# Patient Record
Sex: MALE | Race: WHITE | NOT HISPANIC OR LATINO | Employment: FULL TIME | URBAN - NONMETROPOLITAN AREA
[De-identification: names, ages, dates, MRNs, and addresses within clinical notes are randomized per-mention and may not be internally consistent; named-entity substitution may affect disease eponyms.]

---

## 2018-06-17 ENCOUNTER — HOSPITAL ENCOUNTER (EMERGENCY)
Facility: HOSPITAL | Age: 38
Discharge: HOME | End: 2018-06-18
Attending: EMERGENCY MEDICINE | Admitting: EMERGENCY MEDICINE

## 2018-06-17 ENCOUNTER — APPOINTMENT (OUTPATIENT)
Dept: GENERAL RADIOLOGY | Facility: HOSPITAL | Age: 38
End: 2018-06-17

## 2018-06-17 DIAGNOSIS — R07.9 CHEST PAIN, UNSPECIFIED TYPE: Primary | ICD-10-CM

## 2018-06-17 LAB
ALBUMIN SERPL-MCNC: 4.1 G/DL (ref 3.5–5)
ALBUMIN/GLOB SERPL: 1.2 G/DL (ref 1.1–2.5)
ALP SERPL-CCNC: 104 U/L (ref 24–120)
ALT SERPL W P-5'-P-CCNC: 92 U/L (ref 0–54)
ANION GAP SERPL CALCULATED.3IONS-SCNC: 11 MMOL/L (ref 4–13)
APTT PPP: 25.8 SECONDS (ref 24.1–34.8)
AST SERPL-CCNC: 51 U/L (ref 7–45)
BASOPHILS # BLD AUTO: 0.04 10*3/MM3 (ref 0–0.2)
BASOPHILS NFR BLD AUTO: 0.4 % (ref 0–2)
BILIRUB SERPL-MCNC: 0.3 MG/DL (ref 0.1–1)
BUN BLD-MCNC: 20 MG/DL (ref 5–21)
BUN/CREAT SERPL: 20.6 (ref 7–25)
CALCIUM SPEC-SCNC: 8.6 MG/DL (ref 8.4–10.4)
CHLORIDE SERPL-SCNC: 104 MMOL/L (ref 98–110)
CO2 SERPL-SCNC: 27 MMOL/L (ref 24–31)
CREAT BLD-MCNC: 0.97 MG/DL (ref 0.5–1.4)
D DIMER PPP FEU-MCNC: 0.33 MG/L (FEU) (ref 0–0.5)
DEPRECATED RDW RBC AUTO: 37.6 FL (ref 40–54)
EOSINOPHIL # BLD AUTO: 0.33 10*3/MM3 (ref 0–0.7)
EOSINOPHIL NFR BLD AUTO: 3.5 % (ref 0–4)
ERYTHROCYTE [DISTWIDTH] IN BLOOD BY AUTOMATED COUNT: 12.7 % (ref 12–15)
GFR SERPL CREATININE-BSD FRML MDRD: 87 ML/MIN/1.73
GLOBULIN UR ELPH-MCNC: 3.3 GM/DL
GLUCOSE BLD-MCNC: 109 MG/DL (ref 70–100)
HCT VFR BLD AUTO: 42.8 % (ref 40–52)
HGB BLD-MCNC: 14.7 G/DL (ref 14–18)
IMM GRANULOCYTES # BLD: 0.05 10*3/MM3 (ref 0–0.03)
IMM GRANULOCYTES NFR BLD: 0.5 % (ref 0–5)
INR PPP: 0.97 (ref 0.91–1.09)
LYMPHOCYTES # BLD AUTO: 3.45 10*3/MM3 (ref 0.72–4.86)
LYMPHOCYTES NFR BLD AUTO: 36.8 % (ref 15–45)
MCH RBC QN AUTO: 27.9 PG (ref 28–32)
MCHC RBC AUTO-ENTMCNC: 34.3 G/DL (ref 33–36)
MCV RBC AUTO: 81.4 FL (ref 82–95)
MONOCYTES # BLD AUTO: 0.78 10*3/MM3 (ref 0.19–1.3)
MONOCYTES NFR BLD AUTO: 8.3 % (ref 4–12)
NEUTROPHILS # BLD AUTO: 4.72 10*3/MM3 (ref 1.87–8.4)
NEUTROPHILS NFR BLD AUTO: 50.5 % (ref 39–78)
NRBC BLD MANUAL-RTO: 0 /100 WBC (ref 0–0)
NT-PROBNP SERPL-MCNC: <12 PG/ML (ref 0–450)
PLATELET # BLD AUTO: 237 10*3/MM3 (ref 130–400)
PMV BLD AUTO: 10.5 FL (ref 6–12)
POTASSIUM BLD-SCNC: 3.6 MMOL/L (ref 3.5–5.3)
PROT SERPL-MCNC: 7.4 G/DL (ref 6.3–8.7)
PROTHROMBIN TIME: 13.2 SECONDS (ref 11.9–14.6)
RBC # BLD AUTO: 5.26 10*6/MM3 (ref 4.8–5.9)
SODIUM BLD-SCNC: 142 MMOL/L (ref 135–145)
TROPONIN I SERPL-MCNC: <0.012 NG/ML (ref 0–0.03)
WBC NRBC COR # BLD: 9.37 10*3/MM3 (ref 4.8–10.8)

## 2018-06-17 PROCEDURE — 71045 X-RAY EXAM CHEST 1 VIEW: CPT

## 2018-06-17 PROCEDURE — 80053 COMPREHEN METABOLIC PANEL: CPT

## 2018-06-17 PROCEDURE — 85379 FIBRIN DEGRADATION QUANT: CPT | Performed by: NURSE PRACTITIONER

## 2018-06-17 PROCEDURE — 93005 ELECTROCARDIOGRAM TRACING: CPT

## 2018-06-17 PROCEDURE — 85730 THROMBOPLASTIN TIME PARTIAL: CPT | Performed by: NURSE PRACTITIONER

## 2018-06-17 PROCEDURE — 93005 ELECTROCARDIOGRAM TRACING: CPT | Performed by: EMERGENCY MEDICINE

## 2018-06-17 PROCEDURE — 99285 EMERGENCY DEPT VISIT HI MDM: CPT

## 2018-06-17 PROCEDURE — 85610 PROTHROMBIN TIME: CPT | Performed by: NURSE PRACTITIONER

## 2018-06-17 PROCEDURE — 85025 COMPLETE CBC W/AUTO DIFF WBC: CPT

## 2018-06-17 PROCEDURE — 93010 ELECTROCARDIOGRAM REPORT: CPT | Performed by: INTERNAL MEDICINE

## 2018-06-17 PROCEDURE — 83880 ASSAY OF NATRIURETIC PEPTIDE: CPT | Performed by: NURSE PRACTITIONER

## 2018-06-17 PROCEDURE — 84484 ASSAY OF TROPONIN QUANT: CPT

## 2018-06-17 RX ORDER — SODIUM CHLORIDE 0.9 % (FLUSH) 0.9 %
10 SYRINGE (ML) INJECTION AS NEEDED
Status: DISCONTINUED | OUTPATIENT
Start: 2018-06-17 | End: 2018-06-18 | Stop reason: HOSPADM

## 2018-06-17 RX ORDER — ASPIRIN 81 MG/1
324 TABLET, CHEWABLE ORAL ONCE
Status: DISCONTINUED | OUTPATIENT
Start: 2018-06-17 | End: 2018-06-17

## 2018-06-17 RX ORDER — ASPIRIN 81 MG/1
81 TABLET, CHEWABLE ORAL ONCE
Status: COMPLETED | OUTPATIENT
Start: 2018-06-17 | End: 2018-06-17

## 2018-06-17 RX ADMIN — ASPIRIN 81 MG 81 MG: 81 TABLET ORAL at 23:20

## 2018-06-18 ENCOUNTER — APPOINTMENT (OUTPATIENT)
Dept: CARDIOLOGY | Facility: HOSPITAL | Age: 38
End: 2018-06-18
Attending: EMERGENCY MEDICINE

## 2018-06-18 VITALS
HEART RATE: 65 BPM | RESPIRATION RATE: 15 BRPM | WEIGHT: 315 LBS | HEIGHT: 71 IN | BODY MASS INDEX: 44.1 KG/M2 | TEMPERATURE: 98.5 F | SYSTOLIC BLOOD PRESSURE: 152 MMHG | OXYGEN SATURATION: 100 % | DIASTOLIC BLOOD PRESSURE: 93 MMHG

## 2018-06-18 PROBLEM — R07.9 CHEST PAIN: Status: ACTIVE | Noted: 2018-06-18

## 2018-06-18 PROBLEM — Z00.00 WELLNESS EXAMINATION: Status: ACTIVE | Noted: 2018-06-18

## 2018-06-18 PROBLEM — K76.0 FATTY LIVER: Status: ACTIVE | Noted: 2018-06-18

## 2018-06-18 PROBLEM — G47.00 INSOMNIA: Status: ACTIVE | Noted: 2018-06-18

## 2018-06-18 PROBLEM — E66.9 OBESITY: Status: ACTIVE | Noted: 2018-06-18

## 2018-06-18 PROBLEM — G47.33 OBSTRUCTIVE SLEEP APNEA: Status: ACTIVE | Noted: 2018-06-18

## 2018-06-18 LAB
ALBUMIN SERPL-MCNC: 3.7 G/DL (ref 3.5–5)
ALBUMIN/GLOB SERPL: 1.1 G/DL (ref 1.1–2.5)
ALP SERPL-CCNC: 91 U/L (ref 24–120)
ALT SERPL W P-5'-P-CCNC: 93 U/L (ref 0–54)
ANION GAP SERPL CALCULATED.3IONS-SCNC: 10 MMOL/L (ref 4–13)
AST SERPL-CCNC: 56 U/L (ref 7–45)
BH CV STRESS BP STAGE 1: NORMAL
BH CV STRESS BP STAGE 2: NORMAL
BH CV STRESS BP STAGE 3: NORMAL
BH CV STRESS DURATION MIN STAGE 1: 3
BH CV STRESS DURATION MIN STAGE 2: 3
BH CV STRESS DURATION MIN STAGE 3: 0
BH CV STRESS DURATION SEC STAGE 1: 0
BH CV STRESS DURATION SEC STAGE 2: 0
BH CV STRESS DURATION SEC STAGE 3: 30
BH CV STRESS ECHO POST STRESS EJECTION FRACTION EF: 65 %
BH CV STRESS GRADE STAGE 1: 10
BH CV STRESS GRADE STAGE 2: 12
BH CV STRESS GRADE STAGE 3: 14
BH CV STRESS HR STAGE 1: 97
BH CV STRESS HR STAGE 2: 145
BH CV STRESS HR STAGE 3: 169
BH CV STRESS METS STAGE 1: 5
BH CV STRESS METS STAGE 2: 7.5
BH CV STRESS METS STAGE 3: 10
BH CV STRESS PROTOCOL 1: NORMAL
BH CV STRESS RECOVERY BP: NORMAL MMHG
BH CV STRESS RECOVERY HR: 100 BPM
BH CV STRESS SPEED STAGE 1: 1.7
BH CV STRESS SPEED STAGE 2: 2.5
BH CV STRESS SPEED STAGE 3: 3.4
BH CV STRESS STAGE 1: 1
BH CV STRESS STAGE 2: 2
BH CV STRESS STAGE 3: 3
BILIRUB SERPL-MCNC: 0.3 MG/DL (ref 0.1–1)
BUN BLD-MCNC: 19 MG/DL (ref 5–21)
BUN/CREAT SERPL: 21.6 (ref 7–25)
CALCIUM SPEC-SCNC: 8.7 MG/DL (ref 8.4–10.4)
CHLORIDE SERPL-SCNC: 105 MMOL/L (ref 98–110)
CO2 SERPL-SCNC: 27 MMOL/L (ref 24–31)
CREAT BLD-MCNC: 0.88 MG/DL (ref 0.5–1.4)
GFR SERPL CREATININE-BSD FRML MDRD: 97 ML/MIN/1.73
GLOBULIN UR ELPH-MCNC: 3.4 GM/DL
GLUCOSE BLD-MCNC: 106 MG/DL (ref 70–100)
HOLD SPECIMEN: NORMAL
HOLD SPECIMEN: NORMAL
LV EF 2D ECHO EST: 55 %
PERCENT MAX PREDICTED HR: 92.35 %
POTASSIUM BLD-SCNC: 4.3 MMOL/L (ref 3.5–5.3)
PROT SERPL-MCNC: 7.1 G/DL (ref 6.3–8.7)
SODIUM BLD-SCNC: 142 MMOL/L (ref 135–145)
STRESS BASELINE BP: NORMAL MMHG
STRESS BASELINE HR: 76 BPM
STRESS PERCENT HR: 109 %
STRESS POST ESTIMATED WORKLOAD: 10 METS
STRESS POST EXERCISE DUR MIN: 6 MIN
STRESS POST EXERCISE DUR SEC: 30 SEC
STRESS POST PEAK BP: NORMAL MMHG
STRESS POST PEAK HR: 169 BPM
TROPONIN I SERPL-MCNC: <0.012 NG/ML (ref 0–0.03)
TROPONIN I SERPL-MCNC: <0.012 NG/ML (ref 0–0.03)
WHOLE BLOOD HOLD SPECIMEN: NORMAL
WHOLE BLOOD HOLD SPECIMEN: NORMAL

## 2018-06-18 PROCEDURE — 93010 ELECTROCARDIOGRAM REPORT: CPT | Performed by: INTERNAL MEDICINE

## 2018-06-18 PROCEDURE — 93350 STRESS TTE ONLY: CPT

## 2018-06-18 PROCEDURE — 80053 COMPREHEN METABOLIC PANEL: CPT | Performed by: NURSE PRACTITIONER

## 2018-06-18 PROCEDURE — 93350 STRESS TTE ONLY: CPT | Performed by: INTERNAL MEDICINE

## 2018-06-18 PROCEDURE — 93352 ADMIN ECG CONTRAST AGENT: CPT | Performed by: INTERNAL MEDICINE

## 2018-06-18 PROCEDURE — 93017 CV STRESS TEST TRACING ONLY: CPT

## 2018-06-18 PROCEDURE — 84484 ASSAY OF TROPONIN QUANT: CPT | Performed by: EMERGENCY MEDICINE

## 2018-06-18 PROCEDURE — 25010000002 PERFLUTREN 6.52 MG/ML SUSPENSION: Performed by: INTERNAL MEDICINE

## 2018-06-18 PROCEDURE — 84484 ASSAY OF TROPONIN QUANT: CPT | Performed by: NURSE PRACTITIONER

## 2018-06-18 PROCEDURE — 93018 CV STRESS TEST I&R ONLY: CPT | Performed by: INTERNAL MEDICINE

## 2018-06-18 PROCEDURE — 93005 ELECTROCARDIOGRAM TRACING: CPT | Performed by: EMERGENCY MEDICINE

## 2018-06-18 RX ADMIN — PERFLUTREN 8.48 MG: 6.52 INJECTION, SUSPENSION INTRAVENOUS at 07:59

## 2018-06-18 NOTE — ED NOTES
Pt resting NSR on the monitor states decrease in pain.  Waiting for stress test in AM.  Has his own c-pap on.  Verbalizes no needs at present.     Elisa Storey RN  06/18/18 2972

## 2018-06-18 NOTE — ED PROVIDER NOTES
Subjective   Patient is a 38 yo male presents to the ER with complaints of intermittent chest pain. He states that he has had discomfort for past few days however today began experiencing worse pain. He reports left sided chest pain associated with mild shortness of breath and palpitations. He was concerned that he had a PE because he admittedly does little physical activity and is a  that requires long periods of time at rest. He denies any nausea, vomiting, dizziness, light headedness, diaphoresis, or other associated sxs or modifying factors. Due to sxs he elected to come to the ER for further evaluation and treatment.         Chest Pain   Pain location:  L chest  Pain quality: pressure    Pain severity:  Moderate  Timing:  Intermittent  Chronicity:  New  Context: at rest    Worsened by:  Nothing  Ineffective treatments:  Aspirin  Associated symptoms: palpitations and shortness of breath    Associated symptoms: no abdominal pain, no back pain, no cough, no diaphoresis, no dizziness, no fatigue, no fever, no headache, no heartburn, no nausea, no near-syncope, no numbness, no vomiting and no weakness    Risk factors: male sex and obesity    Risk factors: no prior DVT/PE and no smoking        Review of Systems   Constitutional: Negative for activity change, appetite change, chills, diaphoresis, fatigue and fever.   HENT: Negative for congestion, ear pain, nosebleeds, postnasal drip and sinus pressure.    Eyes: Negative for photophobia and pain.   Respiratory: Positive for shortness of breath. Negative for cough, chest tightness and wheezing.    Cardiovascular: Positive for chest pain and palpitations. Negative for near-syncope.   Gastrointestinal: Negative for abdominal pain, blood in stool, diarrhea, heartburn, nausea and vomiting.   Endocrine: Negative for cold intolerance and heat intolerance.   Genitourinary: Negative for difficulty urinating, dysuria and flank pain.   Musculoskeletal: Negative for  arthralgias, back pain, neck pain and neck stiffness.   Skin: Negative for color change and rash.   Neurological: Negative for dizziness, weakness, numbness and headaches.   Hematological: Negative for adenopathy. Does not bruise/bleed easily.   Psychiatric/Behavioral: Negative for confusion and sleep disturbance. The patient is not nervous/anxious.        Past Medical History:   Diagnosis Date   • Pulmonary hypertension     patient states he haad a tralee event while giving blood and went into pulmonary htn       No Known Allergies    Past Surgical History:   Procedure Laterality Date   • CHOLECYSTECTOMY     • SINUS SURGERY         History reviewed. No pertinent family history.    Social History     Social History   • Marital status: Single     Social History Main Topics   • Smoking status: Never Smoker   • Smokeless tobacco: Never Used   • Alcohol use No   • Drug use: No     Other Topics Concern   • Not on file           Objective   Physical Exam   Constitutional: He is oriented to person, place, and time. He appears well-developed and well-nourished. No distress.   HENT:   Head: Normocephalic and atraumatic.   Right Ear: External ear normal.   Left Ear: External ear normal.   Nose: Nose normal.   Eyes: Conjunctivae are normal. Pupils are equal, round, and reactive to light. No scleral icterus.   Neck: Normal range of motion. Neck supple. No JVD present. No thyromegaly present.   Cardiovascular: Normal rate, regular rhythm, normal heart sounds and intact distal pulses.    No murmur heard.  Pulmonary/Chest: Effort normal and breath sounds normal. No respiratory distress. He has no wheezes. He has no rales. He exhibits no tenderness.   Abdominal: Soft. Bowel sounds are normal. He exhibits no distension and no mass. There is no tenderness. There is no rebound and no guarding.   Musculoskeletal: Normal range of motion. He exhibits no edema.   Lymphadenopathy:     He has no cervical adenopathy.   Neurological: He is  alert and oriented to person, place, and time. He has normal reflexes. No cranial nerve deficit. Coordination normal.   Skin: Skin is warm and dry. Capillary refill takes less than 2 seconds. No rash noted. He is not diaphoretic. No erythema. No pallor.   Psychiatric: He has a normal mood and affect. His behavior is normal. Judgment and thought content normal.   Nursing note and vitals reviewed.      ECG 12 Lead    Date/Time: 6/17/2018 10:57 PM  Performed by: YOUSUF MONK  Authorized by: YOUSUF MONK   Interpreted by physician  Rhythm: sinus rhythm  Rate: normal  BPM: 80  QRS axis: normal      ECG 12 Lead    Date/Time: 6/18/2018 2:11 AM  Performed by: YOUSUF MONK  Authorized by: YOUSUF MONK   Interpreted by physician  Rhythm: sinus rhythm  Rate: normal  BPM: 65  QRS axis: normal               XR Chest 1 View   Final Result   1. No acute cardiopulmonary process.   This report was finalized on 06/18/2018 07:28 by Dr. Minerva Verduzco MD.        Labs Reviewed   COMPREHENSIVE METABOLIC PANEL - Abnormal; Notable for the following:        Result Value    Glucose 109 (*)     ALT (SGPT) 92 (*)     AST (SGOT) 51 (*)     All other components within normal limits   CBC WITH AUTO DIFFERENTIAL - Abnormal; Notable for the following:     MCV 81.4 (*)     MCH 27.9 (*)     RDW-SD 37.6 (*)     Immature Grans, Absolute 0.05 (*)     All other components within normal limits   COMPREHENSIVE METABOLIC PANEL - Abnormal; Notable for the following:     Glucose 106 (*)     ALT (SGPT) 93 (*)     AST (SGOT) 56 (*)     All other components within normal limits   TROPONIN (IN-HOUSE) - Normal   PROTIME-INR - Normal   APTT - Normal   D-DIMER, QUANTITATIVE - Normal    Narrative:     Reference Range is 0-0.50 mg/L FEU. However, results <0.50 mg/L FEU tends to rule out DVT or PE. Results >0.50 mg/L FEU are not useful in predicting absence or presence of DVT or PE.   TROPONIN (IN-HOUSE) - Normal   BNP (IN-HOUSE) -  Normal   TROPONIN (IN-HOUSE) - Normal   RAINBOW DRAW    Narrative:     The following orders were created for panel order [place] Draw.  Procedure                               Abnormality         Status                     ---------                               -----------         ------                     Light Blue Top[[id number]]                                   Final result               Green Top (Gel)[[id number]]                                  Final result               Lavender Top[[id number]]                                     Final result               Red Top[[id number]]                                          Final result                 Please view results for these tests on the individual orders.   TROPONIN (IN-HOUSE)   CBC WITH AUTO DIFFERENTIAL   CBC AND DIFFERENTIAL    Narrative:     The following orders were created for panel order CBC & Differential.  Procedure                               Abnormality         Status                     ---------                               -----------         ------                     CBC Auto Differential[[id number]]        Abnormal            Final result                 Please view results for these tests on the individual orders.   LIGHT BLUE TOP   GREEN TOP   LAVENDER TOP   RED TOP   CBC AND DIFFERENTIAL    Narrative:     The following orders were created for panel order CBC & Differential.  Procedure                               Abnormality         Status                     ---------                               -----------         ------                     CBC Auto Differential[[id number]]                                                         Please view results for these tests on the individual orders.         ED Course This will be a turn over for Dr. Marks. See his note for details.     Endorsed to me; patient is currently CP free. While his cardiac scores are low he is overweight. I did speak to him about his negative CE and EKG and need  for further evaluation with a stress. I offered this an outpatient but he cannot given his occupation and asks for one here. Given this will keep in ED for stress      ED Course as of Jun 18 1143   Mon Jun 18, 2018   1142 · Left ventricular systolic function is normal. Estimated EF = 55%.  · Normal stress echo with no significant echocardiographic evidence for myocardial ischemia.     [TS]   1142  The patient's symptoms are now better.  Patient is not having pain.  No chest pain, difficulty breathing, nausea, vomiting or palpitations.  No anxiety or dizziness.  Vital signs have been reviewed and appear to be correct.  Physical exam findings are improved.  Alert.  Oriented X3 .  No acute distress.  Breath sounds normal.  No respiratory distress, decreased breath sounds or wheezes.  Normal heart rate and rhythm.  Heart sounds normal.  .  Abdomen soft and nontender.  No abdominal tenderness or guarding or rebound tenderness.  Skin warm and dry.  No cyanosis or diaphoresis.  Oriented X 3.  Not anxious.  No alteration in mental status or weakness.          [TS]      ED Course User Index  [TS] Gulshan Poole MD                HEART Score (for prediction of 6-week risk of major adverse cardiac event) reviewed and/or performed as part of the patient evaluation and treatment planning process.  The result associated with this review/performance is: 0       MDM  Number of Diagnoses or Management Options     Amount and/or Complexity of Data Reviewed  Clinical lab tests: ordered and reviewed  Tests in the radiology section of CPT®: ordered and reviewed          Final diagnoses:   Chest pain, unspecified type            Gulshan Poole MD  06/18/18 1143

## 2018-06-22 ENCOUNTER — TELEPHONE (OUTPATIENT)
Dept: FAMILY MEDICINE CLINIC | Facility: CLINIC | Age: 38
End: 2018-06-22

## 2018-06-22 DIAGNOSIS — K85.90 ACUTE PANCREATITIS, UNSPECIFIED COMPLICATION STATUS, UNSPECIFIED PANCREATITIS TYPE: Primary | ICD-10-CM

## 2018-06-22 NOTE — TELEPHONE ENCOUNTER
Pt called and wants to know if he could have a enzyme check for his pancrease states he pancreatitis back in 02-03 and he thinks it may be inflamed again, pt went to [facility] ER 6/17/18 for chest pain and they told him the heart was ok but it may be his pancreas  last seen Dr Park 12/13/15

## 2018-06-25 ENCOUNTER — RESULTS ENCOUNTER (OUTPATIENT)
Dept: FAMILY MEDICINE CLINIC | Facility: CLINIC | Age: 38
End: 2018-06-25

## 2018-06-25 DIAGNOSIS — K85.90 ACUTE PANCREATITIS, UNSPECIFIED COMPLICATION STATUS, UNSPECIFIED PANCREATITIS TYPE: ICD-10-CM

## 2018-06-26 LAB
AMYLASE SERPL-CCNC: 79 U/L (ref 30–110)
LIPASE SERPL-CCNC: 171 U/L (ref 23–203)

## 2019-08-08 ENCOUNTER — HOSPITAL ENCOUNTER (OUTPATIENT)
Dept: GENERAL RADIOLOGY | Facility: HOSPITAL | Age: 39
Discharge: HOME | End: 2019-08-08

## 2019-08-08 ENCOUNTER — TRANSCRIBE ORDERS (OUTPATIENT)
Dept: ADMINISTRATIVE | Facility: HOSPITAL | Age: 39
End: 2019-08-08

## 2019-08-08 DIAGNOSIS — M25.571 RIGHT ANKLE PAIN, UNSPECIFIED CHRONICITY: Primary | ICD-10-CM

## 2019-08-08 PROCEDURE — 73610 X-RAY EXAM OF ANKLE: CPT

## 2020-01-17 ENCOUNTER — APPOINTMENT (OUTPATIENT)
Dept: GENERAL RADIOLOGY | Facility: HOSPITAL | Age: 40
End: 2020-01-17

## 2020-01-17 ENCOUNTER — HOSPITAL ENCOUNTER (EMERGENCY)
Facility: HOSPITAL | Age: 40
Discharge: HOME | End: 2020-01-17
Attending: EMERGENCY MEDICINE | Admitting: INTERNAL MEDICINE

## 2020-01-17 VITALS
OXYGEN SATURATION: 95 % | TEMPERATURE: 98.7 F | DIASTOLIC BLOOD PRESSURE: 86 MMHG | SYSTOLIC BLOOD PRESSURE: 138 MMHG | HEIGHT: 71 IN | HEART RATE: 64 BPM | RESPIRATION RATE: 19 BRPM | WEIGHT: 315 LBS | BODY MASS INDEX: 44.1 KG/M2

## 2020-01-17 DIAGNOSIS — R60.9 PERIPHERAL EDEMA: ICD-10-CM

## 2020-01-17 DIAGNOSIS — M94.0 COSTOCHONDRITIS, ACUTE: ICD-10-CM

## 2020-01-17 DIAGNOSIS — R07.89 ATYPICAL CHEST PAIN: Primary | ICD-10-CM

## 2020-01-17 LAB
ALBUMIN SERPL-MCNC: 4.1 G/DL (ref 3.5–5.2)
ALBUMIN/GLOB SERPL: 1.4 G/DL
ALP SERPL-CCNC: 125 U/L (ref 39–117)
ALT SERPL W P-5'-P-CCNC: 52 U/L (ref 1–41)
ANION GAP SERPL CALCULATED.3IONS-SCNC: 8 MMOL/L (ref 5–15)
AST SERPL-CCNC: 32 U/L (ref 1–40)
BASOPHILS # BLD AUTO: 0.05 10*3/MM3 (ref 0–0.2)
BASOPHILS NFR BLD AUTO: 0.5 % (ref 0–1.5)
BILIRUB SERPL-MCNC: 0.2 MG/DL (ref 0.2–1.2)
BUN BLD-MCNC: 21 MG/DL (ref 6–20)
BUN/CREAT SERPL: 26.9 (ref 7–25)
CALCIUM SPEC-SCNC: 8.8 MG/DL (ref 8.6–10.5)
CHLORIDE SERPL-SCNC: 104 MMOL/L (ref 98–107)
CO2 SERPL-SCNC: 29 MMOL/L (ref 22–29)
CREAT BLD-MCNC: 0.78 MG/DL (ref 0.76–1.27)
D DIMER PPP FEU-MCNC: 0.26 MG/L (FEU) (ref 0–0.5)
DEPRECATED RDW RBC AUTO: 38.2 FL (ref 37–54)
EOSINOPHIL # BLD AUTO: 0.27 10*3/MM3 (ref 0–0.4)
EOSINOPHIL NFR BLD AUTO: 2.7 % (ref 0.3–6.2)
ERYTHROCYTE [DISTWIDTH] IN BLOOD BY AUTOMATED COUNT: 12.7 % (ref 12.3–15.4)
GFR SERPL CREATININE-BSD FRML MDRD: 111 ML/MIN/1.73
GLOBULIN UR ELPH-MCNC: 3 GM/DL
GLUCOSE BLD-MCNC: 131 MG/DL (ref 65–99)
HCT VFR BLD AUTO: 41.5 % (ref 37.5–51)
HGB BLD-MCNC: 14.3 G/DL (ref 13–17.7)
HOLD SPECIMEN: NORMAL
HOLD SPECIMEN: NORMAL
IMM GRANULOCYTES # BLD AUTO: 0.03 10*3/MM3 (ref 0–0.05)
IMM GRANULOCYTES NFR BLD AUTO: 0.3 % (ref 0–0.5)
LYMPHOCYTES # BLD AUTO: 2.75 10*3/MM3 (ref 0.7–3.1)
LYMPHOCYTES NFR BLD AUTO: 27.6 % (ref 19.6–45.3)
MCH RBC QN AUTO: 28.5 PG (ref 26.6–33)
MCHC RBC AUTO-ENTMCNC: 34.5 G/DL (ref 31.5–35.7)
MCV RBC AUTO: 82.7 FL (ref 79–97)
MONOCYTES # BLD AUTO: 0.91 10*3/MM3 (ref 0.1–0.9)
MONOCYTES NFR BLD AUTO: 9.1 % (ref 5–12)
NEUTROPHILS # BLD AUTO: 5.95 10*3/MM3 (ref 1.7–7)
NEUTROPHILS NFR BLD AUTO: 59.8 % (ref 42.7–76)
NRBC BLD AUTO-RTO: 0 /100 WBC (ref 0–0.2)
PLATELET # BLD AUTO: 247 10*3/MM3 (ref 140–450)
PMV BLD AUTO: 10.5 FL (ref 6–12)
POTASSIUM BLD-SCNC: 4.1 MMOL/L (ref 3.5–5.2)
PROT SERPL-MCNC: 7.1 G/DL (ref 6–8.5)
RBC # BLD AUTO: 5.02 10*6/MM3 (ref 4.14–5.8)
SODIUM BLD-SCNC: 141 MMOL/L (ref 136–145)
TROPONIN T SERPL-MCNC: <0.01 NG/ML (ref 0–0.03)
TROPONIN T SERPL-MCNC: <0.01 NG/ML (ref 0–0.03)
WBC NRBC COR # BLD: 9.96 10*3/MM3 (ref 3.4–10.8)
WHOLE BLOOD HOLD SPECIMEN: NORMAL
WHOLE BLOOD HOLD SPECIMEN: NORMAL

## 2020-01-17 PROCEDURE — 80053 COMPREHEN METABOLIC PANEL: CPT | Performed by: EMERGENCY MEDICINE

## 2020-01-17 PROCEDURE — 93005 ELECTROCARDIOGRAM TRACING: CPT | Performed by: EMERGENCY MEDICINE

## 2020-01-17 PROCEDURE — 71045 X-RAY EXAM CHEST 1 VIEW: CPT

## 2020-01-17 PROCEDURE — 85025 COMPLETE CBC W/AUTO DIFF WBC: CPT | Performed by: EMERGENCY MEDICINE

## 2020-01-17 PROCEDURE — 85379 FIBRIN DEGRADATION QUANT: CPT | Performed by: EMERGENCY MEDICINE

## 2020-01-17 PROCEDURE — 84484 ASSAY OF TROPONIN QUANT: CPT | Performed by: EMERGENCY MEDICINE

## 2020-01-17 PROCEDURE — 99284 EMERGENCY DEPT VISIT MOD MDM: CPT

## 2020-01-17 PROCEDURE — 93010 ELECTROCARDIOGRAM REPORT: CPT | Performed by: INTERNAL MEDICINE

## 2020-01-17 PROCEDURE — 36415 COLL VENOUS BLD VENIPUNCTURE: CPT

## 2020-01-17 RX ORDER — ASCORBIC ACID 125 MG
100 TABLET,CHEWABLE ORAL
COMMUNITY
End: 2020-06-01

## 2020-01-17 RX ORDER — FUROSEMIDE 20 MG/1
20 TABLET ORAL DAILY
Qty: 10 TABLET | Refills: 0 | Status: SHIPPED | OUTPATIENT
Start: 2020-01-17 | End: 2020-06-01 | Stop reason: SDUPTHER

## 2020-01-17 RX ORDER — ASPIRIN 81 MG/1
324 TABLET, CHEWABLE ORAL ONCE
Status: COMPLETED | OUTPATIENT
Start: 2020-01-17 | End: 2020-01-17

## 2020-01-17 RX ORDER — SODIUM CHLORIDE 0.9 % (FLUSH) 0.9 %
10 SYRINGE (ML) INJECTION AS NEEDED
Status: DISCONTINUED | OUTPATIENT
Start: 2020-01-17 | End: 2020-01-17 | Stop reason: HOSPADM

## 2020-01-17 RX ADMIN — ASPIRIN 81 MG 324 MG: 81 TABLET ORAL at 17:18

## 2020-01-17 NOTE — ED PROVIDER NOTES
Subjective   Patient is a 39-year-old male who presents to the ER with chest pain.  Patient states he has been having intermittent chest pain since around 2:30 PM today.  Patient states the pain started while driving in his car.  Chest pain is located over the left anterior chest and is a sharp shooting pain.  Patient states that when he has the pain it lasts for just a few seconds and then resolves on its own.  He currently does not have any pain.  There is no radiation to the pain.  Patient says that when he has the pain he has associated shortness of air, lightheadedness and dry mouth.  He denies any fever, abdominal pain, nausea vomiting diarrhea, urinary changes, neurologic changes, leg pain or swelling, cough.  Patient has no history of cardiac problems.          Review of Systems   Constitutional: Negative.    HENT: Negative.    Eyes: Negative.    Respiratory: Positive for shortness of breath.    Cardiovascular: Positive for chest pain.   Gastrointestinal: Negative.    Endocrine: Negative.    Genitourinary: Negative.    Musculoskeletal: Negative.    Skin: Negative.    Allergic/Immunologic: Negative.    Neurological: Positive for light-headedness.   Hematological: Negative.    Psychiatric/Behavioral: Negative.    All other systems reviewed and are negative.      Past Medical History:   Diagnosis Date   • COPD (chronic obstructive pulmonary disease) (CMS/HCC)    • Encephalitis    • Pancreatitis    • Pulmonary hypertension (CMS/HCC)     patient states he haad a tralee event while giving blood and went into pulmonary htn       No Known Allergies    Past Surgical History:   Procedure Laterality Date   • CHOLECYSTECTOMY     • SINUS SURGERY         History reviewed. No pertinent family history.    Social History     Socioeconomic History   • Marital status:      Spouse name: Not on file   • Number of children: Not on file   • Years of education: Not on file   • Highest education level: Not on file   Tobacco  Use   • Smoking status: Never Smoker   • Smokeless tobacco: Never Used   Substance and Sexual Activity   • Alcohol use: No   • Drug use: No           Objective   Physical Exam   Constitutional: He is oriented to person, place, and time. He appears well-developed and well-nourished.   HENT:   Head: Normocephalic and atraumatic.   Eyes: Pupils are equal, round, and reactive to light. Conjunctivae are normal.   Neck: Normal range of motion.   Cardiovascular: Normal rate, regular rhythm and normal heart sounds.   Pulmonary/Chest: Effort normal and breath sounds normal.   Abdominal: Soft. There is no tenderness.   Musculoskeletal: Normal range of motion. He exhibits no edema or deformity.   Neurological: He is alert and oriented to person, place, and time. He has normal strength.   Skin: Skin is warm.   Psychiatric: He has a normal mood and affect. His behavior is normal.   Nursing note and vitals reviewed.      Procedures           ED Course  ED Course as of Jan 17 2054 Fri Jan 17, 2020 2052 After the patient's labs returned I went back in and reviewed the labs with the patient and rediscussed his history of present illness it sounds as though this patient's discomfort and symptomatology has been varied and diverse through the years and has come and gone.  I explained to him that he is certainly not having a myocardial event tonight however given his risk factors problems and his multitude of symptoms would definitely recommend further work-up by his primary care provider.    [RW]      ED Course User Index  [RW] Mariusz Rosas MD      ECG: normal sinus rhythm with a rate of 68, no acute ischemia or infarction    Patient was asymptomatic while here in the ER.  Patient was given aspirin.  NO chest pain while here in the ER.      Lab Results (last 24 hours)     Procedure Component Value Units Date/Time    CBC & Differential [[id number]] Collected:  01/17/20 1658    Specimen:  Blood Updated:  01/17/20 1708     Narrative:       The following orders were created for panel order CBC & Differential.  Procedure                               Abnormality         Status                     ---------                               -----------         ------                     CBC Auto Differential[[id number]]        Abnormal            Final result                 Please view results for these tests on the individual orders.    Comprehensive Metabolic Panel [[id number]]  (Abnormal) Collected:  01/17/20 1658    Specimen:  Blood Updated:  01/17/20 1725     Glucose 131 mg/dL      BUN 21 mg/dL      Creatinine 0.78 mg/dL      Sodium 141 mmol/L      Potassium 4.1 mmol/L      Chloride 104 mmol/L      CO2 29.0 mmol/L      Calcium 8.8 mg/dL      Total Protein 7.1 g/dL      Albumin 4.10 g/dL      ALT (SGPT) 52 U/L      AST (SGOT) 32 U/L      Alkaline Phosphatase 125 U/L      Total Bilirubin 0.2 mg/dL      eGFR Non African Amer 111 mL/min/1.73      Globulin 3.0 gm/dL      A/G Ratio 1.4 g/dL      BUN/Creatinine Ratio 26.9     Anion Gap 8.0 mmol/L     Narrative:       GFR Normal >60  Chronic Kidney Disease <60  Kidney Failure <15      Troponin [[id number]]  (Normal) Collected:  01/17/20 1658    Specimen:  Blood Updated:  01/17/20 1724     Troponin T <0.010 ng/mL     Narrative:       Troponin T Reference Range:  <= 0.03 ng/mL-   Negative for AMI  >0.03 ng/mL-     Abnormal for myocardial necrosis.  Clinicians would have to utilize clinical acumen, EKG, Troponin and serial changes to determine if it is an Acute Myocardial Infarction or myocardial injury due to an underlying chronic condition.       Results may be falsely decreased if patient taking Biotin.      D-dimer, Quantitative [[id number]]  (Normal) Collected:  01/17/20 1658    Specimen:  Blood Updated:  01/17/20 1718     D-Dimer, Quantitative 0.26 mg/L (FEU)     Narrative:       Reference Range is 0-0.50 mg/L FEU. However, results <0.50 mg/L FEU tends to rule out DVT or PE. Results >0.50 mg/L  FEU are not useful in predicting absence or presence of DVT or PE.      CBC Auto Differential [[id number]]  (Abnormal) Collected:  01/17/20 1658    Specimen:  Blood Updated:  01/17/20 1708     WBC 9.96 10*3/mm3      RBC 5.02 10*6/mm3      Hemoglobin 14.3 g/dL      Hematocrit 41.5 %      MCV 82.7 fL      MCH 28.5 pg      MCHC 34.5 g/dL      RDW 12.7 %      RDW-SD 38.2 fl      MPV 10.5 fL      Platelets 247 10*3/mm3      Neutrophil % 59.8 %      Lymphocyte % 27.6 %      Monocyte % 9.1 %      Eosinophil % 2.7 %      Basophil % 0.5 %      Immature Grans % 0.3 %      Neutrophils, Absolute 5.95 10*3/mm3      Lymphocytes, Absolute 2.75 10*3/mm3      Monocytes, Absolute 0.91 10*3/mm3      Eosinophils, Absolute 0.27 10*3/mm3      Basophils, Absolute 0.05 10*3/mm3      Immature Grans, Absolute 0.03 10*3/mm3      nRBC 0.0 /100 WBC     Troponin [[id number]]  (Normal) Collected:  01/17/20 2008    Specimen:  Blood Updated:  01/17/20 2032     Troponin T <0.010 ng/mL     Narrative:       Troponin T Reference Range:  <= 0.03 ng/mL-   Negative for AMI  >0.03 ng/mL-     Abnormal for myocardial necrosis.  Clinicians would have to utilize clinical acumen, EKG, Troponin and serial changes to determine if it is an Acute Myocardial Infarction or myocardial injury due to an underlying chronic condition.       Results may be falsely decreased if patient taking Biotin.          Labs are unremarkable including troponin and d-dimer.  Still awaiting chest x-ray results and second troponin results at shift change.  Care transferred to Dr. Rosas.    HEART: 1                                         MDM    Final diagnoses:   Atypical chest pain   Costochondritis, acute            Mariusz Rosas MD  01/17/20 2054

## 2020-01-18 NOTE — DISCHARGE INSTRUCTIONS
Costochondritis    Costochondritis is swelling and irritation (inflammation) of the tissue (cartilage) that connects your ribs to your breastbone (sternum). This causes pain in the front of your chest. The pain usually starts gradually and involves more than one rib.  What are the causes?  The exact cause of this condition is not always known. It results from stress on the cartilage where your ribs attach to your sternum. The cause of this stress could be:  · Chest injury (trauma).  · Exercise or activity, such as lifting.  · Severe coughing.  What increases the risk?  You may be at higher risk for this condition if you:  · Are female.  · Are 30?40 years old.  · Recently started a new exercise or work activity.  · Have low levels of vitamin D.  · Have a condition that makes you cough frequently.  What are the signs or symptoms?  The main symptom of this condition is chest pain. The pain:  · Usually starts gradually and can be sharp or dull.  · Gets worse with deep breathing, coughing, or exercise.  · Gets better with rest.  · May be worse when you press on the sternum-rib connection (tenderness).  How is this diagnosed?  This condition is diagnosed based on your symptoms, medical history, and a physical exam. Your health care provider will check for tenderness when pressing on your sternum. This is the most important finding. You may also have tests to rule out other causes of chest pain. These may include:  · A chest X-ray to check for lung problems.  · An electrocardiogram (ECG) to see if you have a heart problem that could be causing the pain.  · An imaging scan to rule out a chest or rib fracture.  How is this treated?  This condition usually goes away on its own over time. Your health care provider may prescribe an NSAID to reduce pain and inflammation. Your health care provider may also suggest that you:  · Rest and avoid activities that make pain worse.  · Apply heat or cold to the area to reduce pain and  inflammation.  · Do exercises to stretch your chest muscles.  If these treatments do not help, your health care provider may inject a numbing medicine at the sternum-rib connection to help relieve the pain.  Follow these instructions at home:  · Avoid activities that make pain worse. This includes any activities that use chest, abdominal, and side muscles.  · If directed, put ice on the painful area:  ? Put ice in a plastic bag.  ? Place a towel between your skin and the bag.  ? Leave the ice on for 20 minutes, 2-3 times a day.  · If directed, apply heat to the affected area as often as told by your health care provider. Use the heat source that your health care provider recommends, such as a moist heat pack or a heating pad.  ? Place a towel between your skin and the heat source.  ? Leave the heat on for 20-30 minutes.  ? Remove the heat if your skin turns bright red. This is especially important if you are unable to feel pain, heat, or cold. You may have a greater risk of getting burned.  · Take over-the-counter and prescription medicines only as told by your health care provider.  · Return to your normal activities as told by your health care provider. Ask your health care provider what activities are safe for you.  · Keep all follow-up visits as told by your health care provider. This is important.  Contact a health care provider if:  · You have chills or a fever.  · Your pain does not go away or it gets worse.  · You have a cough that does not go away (is persistent).  Get help right away if:  · You have shortness of breath.  This information is not intended to replace advice given to you by your health care provider. Make sure you discuss any questions you have with your health care provider.  Document Released: 09/27/2006 Document Revised: 09/19/2018 Document Reviewed: 04/12/2017  [employer] Patient Education © 2019 [employer]

## 2020-05-12 ENCOUNTER — TELEPHONE (OUTPATIENT)
Dept: FAMILY MEDICINE CLINIC | Facility: CLINIC | Age: 40
End: 2020-05-12

## 2020-05-12 NOTE — TELEPHONE ENCOUNTER
PT CALLED REQUESTING A PRESCRIPTION FOR WATER PILLS TO HELP WITH HIS LEGS.    PHARMACY: [facility] - [place], [place] - [address] - [phone]  - [phone]   [phone]    PT'S CALLBACK NUMBER: [phone]

## 2020-05-20 ENCOUNTER — OFFICE VISIT (OUTPATIENT)
Dept: FAMILY MEDICINE CLINIC | Facility: CLINIC | Age: 40
End: 2020-05-20

## 2020-05-20 VITALS
BODY MASS INDEX: 44.1 KG/M2 | TEMPERATURE: 97.9 F | HEART RATE: 66 BPM | DIASTOLIC BLOOD PRESSURE: 94 MMHG | SYSTOLIC BLOOD PRESSURE: 157 MMHG | WEIGHT: 315 LBS | HEIGHT: 71 IN

## 2020-05-20 DIAGNOSIS — R60.9 EDEMA, UNSPECIFIED TYPE: ICD-10-CM

## 2020-05-20 DIAGNOSIS — K76.0 FATTY LIVER: ICD-10-CM

## 2020-05-20 DIAGNOSIS — R74.8 LIVER ENZYME ELEVATION: ICD-10-CM

## 2020-05-20 DIAGNOSIS — E66.9 OBESITY, UNSPECIFIED CLASSIFICATION, UNSPECIFIED OBESITY TYPE, UNSPECIFIED WHETHER SERIOUS COMORBIDITY PRESENT: Primary | ICD-10-CM

## 2020-05-20 DIAGNOSIS — R03.0 ELEVATED BLOOD PRESSURE READING: ICD-10-CM

## 2020-05-20 DIAGNOSIS — G47.33 OBSTRUCTIVE SLEEP APNEA: ICD-10-CM

## 2020-05-20 PROBLEM — Z01.89 LABORATORY TEST: Status: ACTIVE | Noted: 2020-05-20

## 2020-05-20 PROBLEM — G25.81 RESTLESS LEG SYNDROME: Status: ACTIVE | Noted: 2020-05-20

## 2020-05-20 PROBLEM — I27.20 PULMONARY HYPERTENSION (HCC): Status: ACTIVE | Noted: 2020-05-20

## 2020-05-20 PROBLEM — R25.8 NOCTURNAL LEG MOVEMENTS: Status: ACTIVE | Noted: 2020-05-20

## 2020-05-20 PROCEDURE — 99213 OFFICE O/P EST LOW 20 MIN: CPT | Performed by: FAMILY MEDICINE

## 2020-05-20 RX ORDER — ASPIRIN 81 MG/1
162 TABLET ORAL DAILY PRN
COMMUNITY
End: 2020-07-09

## 2020-05-20 RX ORDER — FUROSEMIDE 20 MG/1
20 TABLET ORAL DAILY
Qty: 30 TABLET | Refills: 5 | Status: SHIPPED | OUTPATIENT
Start: 2020-05-20 | End: 2020-10-13 | Stop reason: SDUPTHER

## 2020-05-21 NOTE — PROGRESS NOTES
"Subjective   Hernandez Pizarro is a 39 y.o. male presenting with chief complaint of:   Chief Complaint   Patient presents with   • Leg Swelling     \"wanted to get a fluid pill, i can press it leaves a dent\"   • Shortness of Breath     \"when my blood pressure comes up, im seem to get more short of breath\"   You have chosen to receive care through a telephone visit. Do you consent to use a telephone visit for your medical care today? Yes    This visit has been rescheduled as a phone visit to comply with patient safety concerns in accordance with [facility] recommendations. Total time of discussion was 15 minutes.    History of Present Illness :  Alone.  Here for primarily an acute issue today; called about leg edema:    5.12.20  PT CALLED REQUESTING A PRESCRIPTION FOR WATER PILLS TO HELP WITH HIS LEGS.  PHARMACY: [facility] [address] [facility] - [phone]  - [phone]   [phone]   PT'S CALLBACK NUMBER: [phone]       Had gone to /ER 1.17.20 with chest pain.  Was felt to be nonacute and sent home.  (see labs below)    Has few chronic problems to consider that might have a bearing on today's issues; not an interval appointment.       Chronic/acute problems reviewed today:   1. Obesity, unspecified classification, unspecified obesity type, unspecified whether serious comorbidity present. Chronic/stable.  Aware, advised weight loss before.  On/off some success with weight loss but often with weight gain back.        2. Edema, unspecified type: chronic/stable last ? 6m (TCC shows as prior dx).  No better/worse with day/night.  No pain.  Causes include: unknown.      3. Fatty liver: Chronic/stable.  Aware treatment involves normalizing weight; usually including low fat diet and regular exercise.  Advised past condition can go on to cirrhosis and death.  Stable occ liver labs and past imaging.        4. Obstructive sleep apnea: Chronic/stable.  Uses cpap compliantly without significant problems " "with equipment.  Finds sleeps better and less sleepy during the day.       5. Elevated blood pressure reading: acute/recently finds his /80 ranges: recent/observation     6. Liver enzyme elevation: chronic/past.       Has an/another acute issue today: none.    The following portions of the patient's history were reviewed and updated as appropriate: allergies, current medications, past family history, past medical history, past social history, past surgical history and problem list.      Current Outpatient Medications:   •  aspirin 81 MG EC tablet, Take 162 mg by mouth Daily As Needed (\"when my blood pressure is coming up\")., Disp: , Rfl:   •  coenzyme Q10 100 MG capsule, Take 100 mg by mouth Daily., Disp: , Rfl:   •  furosemide (LASIX) 20 MG tablet, Take 1 tablet by mouth Daily., Disp: 10 tablet, Rfl: 0: out  •  Menaquinone-7 (VITAMIN K2) 100 MCG capsule, Take 100 mcg by mouth., Disp: , Rfl:   •  milk thistle 175 MG tablet, Take 175 mg by mouth Daily., Disp: , Rfl:     No problems with medications.  Refills if needed done    No Known Allergies    Review of Systems    GENERAL:  Active home/work. Sleep is ok with cpap. No fever now.  SKIN: No rash/skin lesion of concern.   ENDO:  No syncope, near or diaphoretic sweaty spells.    HEENT: No recent head injury; or headache.    CHEST: No chest wall tenderness or mass. No significant cough,  without wheeze.  No SOB; no hemoptysis.  CV: No exertional chest pain, palpitations; above/new ankle edema.  Relates history of PH/echo  GI: No dysphagia or heartburn.  No abdominal pain, diarrhea, constipation.  No rectal bleeding, or melena.    :  Voids without dysuria, or  incontinence to completion.  ORTHO: No painful/swollen joints but various on /off sore.  No sore neck or back.  No acute neck or back pain without recent injury.  NEURO: No focal/significant weakness of extremities. No dizziness.   No numbness/paresthesias.   PSYCH: No memory loss.  Mood good; not that " anxious, depressed but/and not suicidal.  Tries to tolerate stress .   Screening:  Mammogram: NA  Bone density: NA  Low dose CT chest: Tobacco-smoker/none  GI: NA  Prostate: NA  Usual lab order  none    Lab Results:  Results for orders placed or performed during the hospital encounter of 01/17/20   Comprehensive Metabolic Panel   Result Value Ref Range    Glucose 131 (H) 65 - 99 mg/dL    BUN 21 (H) 6 - 20 mg/dL    Creatinine 0.78 0.76 - 1.27 mg/dL    Sodium 141 136 - 145 mmol/L    Potassium 4.1 3.5 - 5.2 mmol/L    Chloride 104 98 - 107 mmol/L    CO2 29.0 22.0 - 29.0 mmol/L    Calcium 8.8 8.6 - 10.5 mg/dL    Total Protein 7.1 6.0 - 8.5 g/dL    Albumin 4.10 3.50 - 5.20 g/dL    ALT (SGPT) 52 (H) 1 - 41 U/L    AST (SGOT) 32 1 - 40 U/L    Alkaline Phosphatase 125 (H) 39 - 117 U/L    Total Bilirubin 0.2 0.2 - 1.2 mg/dL    eGFR Non African Amer 111 >60 mL/min/1.73    Globulin 3.0 gm/dL    A/G Ratio 1.4 g/dL    BUN/Creatinine Ratio 26.9 (H) 7.0 - 25.0    Anion Gap 8.0 5.0 - 15.0 mmol/L   Troponin   Result Value Ref Range    Troponin T <0.010 0.000 - 0.030 ng/mL   Troponin   Result Value Ref Range    Troponin T <0.010 0.000 - 0.030 ng/mL   D-dimer, Quantitative   Result Value Ref Range    D-Dimer, Quantitative 0.26 0.00 - 0.50 mg/L (FEU)   CBC Auto Differential   Result Value Ref Range    WBC 9.96 3.40 - 10.80 10*3/mm3    RBC 5.02 4.14 - 5.80 10*6/mm3    Hemoglobin 14.3 13.0 - 17.7 g/dL    Hematocrit 41.5 37.5 - 51.0 %    MCV 82.7 79.0 - 97.0 fL    MCH 28.5 26.6 - 33.0 pg    MCHC 34.5 31.5 - 35.7 g/dL    RDW 12.7 12.3 - 15.4 %    RDW-SD 38.2 37.0 - 54.0 fl    MPV 10.5 6.0 - 12.0 fL    Platelets 247 140 - 450 10*3/mm3    Neutrophil % 59.8 42.7 - 76.0 %    Lymphocyte % 27.6 19.6 - 45.3 %    Monocyte % 9.1 5.0 - 12.0 %    Eosinophil % 2.7 0.3 - 6.2 %    Basophil % 0.5 0.0 - 1.5 %    Immature Grans % 0.3 0.0 - 0.5 %    Neutrophils, Absolute 5.95 1.70 - 7.00 10*3/mm3    Lymphocytes, Absolute 2.75 0.70 - 3.10 10*3/mm3     "Monocytes, Absolute 0.91 (H) 0.10 - 0.90 10*3/mm3    Eosinophils, Absolute 0.27 0.00 - 0.40 10*3/mm3    Basophils, Absolute 0.05 0.00 - 0.20 10*3/mm3    Immature Grans, Absolute 0.03 0.00 - 0.05 10*3/mm3    nRBC 0.0 0.0 - 0.2 /100 WBC   Light Blue Top   Result Value Ref Range    Extra Tube hold for add-on    Green Top (Gel)   Result Value Ref Range    Extra Tube Hold for add-ons.    Lavender Top   Result Value Ref Range    Extra Tube hold for add-on    Red Top   Result Value Ref Range    Extra Tube Hold for add-ons.        A1C:No results for input(s): HGBA1C in the last 16532 hours.  PSA:No results for input(s): PSA in the last 28806 hours.  CBC:  Lab Results - Last 18 Months   Lab Units 01/17/20  1658   WBC 10*3/mm3 9.96   HEMOGLOBIN g/dL 14.3   HEMATOCRIT % 41.5   PLATELETS 10*3/mm3 247      BMP/CMP:  Lab Results - Last 18 Months   Lab Units 01/17/20  1658   SODIUM mmol/L 141   POTASSIUM mmol/L 4.1   CHLORIDE mmol/L 104   CO2 mmol/L 29.0   BUN mg/dL 21*   CREATININE mg/dL 0.78   EGFR IF NONAFRICN AM mL/min/1.73 111   CALCIUM mg/dL 8.8     HEPATIC:  Lab Results - Last 18 Months   Lab Units 01/17/20  1658   ALT (SGPT) U/L 52*   AST (SGOT) U/L 32   ALK PHOS U/L 125*     THYROID:No results for input(s): TSH, T3FREE, FTI in the last 78490 hours.    Invalid input(s): T4FREE, T3, T4, TEUP,  TOTALT4    Objective   /94 Comment: 122/69 last noc on wrist cuff  Pulse 66   Temp 97.9 °F (36.6 °C)   Ht 180.3 cm (71\")   Wt (!) 150 kg (331 lb)   BMI 46.17 kg/m²   Body mass index is 46.17 kg/m².    Physical Exam  None; due to telephone/COVID19  Was alert, oriented x 3, pleasant.     Assessment/Plan     1. Obesity, unspecified classification, unspecified obesity type, unspecified whether serious comorbidity present    2. Edema, unspecified type    3. Fatty liver    4. Obstructive sleep apnea    5. Elevated blood pressure reading    6. Liver enzyme elevation      Rx: reviewed/changes:  New Medications Ordered This Visit "   Medications   • furosemide (Lasix) 20 MG tablet     Sig: Take 1 tablet by mouth Daily.     Dispense:  30 tablet     Refill:  5     LAB/Testing/Referrals: reviewed/orders:   Today:   Orders Placed This Encounter   Procedures   • US Venous Doppler Lower Extremity Bilateral (duplex)   • US Elastography Parenchyma   • US Liver   • Basic Metabolic Panel   • TSH   • T4, Free   • Adult Transthoracic Echo Complete W/ Cont if Necessary Per Protocol     Chronic/recurrent labs above or change to:   To decide    Discussions:   emperic lasix  Get studies  More thoughts, opinions, etc after testing and result of lasix  Body mass index is 46.17 kg/m².  Patient's Body mass index is 46.17 kg/m². BMI is above normal parameters. Recommendations include: exercise counseling, nutrition counseling and as before.    Tobacco use reviewed:    Non-smoker  Hernandez Pizarro  reports that he has never smoked. He has never used smokeless tobacco..    There are no Patient Instructions on file for this visit.    Follow up: Return for Dr Park-.  Future Appointments   Date Time Provider Department Center   6/18/2020  3:45 PM Frandy Park MD [facility] None

## 2020-05-25 ENCOUNTER — RESULTS ENCOUNTER (OUTPATIENT)
Dept: FAMILY MEDICINE CLINIC | Facility: CLINIC | Age: 40
End: 2020-05-25

## 2020-05-25 DIAGNOSIS — R03.0 ELEVATED BLOOD PRESSURE READING: ICD-10-CM

## 2020-05-25 DIAGNOSIS — K76.0 FATTY LIVER: ICD-10-CM

## 2020-05-25 DIAGNOSIS — R60.9 EDEMA, UNSPECIFIED TYPE: ICD-10-CM

## 2020-05-25 DIAGNOSIS — E66.9 OBESITY, UNSPECIFIED CLASSIFICATION, UNSPECIFIED OBESITY TYPE, UNSPECIFIED WHETHER SERIOUS COMORBIDITY PRESENT: ICD-10-CM

## 2020-05-29 ENCOUNTER — TELEPHONE (OUTPATIENT)
Dept: FAMILY MEDICINE CLINIC | Facility: CLINIC | Age: 40
End: 2020-05-29

## 2020-06-01 ENCOUNTER — HOSPITAL ENCOUNTER (EMERGENCY)
Facility: HOSPITAL | Age: 40
Discharge: HOME | End: 2020-06-02
Attending: EMERGENCY MEDICINE | Admitting: EMERGENCY MEDICINE

## 2020-06-01 ENCOUNTER — TELEPHONE (OUTPATIENT)
Dept: FAMILY MEDICINE CLINIC | Facility: CLINIC | Age: 40
End: 2020-06-01

## 2020-06-01 ENCOUNTER — APPOINTMENT (OUTPATIENT)
Dept: GENERAL RADIOLOGY | Facility: HOSPITAL | Age: 40
End: 2020-06-01

## 2020-06-01 ENCOUNTER — OFFICE VISIT (OUTPATIENT)
Dept: FAMILY MEDICINE CLINIC | Facility: CLINIC | Age: 40
End: 2020-06-01

## 2020-06-01 VITALS
HEIGHT: 71 IN | RESPIRATION RATE: 16 BRPM | WEIGHT: 315 LBS | DIASTOLIC BLOOD PRESSURE: 110 MMHG | TEMPERATURE: 98.3 F | SYSTOLIC BLOOD PRESSURE: 142 MMHG | OXYGEN SATURATION: 98 % | HEART RATE: 91 BPM | BODY MASS INDEX: 44.1 KG/M2

## 2020-06-01 DIAGNOSIS — R03.0 ELEVATED BLOOD PRESSURE READING: ICD-10-CM

## 2020-06-01 DIAGNOSIS — I27.20 PULMONARY HYPERTENSION (HCC): ICD-10-CM

## 2020-06-01 DIAGNOSIS — G47.33 OBSTRUCTIVE SLEEP APNEA: ICD-10-CM

## 2020-06-01 DIAGNOSIS — E66.9 OBESITY, UNSPECIFIED CLASSIFICATION, UNSPECIFIED OBESITY TYPE, UNSPECIFIED WHETHER SERIOUS COMORBIDITY PRESENT: ICD-10-CM

## 2020-06-01 DIAGNOSIS — R07.9 CHEST PAIN, UNSPECIFIED TYPE: ICD-10-CM

## 2020-06-01 DIAGNOSIS — R06.00 DYSPNEA, UNSPECIFIED TYPE: Primary | ICD-10-CM

## 2020-06-01 DIAGNOSIS — K76.0 FATTY LIVER: ICD-10-CM

## 2020-06-01 DIAGNOSIS — R06.02 SHORTNESS OF BREATH: ICD-10-CM

## 2020-06-01 LAB
ALBUMIN SERPL-MCNC: 4.2 G/DL (ref 3.5–5.2)
ALBUMIN/GLOB SERPL: 1.3 G/DL
ALP SERPL-CCNC: 132 U/L (ref 39–117)
ALT SERPL W P-5'-P-CCNC: 59 U/L (ref 1–41)
ANION GAP SERPL CALCULATED.3IONS-SCNC: 15 MMOL/L (ref 5–15)
ARTERIAL PATENCY WRIST A: POSITIVE
AST SERPL-CCNC: 30 U/L (ref 1–40)
ATMOSPHERIC PRESS: 754 MMHG
BASE EXCESS BLDA CALC-SCNC: 1.7 MMOL/L (ref 0–2)
BASOPHILS # BLD AUTO: 0.04 10*3/MM3 (ref 0–0.2)
BASOPHILS NFR BLD AUTO: 0.4 % (ref 0–1.5)
BDY SITE: ABNORMAL
BILIRUB SERPL-MCNC: 0.5 MG/DL (ref 0.2–1.2)
BODY TEMPERATURE: 37 C
BUN BLD-MCNC: 21 MG/DL (ref 6–20)
BUN/CREAT SERPL: 23.3 (ref 7–25)
CALCIUM SPEC-SCNC: 9.2 MG/DL (ref 8.6–10.5)
CHLORIDE SERPL-SCNC: 104 MMOL/L (ref 98–107)
CO2 SERPL-SCNC: 21 MMOL/L (ref 22–29)
CREAT BLD-MCNC: 0.9 MG/DL (ref 0.76–1.27)
D DIMER PPP FEU-MCNC: 0.23 MG/L (FEU) (ref 0–0.5)
DEPRECATED RDW RBC AUTO: 36.3 FL (ref 37–54)
EOSINOPHIL # BLD AUTO: 0.2 10*3/MM3 (ref 0–0.4)
EOSINOPHIL NFR BLD AUTO: 2.2 % (ref 0.3–6.2)
ERYTHROCYTE [DISTWIDTH] IN BLOOD BY AUTOMATED COUNT: 12.4 % (ref 12.3–15.4)
GFR SERPL CREATININE-BSD FRML MDRD: 94 ML/MIN/1.73
GLOBULIN UR ELPH-MCNC: 3.2 GM/DL
GLUCOSE BLD-MCNC: 99 MG/DL (ref 65–99)
HCO3 BLDA-SCNC: 23.7 MMOL/L (ref 20–26)
HCT VFR BLD AUTO: 45.5 % (ref 37.5–51)
HGB BLD-MCNC: 15.7 G/DL (ref 13–17.7)
HOLD SPECIMEN: NORMAL
HOLD SPECIMEN: NORMAL
IMM GRANULOCYTES # BLD AUTO: 0.02 10*3/MM3 (ref 0–0.05)
IMM GRANULOCYTES NFR BLD AUTO: 0.2 % (ref 0–0.5)
LIPASE SERPL-CCNC: 41 U/L (ref 13–60)
LYMPHOCYTES # BLD AUTO: 3.02 10*3/MM3 (ref 0.7–3.1)
LYMPHOCYTES NFR BLD AUTO: 33 % (ref 19.6–45.3)
Lab: ABNORMAL
MCH RBC QN AUTO: 27.9 PG (ref 26.6–33)
MCHC RBC AUTO-ENTMCNC: 34.5 G/DL (ref 31.5–35.7)
MCV RBC AUTO: 81 FL (ref 79–97)
MODALITY: ABNORMAL
MONOCYTES # BLD AUTO: 0.68 10*3/MM3 (ref 0.1–0.9)
MONOCYTES NFR BLD AUTO: 7.4 % (ref 5–12)
NEUTROPHILS # BLD AUTO: 5.18 10*3/MM3 (ref 1.7–7)
NEUTROPHILS NFR BLD AUTO: 56.8 % (ref 42.7–76)
NRBC BLD AUTO-RTO: 0 /100 WBC (ref 0–0.2)
NT-PROBNP SERPL-MCNC: 8.1 PG/ML (ref 5–450)
PCO2 BLDA: 30.1 MM HG (ref 35–45)
PH BLDA: 7.5 PH UNITS (ref 7.35–7.45)
PLATELET # BLD AUTO: 240 10*3/MM3 (ref 140–450)
PMV BLD AUTO: 10.9 FL (ref 6–12)
PO2 BLDA: 99.7 MM HG (ref 83–108)
POTASSIUM BLD-SCNC: 3.8 MMOL/L (ref 3.5–5.2)
PROT SERPL-MCNC: 7.4 G/DL (ref 6–8.5)
RBC # BLD AUTO: 5.62 10*6/MM3 (ref 4.14–5.8)
SAO2 % BLDCOA: 98.8 % (ref 94–99)
SODIUM BLD-SCNC: 140 MMOL/L (ref 136–145)
T4 FREE SERPL-MCNC: 1.32 NG/DL (ref 0.93–1.7)
TROPONIN T SERPL-MCNC: <0.01 NG/ML (ref 0–0.03)
TSH SERPL DL<=0.05 MIU/L-ACNC: 2.65 UIU/ML (ref 0.27–4.2)
VENTILATOR MODE: ABNORMAL
WBC NRBC COR # BLD: 9.14 10*3/MM3 (ref 3.4–10.8)
WHOLE BLOOD HOLD SPECIMEN: NORMAL
WHOLE BLOOD HOLD SPECIMEN: NORMAL

## 2020-06-01 PROCEDURE — 99214 OFFICE O/P EST MOD 30 MIN: CPT | Performed by: FAMILY MEDICINE

## 2020-06-01 PROCEDURE — 93005 ELECTROCARDIOGRAM TRACING: CPT | Performed by: EMERGENCY MEDICINE

## 2020-06-01 PROCEDURE — 85379 FIBRIN DEGRADATION QUANT: CPT | Performed by: EMERGENCY MEDICINE

## 2020-06-01 PROCEDURE — 93005 ELECTROCARDIOGRAM TRACING: CPT

## 2020-06-01 PROCEDURE — 85025 COMPLETE CBC W/AUTO DIFF WBC: CPT

## 2020-06-01 PROCEDURE — 36415 COLL VENOUS BLD VENIPUNCTURE: CPT

## 2020-06-01 PROCEDURE — 71045 X-RAY EXAM CHEST 1 VIEW: CPT

## 2020-06-01 PROCEDURE — 36600 WITHDRAWAL OF ARTERIAL BLOOD: CPT

## 2020-06-01 PROCEDURE — 93010 ELECTROCARDIOGRAM REPORT: CPT | Performed by: INTERNAL MEDICINE

## 2020-06-01 PROCEDURE — 82803 BLOOD GASES ANY COMBINATION: CPT

## 2020-06-01 PROCEDURE — 83880 ASSAY OF NATRIURETIC PEPTIDE: CPT

## 2020-06-01 PROCEDURE — 84443 ASSAY THYROID STIM HORMONE: CPT | Performed by: EMERGENCY MEDICINE

## 2020-06-01 PROCEDURE — 99283 EMERGENCY DEPT VISIT LOW MDM: CPT

## 2020-06-01 PROCEDURE — 80053 COMPREHEN METABOLIC PANEL: CPT

## 2020-06-01 PROCEDURE — 84439 ASSAY OF FREE THYROXINE: CPT | Performed by: EMERGENCY MEDICINE

## 2020-06-01 PROCEDURE — 84484 ASSAY OF TROPONIN QUANT: CPT

## 2020-06-01 PROCEDURE — 83690 ASSAY OF LIPASE: CPT | Performed by: EMERGENCY MEDICINE

## 2020-06-01 RX ORDER — SODIUM CHLORIDE 0.9 % (FLUSH) 0.9 %
10 SYRINGE (ML) INJECTION AS NEEDED
Status: DISCONTINUED | OUTPATIENT
Start: 2020-06-01 | End: 2020-06-02 | Stop reason: HOSPADM

## 2020-06-01 RX ORDER — AMLODIPINE BESYLATE 5 MG/1
5 TABLET ORAL DAILY
Qty: 30 TABLET | Refills: 1 | Status: SHIPPED | OUTPATIENT
Start: 2020-06-01 | End: 2020-07-09

## 2020-06-01 NOTE — PROGRESS NOTES
Subjective   Hernandez Pizarro is a 39 y.o. male presenting with chief complaint of:   Chief Complaint   Patient presents with   • Follow-up     after episode of chest pain/seen in ER told to get referrals to [facility]       History of Present Illness :  Alone.  Here for primarily an acute issue today; recent chest pain.  ; was in [facility] so went to [facility]; has copy of labs and care everywhere activated after he left. No cough though feels thigh.  BP elevation with this.  No fever, hemoptysis, wheeze.     Has multiple chronic problems to consider that might have a bearing on today's issues; not an interval appointment.       Chronic/acute problems reviewed today:   1. Pulmonary hypertension (CMS/HCC): previous echo findings 2007; referred to cardiology with discovery of ROSALIA/started cpap.    2. Obstructive sleep apnea: compliant: Chronic/stable.  Uses cpap compliantly without significant problems with equipment.  Finds sleeps better and less sleepy during the day.        3. Obesity, unspecified classification, unspecified obesity type, unspecified whether serious comorbidity present: Chronic/stable.  Aware, advised weight loss before.  On/off some success with weight loss but often with weight gain back.      4. Fatty liver: Chronic/stable.  Aware treatment involves normalizing weight; usually including low fat diet and regular exercise.  Aware condition can go on to cirrhosis and death.  Stable occ liver labs and past imaging.       5. Chest pain, unspecified type: acute/above.  Not always with exertion   6. Elevated blood pressure reading: acute/with above     Has an/another acute issue today: none.    The following portions of the patient's history were reviewed and updated as appropriate: allergies, current medications, past family history, past medical history, past social history, past surgical history and problem list.      Current Outpatient Medications:   •  aspirin 81 MG EC tablet, Take 162 mg by  "mouth Daily As Needed (\"when my blood pressure is coming up\")., Disp: , Rfl:   •  furosemide (Lasix) 20 MG tablet, Take 1 tablet by mouth Daily., Disp: 30 tablet, Rfl: 5  •  amLODIPine (Norvasc) 5 MG tablet, Take 1 tablet by mouth Daily., Disp: 30 tablet, Rfl: 1  No current facility-administered medications for this visit.     Facility-Administered Medications Ordered in Other Visits:   •  sodium chloride 0.9 % flush 10 mL, 10 mL, Intravenous, PRN, Emergency, Triage Protocol, MD    No problems with medications.  Refills if needed done    No Known Allergies    Review of Systems  GENERAL:  Inactive/slower with limits, speed, stamina for age and SOB since this; prior . Sleep is ok usually; with cpap. No fever now/recent.  SKIN: No rash/skin lesion of concern.  ENDO:  No syncope, near or diaphoretic sweaty spells.  BS Ok past without download noted.  HEENT: No recent head injury; or headache.  No vision change.  No hearing loss.  Ears without pain/drainage.  No sore throat.  No significant nasal/sinus congestion/drainage. No epistaxis.  CHEST: No chest wall tenderness or mass. No significant cough,  without wheeze.  Recent SOB; no hemoptysis.  CV: No exertional chest pain, palpitations, occ/ankle edema.  GI: No dysphagia or heartburn.  No abdominal pain, diarrhea, constipation.  No rectal bleeding, or melena.    :  Voids without dysuria, or incontinence to completion.  ORTHO: No painful/swollen joints but various on /off sore.  No sore neck or back.  No acute neck or back pain without recent injury.  NEURO: No focal/significant weakness of extremities. No dizziness.   No numbness/paresthesias.   PSYCH: No memory loss.  Mood good usually; admits with this anxious without depressed but/and not suicidal.  Tries to tolerate stress .   Screening:  Mammogram: NA  Bone density: NA  Low dose CT chest: Tobacco-smoker/none  GI: NA  Prostate: NA  Usual lab order  NA    Results for orders placed during the hospital " encounter of 06/17/18   Adult Stress Echo W/ Cont or Stress Agent if Necessary Per Protocol    Narrative · Left ventricular systolic function is normal. Estimated EF = 55%.  · Normal stress echo with no significant echocardiographic evidence for   myocardial ischemia.          Lab Results:  Results for orders placed or performed during the hospital encounter of 01/17/20   Comprehensive Metabolic Panel   Result Value Ref Range    Glucose 131 (H) 65 - 99 mg/dL    BUN 21 (H) 6 - 20 mg/dL    Creatinine 0.78 0.76 - 1.27 mg/dL    Sodium 141 136 - 145 mmol/L    Potassium 4.1 3.5 - 5.2 mmol/L    Chloride 104 98 - 107 mmol/L    CO2 29.0 22.0 - 29.0 mmol/L    Calcium 8.8 8.6 - 10.5 mg/dL    Total Protein 7.1 6.0 - 8.5 g/dL    Albumin 4.10 3.50 - 5.20 g/dL    ALT (SGPT) 52 (H) 1 - 41 U/L    AST (SGOT) 32 1 - 40 U/L    Alkaline Phosphatase 125 (H) 39 - 117 U/L    Total Bilirubin 0.2 0.2 - 1.2 mg/dL    eGFR Non African Amer 111 >60 mL/min/1.73    Globulin 3.0 gm/dL    A/G Ratio 1.4 g/dL    BUN/Creatinine Ratio 26.9 (H) 7.0 - 25.0    Anion Gap 8.0 5.0 - 15.0 mmol/L   Troponin   Result Value Ref Range    Troponin T <0.010 0.000 - 0.030 ng/mL   Troponin   Result Value Ref Range    Troponin T <0.010 0.000 - 0.030 ng/mL   D-dimer, Quantitative   Result Value Ref Range    D-Dimer, Quantitative 0.26 0.00 - 0.50 mg/L (FEU)   CBC Auto Differential   Result Value Ref Range    WBC 9.96 3.40 - 10.80 10*3/mm3    RBC 5.02 4.14 - 5.80 10*6/mm3    Hemoglobin 14.3 13.0 - 17.7 g/dL    Hematocrit 41.5 37.5 - 51.0 %    MCV 82.7 79.0 - 97.0 fL    MCH 28.5 26.6 - 33.0 pg    MCHC 34.5 31.5 - 35.7 g/dL    RDW 12.7 12.3 - 15.4 %    RDW-SD 38.2 37.0 - 54.0 fl    MPV 10.5 6.0 - 12.0 fL    Platelets 247 140 - 450 10*3/mm3    Neutrophil % 59.8 42.7 - 76.0 %    Lymphocyte % 27.6 19.6 - 45.3 %    Monocyte % 9.1 5.0 - 12.0 %    Eosinophil % 2.7 0.3 - 6.2 %    Basophil % 0.5 0.0 - 1.5 %    Immature Grans % 0.3 0.0 - 0.5 %    Neutrophils, Absolute 5.95 1.70 -  "7.00 10*3/mm3    Lymphocytes, Absolute 2.75 0.70 - 3.10 10*3/mm3    Monocytes, Absolute 0.91 (H) 0.10 - 0.90 10*3/mm3    Eosinophils, Absolute 0.27 0.00 - 0.40 10*3/mm3    Basophils, Absolute 0.05 0.00 - 0.20 10*3/mm3    Immature Grans, Absolute 0.03 0.00 - 0.05 10*3/mm3    nRBC 0.0 0.0 - 0.2 /100 WBC   Light Blue Top   Result Value Ref Range    Extra Tube hold for add-on    Green Top (Gel)   Result Value Ref Range    Extra Tube Hold for add-ons.    Lavender Top   Result Value Ref Range    Extra Tube hold for add-on    Red Top   Result Value Ref Range    Extra Tube Hold for add-ons.        A1C:No results for input(s): HGBA1C in the last 02303 hours.  PSA:No results for input(s): PSA in the last 16487 hours.  CBC:  Lab Results - Last 18 Months   Lab Units 01/17/20  1658   WBC 10*3/mm3 9.96   HEMOGLOBIN g/dL 14.3   HEMATOCRIT % 41.5   PLATELETS 10*3/mm3 247      BMP/CMP:  Lab Results - Last 18 Months   Lab Units 01/17/20  1658   SODIUM mmol/L 141   POTASSIUM mmol/L 4.1   CHLORIDE mmol/L 104   CO2 mmol/L 29.0   BUN mg/dL 21*   CREATININE mg/dL 0.78   EGFR IF NONAFRICN AM mL/min/1.73 111   CALCIUM mg/dL 8.8     HEPATIC:  Lab Results - Last 18 Months   Lab Units 01/17/20  1658   ALT (SGPT) U/L 52*   AST (SGOT) U/L 32   ALK PHOS U/L 125*     THYROID:No results for input(s): TSH, T3FREE, FTI in the last 40479 hours.    Invalid input(s): T4FREE, T3, T4, TEUP,  TOTALT4    Objective   BP (!) 142/110   Pulse 91   Temp 98.3 °F (36.8 °C)   Resp 16   Ht 180.3 cm (71\")   Wt (!) 152 kg (335 lb)   SpO2 98%   BMI 46.72 kg/m²   Body mass index is 46.72 kg/m².     Recent Vitals       1/17/2020/17/2020 5/20/2020 6/1/2020       BP:  138/86  157/94 122/69 last noc on wrist cuff  (!) 142/110       122/69 last noc on wrist cuff      Pulse:  64  66  91     Temp:  98.7 °F (37.1 °C)  97.9 °F (36.6 °C)  98.3 °F (36.8 °C)     Weight:  --  (!) 150 kg (331 lb)  (!) 152 kg (335 lb)     BMI (Calculated):  --  46.2  46.7           Physical " Exam    GENERAL:  Well nourished/developed in no acute distress.   SKIN: Turgor excellent, without wound, rash, lesion.  HEENT: Normal cephalic without trauma.  Pupils equal round reactive to light. Extraocular motions full without nystagmus.   External canals nonobstructive nontender without reddness. Tymphatic membranes michelle with marilee structures intact.   Oral cavity without growths, exudates, and moist.  Posterior pharynx without mass, obstruction, redness.  No thyromegaly, mass, tenderness, lymphadenopathy and supple.  CV: Regular rhythm.  No murmur, gallop, edema. Posterior pulses intact.  No carotid bruits.  CHEST: No chest wall tenderness or mass.   LUNGS: Symmetric motion with clear to auscultation.  No dullness to percussion  ABD: Soft, nontender without mass.   ORTHO: Symmetric extremities without swelling/point tenderness.  Full gross range of motion.  NEURO: CN 2-12 grossly intact.  Symmetric facies and UE/LE. 3-4/5 strength throughout. 1/4 x bicep equal reflexes.  Nonfocal use extremities. Speech clear. Intact light touch with monofilament, vibratory sensation with tuning fork; equal toes/distal feet.    PSYCH: Oriented x 3.  Pleasant calm, well kept.  Purposeful/directed conservation with intact short/long gross memory.     Assessment/Plan     1. Pulmonary hypertension (CMS/HCC)    2. Obstructive sleep apnea: compliant    3. Obesity, unspecified classification, unspecified obesity type, unspecified whether serious comorbidity present    4. Fatty liver    5. Chest pain, unspecified type    6. Elevated blood pressure reading    7. Shortness of breath        Rx: reviewed/changes:  New Medications Ordered This Visit   Medications   • amLODIPine (Norvasc) 5 MG tablet     Sig: Take 1 tablet by mouth Daily.     Dispense:  30 tablet     Refill:  1       LAB/Testing/Referrals: reviewed/orders:   Today:   Orders Placed This Encounter   Procedures   • Hemoglobin A1c   • TSH   • T4, Free   • BNP      Chronic/recurrent labs above or change to:   To define     Discussions:   Add EST to echo  ER if worsens    Body mass index is 46.72 kg/m².  Patient's Body mass index is 46.72 kg/m². BMI is above normal parameters. Recommendations include: none (medical contraindication) and right now.    Tobacco use reviewed:    Non-smoker  Hernandez Pizarro  reports that he has never smoked. He has never used smokeless tobacco.    Patient Instructions   Your blood pressure was 142/110.   It is often a problem for blood pressures in the doctor's office to be higher than home (called white coat syndrome).   Goals for your blood pressure are 130-140 range top and 80-90 range bottom and you feeling ok.     We really advise rechecking your blood pressure at home (or with a friend) daily to every other day for the next several days and let us know if your pattern is not the goals above.  If you will do this make sure you control variables that can make your blood pressure show higher than it really is:   A. Use a machine that measures your blood pressure at the upper arm level; not wrist or lower  B. Take off any shirts/garmets and apply the cuff to your bare arm  C. Be sure and rest your arm being used on a table/like so it is totally supported  D. Do not cross your legs while taking your blood pressure  E. Be calm, rested and not upset/anxious in any way while taking your blood pressure  F. Avoid talking while taking the blood pressure  G. Be sure your back is supported and not in a strain while taking your blood pressure.     If you cannot do this at home/with a friend OR want it done here we are happy to make appointments here for that (we only charge/bill for a nurse visit for doing this).      Please CALL US if your blood pressure stays up as that probably means you have a problem; we likely will adjust things even over the phone.  We want your blood pressure to be normal.     OMRON is a reliable/common home blood automatic blood  pressure device that can range around 50-75$.     ########################          Follow up: Return for lab today/, THEN, Dr Park-1w.  Future Appointments   Date Time Provider Department Center   6/9/2020 12:30 PM PAD US 2 BH PAD US PAD   6/9/2020  1:00 PM PAD US 2 BH PAD US PAD   6/9/2020  2:00 PM PAD ECHO ROOM 2 [facility] PAD CARDI PAD   6/9/2020  3:00 PM PAD US NIVAS CART 2 BH PAD US PAD   6/10/2020 11:30 AM Frandy Park MD [facility] None   6/18/2020  3:45 PM Frandy Park MD [facility] None

## 2020-06-01 NOTE — PATIENT INSTRUCTIONS
Your blood pressure was 142/110.   It is often a problem for blood pressures in the doctor's office to be higher than home (called white coat syndrome).   Goals for your blood pressure are 130-140 range top and 80-90 range bottom and you feeling ok.     We really advise rechecking your blood pressure at home (or with a friend) daily to every other day for the next several days and let us know if your pattern is not the goals above.  If you will do this make sure you control variables that can make your blood pressure show higher than it really is:   A. Use a machine that measures your blood pressure at the upper arm level; not wrist or lower  B. Take off any shirts/garmets and apply the cuff to your bare arm  C. Be sure and rest your arm being used on a table/like so it is totally supported  D. Do not cross your legs while taking your blood pressure  E. Be calm, rested and not upset/anxious in any way while taking your blood pressure  F. Avoid talking while taking the blood pressure  G. Be sure your back is supported and not in a strain while taking your blood pressure.     If you cannot do this at home/with a friend OR want it done here we are happy to make appointments here for that (we only charge/bill for a nurse visit for doing this).      Please CALL US if your blood pressure stays up as that probably means you have a problem; we likely will adjust things even over the phone.  We want your blood pressure to be normal.     OMRON is a reliable/common home blood automatic blood pressure device that can range around 50-75$.     ########################

## 2020-06-01 NOTE — TELEPHONE ENCOUNTER
"Called pt and confirmed he will be here for his appt today at 3:30p and he said \"yes im going to be there\"  "

## 2020-06-01 NOTE — TELEPHONE ENCOUNTER
Elisa; I am not seeing anything; talk to me    Regarding: Test Results Question  Contact: [phone]  ----- Message from Elisa Chen LPN sent at 6/1/2020 10:41 AM CDT -----  Has appt today at 6/1/2020 3:30 PM     ----- Message from Hernandez Pizarro to Frandy Park MD sent at 5/29/2020  3:54 PM -----   Today's blood pressure 2:50 pm

## 2020-06-02 ENCOUNTER — TELEPHONE (OUTPATIENT)
Dept: FAMILY MEDICINE CLINIC | Facility: CLINIC | Age: 40
End: 2020-06-02

## 2020-06-02 ENCOUNTER — PATIENT MESSAGE (OUTPATIENT)
Dept: FAMILY MEDICINE CLINIC | Facility: CLINIC | Age: 40
End: 2020-06-02

## 2020-06-02 ENCOUNTER — HOSPITAL ENCOUNTER (OUTPATIENT)
Dept: CT IMAGING | Facility: HOSPITAL | Age: 40
Discharge: HOME | End: 2020-06-02
Admitting: FAMILY MEDICINE

## 2020-06-02 VITALS
SYSTOLIC BLOOD PRESSURE: 135 MMHG | BODY MASS INDEX: 44.1 KG/M2 | HEART RATE: 67 BPM | HEIGHT: 71 IN | TEMPERATURE: 98 F | DIASTOLIC BLOOD PRESSURE: 87 MMHG | WEIGHT: 315 LBS | RESPIRATION RATE: 18 BRPM | OXYGEN SATURATION: 98 %

## 2020-06-02 DIAGNOSIS — R06.02 SOB (SHORTNESS OF BREATH): ICD-10-CM

## 2020-06-02 DIAGNOSIS — R07.9 CHEST PAIN, UNSPECIFIED TYPE: ICD-10-CM

## 2020-06-02 DIAGNOSIS — R06.02 SOB (SHORTNESS OF BREATH): Primary | ICD-10-CM

## 2020-06-02 PROCEDURE — 0 IOPAMIDOL PER 1 ML: Performed by: FAMILY MEDICINE

## 2020-06-02 PROCEDURE — 71275 CT ANGIOGRAPHY CHEST: CPT

## 2020-06-02 RX ADMIN — IOPAMIDOL 100 ML: 755 INJECTION, SOLUTION INTRAVENOUS at 15:22

## 2020-06-02 NOTE — TELEPHONE ENCOUNTER
"Called pt and advised    1-How is his blood pressure today \"seems like it has come down some\" 137/84    2-How is his SOB \"it is about the same, has not changed\"  "

## 2020-06-02 NOTE — ED PROVIDER NOTES
Subjective   Patient presents with a complaint of shortness of breath that he says actually is been going on since January which would be 5 months now.  He says it is intermittent at times.  He cannot think anything it makes it worse or makes it better.  He had his initial episode in January and has had several since then.  He had a particular bad episode on Thursday 4 days ago when he was on his way to [place].  He was seen in the emergency room there [place] had multiple testing and was told everything was okay.  He saw his doctor after that was put on medication for high blood pressure.  He says he is never smoked and never had any other respiratory problems.  He denies any chest pain.  He just describes the shortness of breath.  He says he has noticed that he seems to have a little difficulty eating at times and has been eating a lot more soups because he cannot seem to get things down into his stomach as he feels as normal.      History provided by:  Patient   used: No    Shortness of Breath   Severity:  Moderate  Onset quality:  Gradual  Duration:  5 months  Timing:  Intermittent  Progression:  Worsening  Chronicity:  New  Context: not activity, not animal exposure, not emotional upset, not fumes, not known allergens, not occupational exposure, not pollens, not smoke exposure, not strong odors, not URI and not weather changes    Relieved by:  Nothing  Worsened by:  Nothing  Ineffective treatments:  None tried  Associated symptoms: no abdominal pain, no chest pain, no claudication, no cough, no diaphoresis, no ear pain, no fever, no headaches, no hemoptysis, no neck pain, no PND, no rash, no sore throat, no sputum production, no syncope, no swollen glands, no vomiting and no wheezing    Risk factors: obesity    Risk factors: no recent alcohol use, no family hx of DVT, no hx of cancer, no hx of PE/DVT, no oral contraceptive use, no prolonged immobilization, no recent surgery and  "no tobacco use        Review of Systems   Constitutional: Negative.  Negative for diaphoresis and fever.   HENT: Negative.  Negative for ear pain and sore throat.    Respiratory: Positive for shortness of breath. Negative for cough, hemoptysis, sputum production and wheezing.    Cardiovascular: Negative.  Negative for chest pain, claudication, syncope and PND.   Gastrointestinal: Negative.  Negative for abdominal pain and vomiting.   Genitourinary: Negative.    Musculoskeletal: Negative.  Negative for neck pain.   Skin: Negative.  Negative for rash.   Neurological: Negative.  Negative for headaches.   Psychiatric/Behavioral: Negative.    All other systems reviewed and are negative.      Past Medical History:   Diagnosis Date   • COPD (chronic obstructive pulmonary disease) (CMS/HCC)    • Encephalitis    • Pancreatitis    • Pulmonary hypertension (CMS/[facility])     patient states he haad a tralee event while giving blood and went into pulmonary htn       No Known Allergies    Past Surgical History:   Procedure Laterality Date   • CHOLECYSTECTOMY     • SINUS SURGERY         History reviewed. No pertinent family history.    Social History     Socioeconomic History   • Marital status:      Spouse name: Yudelka   • Number of children: 2   • Years of education: 14   • Highest education level: Some college, no degree   Occupational History   • Occupation:    Tobacco Use   • Smoking status: Never Smoker   • Smokeless tobacco: Never Used   Substance and Sexual Activity   • Alcohol use: No   • Drug use: No   • Sexual activity: Yes     Partners: Female     Birth control/protection: Surgical       Prior to Admission medications    Medication Sig Start Date End Date Taking? Authorizing Provider   amLODIPine (Norvasc) 5 MG tablet Take 1 tablet by mouth Daily. 6/1/20   Frandy Park MD   aspirin 81 MG EC tablet Take 162 mg by mouth Daily As Needed (\"when my blood pressure is coming up\").    Provider, " MD Michael   furosemide (Lasix) 20 MG tablet Take 1 tablet by mouth Daily. 5/20/20   Frandy Park MD   coenzyme Q10 100 MG capsule Take 100 mg by mouth Daily.  6/1/20  Emergency, Nurse Epic, RN   furosemide (LASIX) 20 MG tablet Take 1 tablet by mouth Daily. 1/17/20 6/1/20  Mariusz Rosas MD   Menaquinone-7 (VITAMIN K2) 100 MCG capsule Take 100 mcg by mouth.  6/1/20  Provider, MD Michael   milk thistle 175 MG tablet Take 175 mg by mouth Daily.  6/1/20  Emergency, Nurse Epic, RN       Medications - No data to display    Vitals:    06/02/20 0026   BP: 135/87   Pulse: 67   Resp: 18   Temp: 98 °F (36.7 °C)   SpO2: 98%         Objective   Physical Exam   Constitutional: He is oriented to person, place, and time. He appears well-developed and well-nourished.   HENT:   Head: Normocephalic and atraumatic.   Neck: Normal range of motion. Neck supple.   Cardiovascular: Normal rate and regular rhythm.   Pulmonary/Chest: Effort normal and breath sounds normal.   Abdominal: Soft. Bowel sounds are normal.   Markedly obese.   Musculoskeletal: Normal range of motion.        Right lower leg: Normal.        Left lower leg: Normal.   Neurological: He is alert and oriented to person, place, and time.   Skin: Skin is warm and dry.   Psychiatric: He has a normal mood and affect. His behavior is normal.   Nursing note and vitals reviewed.      Procedures         Lab Results (last 24 hours)     Procedure Component Value Units Date/Time    CBC & Differential [[id number]] Collected:  06/01/20 2116    Specimen:  Blood Updated:  06/01/20 2124    Narrative:       The following orders were created for panel order CBC & Differential.  Procedure                               Abnormality         Status                     ---------                               -----------         ------                     CBC Auto Differential[[id number]]        Abnormal            Final result                 Please view results for these  tests on the individual orders.    Comprehensive Metabolic Panel [[id number]]  (Abnormal) Collected:  06/01/20 2116    Specimen:  Blood Updated:  06/01/20 2142     Glucose 99 mg/dL      BUN 21 mg/dL      Creatinine 0.90 mg/dL      Sodium 140 mmol/L      Potassium 3.8 mmol/L      Chloride 104 mmol/L      CO2 21.0 mmol/L      Calcium 9.2 mg/dL      Total Protein 7.4 g/dL      Albumin 4.20 g/dL      ALT (SGPT) 59 U/L      AST (SGOT) 30 U/L      Alkaline Phosphatase 132 U/L      Total Bilirubin 0.5 mg/dL      eGFR Non African Amer 94 mL/min/1.73      Globulin 3.2 gm/dL      A/G Ratio 1.3 g/dL      BUN/Creatinine Ratio 23.3     Anion Gap 15.0 mmol/L     Narrative:       GFR Normal >60  Chronic Kidney Disease <60  Kidney Failure <15      BNP [[id number]]  (Normal) Collected:  06/01/20 2116    Specimen:  Blood Updated:  06/01/20 2140     proBNP 8.1 pg/mL     Narrative:       Among patients with dyspnea, NT-proBNP is highly sensitive for the detection of acute congestive heart failure. In addition NT-proBNP of <300 pg/ml effectively rules out acute congestive heart failure with 99% negative predictive value.    Results may be falsely decreased if patient taking Biotin.      Troponin [[id number]]  (Normal) Collected:  06/01/20 2116    Specimen:  Blood Updated:  06/01/20 2141     Troponin T <0.010 ng/mL     Narrative:       Troponin T Reference Range:  <= 0.03 ng/mL-   Negative for AMI  >0.03 ng/mL-     Abnormal for myocardial necrosis.  Clinicians would have to utilize clinical acumen, EKG, Troponin and serial changes to determine if it is an Acute Myocardial Infarction or myocardial injury due to an underlying chronic condition.       Results may be falsely decreased if patient taking Biotin.      CBC Auto Differential [[id number]]  (Abnormal) Collected:  06/01/20 2116    Specimen:  Blood Updated:  06/01/20 2124     WBC 9.14 10*3/mm3      RBC 5.62 10*6/mm3      Hemoglobin 15.7 g/dL      Hematocrit 45.5 %      MCV 81.0 fL       MCH 27.9 pg      MCHC 34.5 g/dL      RDW 12.4 %      RDW-SD 36.3 fl      MPV 10.9 fL      Platelets 240 10*3/mm3      Neutrophil % 56.8 %      Lymphocyte % 33.0 %      Monocyte % 7.4 %      Eosinophil % 2.2 %      Basophil % 0.4 %      Immature Grans % 0.2 %      Neutrophils, Absolute 5.18 10*3/mm3      Lymphocytes, Absolute 3.02 10*3/mm3      Monocytes, Absolute 0.68 10*3/mm3      Eosinophils, Absolute 0.20 10*3/mm3      Basophils, Absolute 0.04 10*3/mm3      Immature Grans, Absolute 0.02 10*3/mm3      nRBC 0.0 /100 WBC     D-dimer, Quantitative [[id number]]  (Normal) Collected:  06/01/20 2116    Specimen:  Blood Updated:  06/01/20 2249     D-Dimer, Quantitative 0.23 mg/L (FEU)     Narrative:       Reference Range is 0-0.50 mg/L FEU. However, results <0.50 mg/L FEU tends to rule out DVT or PE. Results >0.50 mg/L FEU are not useful in predicting absence or presence of DVT or PE.      Lipase [[id number]]  (Normal) Collected:  06/01/20 2116    Specimen:  Blood Updated:  06/01/20 2213     Lipase 41 U/L     TSH [[id number]]  (Normal) Collected:  06/01/20 2116    Specimen:  Blood Updated:  06/01/20 2357     TSH 2.650 uIU/mL     T4, Free [[id number]]  (Normal) Collected:  06/01/20 2116    Specimen:  Blood Updated:  06/01/20 2357     Free T4 1.32 ng/dL     Narrative:       Results may be falsely increased if patient taking Biotin.      Blood Gas, Arterial [[id number]]  (Abnormal) Collected:  06/01/20 2320    Specimen:  Arterial Blood Updated:  06/01/20 2325     Site Right Radial     Mt's Test Positive     pH, Arterial 7.505 pH units      Comment: 83 Value above reference range        pCO2, Arterial 30.1 mm Hg      Comment: 84 Value below reference range        pO2, Arterial 99.7 mm Hg      HCO3, Arterial 23.7 mmol/L      Base Excess, Arterial 1.7 mmol/L      O2 Saturation, Arterial 98.8 %      Temperature 37.0 C      Barometric Pressure for Blood Gas 754 mmHg      Modality Room Air     Ventilator Mode NA     Collected by  [id number]     Comment: Meter: [id number]     :  [id number]             XR Chest 1 View   ED Interpretation   NAD          ED Course  ED Course as of Jun 02 0425 Tue Jun 02, 2020 0424 I told the patient his testing here all seem to be okay.  I did not initially order blood gases but he wanted them in order to make sure his CO2 level was fine but I told him it was actually a little low consistent with hyperventilation.  I do not have an explanation for his shortness of breath.  There are many things that could cause this and he needs to follow-up with his family physician to get further evaluation as indicated.  He says that his doctor has test planned but he felt worse tonight.  He says that Dr. Park spoke about put him in the hospital to get the test run but I explained to him that I did not find any reason to put him in the hospital the present time.  Certainly there is no signs of any critical at the present point.  He accepts this and says he will follow-up with Dr. Park.  He is discharged in stable condition.    [TR]      ED Course User Index  [TR] Robbi Herrmann Jr., MD          MDM  Number of Diagnoses or Management Options  Dyspnea, unspecified type: new and requires workup     Amount and/or Complexity of Data Reviewed  Clinical lab tests: ordered and reviewed  Tests in the radiology section of CPT®: ordered and reviewed  Tests in the medicine section of CPT®: reviewed and ordered  Independent visualization of images, tracings, or specimens: yes    Risk of Complications, Morbidity, and/or Mortality  Presenting problems: moderate  Diagnostic procedures: moderate  Management options: moderate    Patient Progress  Patient progress: stable      Final diagnoses:   Dyspnea, unspecified type          Robbi Herrmann Jr., MD  06/02/20 4758

## 2020-06-02 NOTE — TELEPHONE ENCOUNTER
I put in order/ro    CT Angio, Chest? With/without contrast? May need precert and unable to get today, will try?

## 2020-06-02 NOTE — TELEPHONE ENCOUNTER
Elisa: How is his blood pressure today  How is his SOB    Luly: how are we coming on moving up EST and echo    Regarding: Test Results Question  Contact: [phone]  ----- Message from Elisa Chen LPN sent at 6/2/2020  9:15 AM CDT -----  My chart message     ----- Message from Hernandez Pizarro to Frandy Park MD sent at 6/2/2020  9:58 AM -----   I had a bunch of tests run at [facility] last night. They did the Thyroid and a blood gas and both came back clear. I wasn't sure if [facility] had openings to move current tests up or if I need any other tests like checking for Hiatal hernia, as it seems the possibilities for the symptoms are dwindling with clean tests.

## 2020-06-03 ENCOUNTER — TELEPHONE (OUTPATIENT)
Dept: FAMILY MEDICINE CLINIC | Facility: CLINIC | Age: 40
End: 2020-06-03

## 2020-06-03 NOTE — TELEPHONE ENCOUNTER
I agree; but finish heart and get bp correct first; gi will now want to do anything until this is accomplished    Regarding: Referral Request  Contact: [phone]  ----- Message from Elisa Chen LPN sent at 6/3/2020  8:07 AM CDT -----  My chart message     ----- Message from Hernandez Pizarro to Frandy Park MD sent at 6/2/2020  5:58 PM -----   I may need to see an gastroenterologist, see if this a hiatal hernia that has progressed to a severe degree. Back during my pancreatic flare up in 2002 the doctor said I had a small one then when they ran a scope down my throat. Have been having alot of burning gas come up when pushing on my upper abdomen. That part of my stomach is hard as a rock.

## 2020-06-04 ENCOUNTER — APPOINTMENT (OUTPATIENT)
Dept: GENERAL RADIOLOGY | Facility: HOSPITAL | Age: 40
End: 2020-06-04

## 2020-06-04 ENCOUNTER — APPOINTMENT (OUTPATIENT)
Dept: CT IMAGING | Facility: HOSPITAL | Age: 40
End: 2020-06-04

## 2020-06-04 ENCOUNTER — HOSPITAL ENCOUNTER (OUTPATIENT)
Dept: CARDIOLOGY | Facility: HOSPITAL | Age: 40
Discharge: HOME | End: 2020-06-04
Admitting: FAMILY MEDICINE

## 2020-06-04 ENCOUNTER — HOSPITAL ENCOUNTER (EMERGENCY)
Facility: HOSPITAL | Age: 40
Discharge: HOME | End: 2020-06-04
Admitting: INTERNAL MEDICINE

## 2020-06-04 VITALS
SYSTOLIC BLOOD PRESSURE: 131 MMHG | DIASTOLIC BLOOD PRESSURE: 90 MMHG | HEIGHT: 71 IN | TEMPERATURE: 97.8 F | HEART RATE: 75 BPM | RESPIRATION RATE: 18 BRPM | OXYGEN SATURATION: 99 % | WEIGHT: 315 LBS | BODY MASS INDEX: 44.1 KG/M2

## 2020-06-04 VITALS
SYSTOLIC BLOOD PRESSURE: 136 MMHG | HEIGHT: 71 IN | BODY MASS INDEX: 44.1 KG/M2 | HEART RATE: 75 BPM | DIASTOLIC BLOOD PRESSURE: 81 MMHG | WEIGHT: 315 LBS

## 2020-06-04 DIAGNOSIS — I27.20 PULMONARY HYPERTENSION (HCC): ICD-10-CM

## 2020-06-04 DIAGNOSIS — R13.10 DYSPHAGIA, UNSPECIFIED TYPE: Primary | ICD-10-CM

## 2020-06-04 DIAGNOSIS — E66.9 OBESITY, UNSPECIFIED CLASSIFICATION, UNSPECIFIED OBESITY TYPE, UNSPECIFIED WHETHER SERIOUS COMORBIDITY PRESENT: ICD-10-CM

## 2020-06-04 DIAGNOSIS — R07.9 CHEST PAIN, UNSPECIFIED TYPE: ICD-10-CM

## 2020-06-04 DIAGNOSIS — R03.0 ELEVATED BLOOD PRESSURE READING: ICD-10-CM

## 2020-06-04 DIAGNOSIS — R06.02 SHORTNESS OF BREATH: ICD-10-CM

## 2020-06-04 DIAGNOSIS — R10.9 ABDOMINAL PAIN, UNSPECIFIED ABDOMINAL LOCATION: ICD-10-CM

## 2020-06-04 LAB
ALBUMIN SERPL-MCNC: 3.9 G/DL (ref 3.5–5.2)
ALBUMIN/GLOB SERPL: 1.2 G/DL
ALP SERPL-CCNC: 119 U/L (ref 39–117)
ALT SERPL W P-5'-P-CCNC: 58 U/L (ref 1–41)
AMPHET+METHAMPHET UR QL: NEGATIVE
AMPHETAMINES UR QL: NEGATIVE
ANION GAP SERPL CALCULATED.3IONS-SCNC: 12 MMOL/L (ref 5–15)
AST SERPL-CCNC: 32 U/L (ref 1–40)
BARBITURATES UR QL SCN: NEGATIVE
BASOPHILS # BLD AUTO: 0.04 10*3/MM3 (ref 0–0.2)
BASOPHILS NFR BLD AUTO: 0.5 % (ref 0–1.5)
BENZODIAZ UR QL SCN: NEGATIVE
BILIRUB SERPL-MCNC: 0.3 MG/DL (ref 0.2–1.2)
BILIRUB UR QL STRIP: NEGATIVE
BUN BLD-MCNC: 23 MG/DL (ref 6–20)
BUN/CREAT SERPL: 23.7 (ref 7–25)
BUPRENORPHINE SERPL-MCNC: NEGATIVE NG/ML
CALCIUM SPEC-SCNC: 8.5 MG/DL (ref 8.6–10.5)
CANNABINOIDS SERPL QL: NEGATIVE
CHLORIDE SERPL-SCNC: 106 MMOL/L (ref 98–107)
CLARITY UR: CLEAR
CO2 SERPL-SCNC: 23 MMOL/L (ref 22–29)
COCAINE UR QL: NEGATIVE
COLOR UR: YELLOW
CREAT BLD-MCNC: 0.97 MG/DL (ref 0.76–1.27)
DEPRECATED RDW RBC AUTO: 37 FL (ref 37–54)
EOSINOPHIL # BLD AUTO: 0.12 10*3/MM3 (ref 0–0.4)
EOSINOPHIL NFR BLD AUTO: 1.5 % (ref 0.3–6.2)
ERYTHROCYTE [DISTWIDTH] IN BLOOD BY AUTOMATED COUNT: 12.5 % (ref 12.3–15.4)
ETHANOL UR QL: 0.03 %
GFR SERPL CREATININE-BSD FRML MDRD: 86 ML/MIN/1.73
GLOBULIN UR ELPH-MCNC: 3.2 GM/DL
GLUCOSE BLD-MCNC: 126 MG/DL (ref 65–99)
GLUCOSE UR STRIP-MCNC: NEGATIVE MG/DL
HCT VFR BLD AUTO: 41.9 % (ref 37.5–51)
HGB BLD-MCNC: 14.4 G/DL (ref 13–17.7)
HGB UR QL STRIP.AUTO: NEGATIVE
IMM GRANULOCYTES # BLD AUTO: 0.02 10*3/MM3 (ref 0–0.05)
IMM GRANULOCYTES NFR BLD AUTO: 0.2 % (ref 0–0.5)
KETONES UR QL STRIP: NEGATIVE
LEUKOCYTE ESTERASE UR QL STRIP.AUTO: NEGATIVE
LYMPHOCYTES # BLD AUTO: 2.18 10*3/MM3 (ref 0.7–3.1)
LYMPHOCYTES NFR BLD AUTO: 26.8 % (ref 19.6–45.3)
MCH RBC QN AUTO: 28 PG (ref 26.6–33)
MCHC RBC AUTO-ENTMCNC: 34.4 G/DL (ref 31.5–35.7)
MCV RBC AUTO: 81.5 FL (ref 79–97)
METHADONE UR QL SCN: NEGATIVE
MONOCYTES # BLD AUTO: 0.83 10*3/MM3 (ref 0.1–0.9)
MONOCYTES NFR BLD AUTO: 10.2 % (ref 5–12)
NEUTROPHILS # BLD AUTO: 4.95 10*3/MM3 (ref 1.7–7)
NEUTROPHILS NFR BLD AUTO: 60.8 % (ref 42.7–76)
NITRITE UR QL STRIP: NEGATIVE
NRBC BLD AUTO-RTO: 0 /100 WBC (ref 0–0.2)
NT-PROBNP SERPL-MCNC: 8.4 PG/ML (ref 5–450)
OPIATES UR QL: NEGATIVE
OXYCODONE UR QL SCN: NEGATIVE
PCP UR QL SCN: NEGATIVE
PH UR STRIP.AUTO: 6 [PH] (ref 5–8)
PLATELET # BLD AUTO: 236 10*3/MM3 (ref 140–450)
PMV BLD AUTO: 11.2 FL (ref 6–12)
POTASSIUM BLD-SCNC: 3.8 MMOL/L (ref 3.5–5.2)
PROPOXYPH UR QL: NEGATIVE
PROT SERPL-MCNC: 7.1 G/DL (ref 6–8.5)
PROT UR QL STRIP: NEGATIVE
RBC # BLD AUTO: 5.14 10*6/MM3 (ref 4.14–5.8)
S PYO AG THROAT QL: NEGATIVE
SODIUM BLD-SCNC: 141 MMOL/L (ref 136–145)
SP GR UR STRIP: 1.02 (ref 1–1.03)
TRICYCLICS UR QL SCN: NEGATIVE
TROPONIN T SERPL-MCNC: <0.01 NG/ML (ref 0–0.03)
UROBILINOGEN UR QL STRIP: NORMAL
WBC NRBC COR # BLD: 8.14 10*3/MM3 (ref 3.4–10.8)

## 2020-06-04 PROCEDURE — 93352 ADMIN ECG CONTRAST AGENT: CPT | Performed by: INTERNAL MEDICINE

## 2020-06-04 PROCEDURE — 93005 ELECTROCARDIOGRAM TRACING: CPT | Performed by: NURSE PRACTITIONER

## 2020-06-04 PROCEDURE — 99283 EMERGENCY DEPT VISIT LOW MDM: CPT

## 2020-06-04 PROCEDURE — 93010 ELECTROCARDIOGRAM REPORT: CPT | Performed by: INTERNAL MEDICINE

## 2020-06-04 PROCEDURE — 93017 CV STRESS TEST TRACING ONLY: CPT

## 2020-06-04 PROCEDURE — 84484 ASSAY OF TROPONIN QUANT: CPT | Performed by: NURSE PRACTITIONER

## 2020-06-04 PROCEDURE — 87880 STREP A ASSAY W/OPTIC: CPT | Performed by: NURSE PRACTITIONER

## 2020-06-04 PROCEDURE — 70491 CT SOFT TISSUE NECK W/DYE: CPT

## 2020-06-04 PROCEDURE — 0 IOPAMIDOL PER 1 ML: Performed by: NURSE PRACTITIONER

## 2020-06-04 PROCEDURE — 71045 X-RAY EXAM CHEST 1 VIEW: CPT

## 2020-06-04 PROCEDURE — 83880 ASSAY OF NATRIURETIC PEPTIDE: CPT | Performed by: NURSE PRACTITIONER

## 2020-06-04 PROCEDURE — 80307 DRUG TEST PRSMV CHEM ANLYZR: CPT | Performed by: NURSE PRACTITIONER

## 2020-06-04 PROCEDURE — 80053 COMPREHEN METABOLIC PANEL: CPT | Performed by: NURSE PRACTITIONER

## 2020-06-04 PROCEDURE — 81003 URINALYSIS AUTO W/O SCOPE: CPT | Performed by: NURSE PRACTITIONER

## 2020-06-04 PROCEDURE — 87081 CULTURE SCREEN ONLY: CPT | Performed by: NURSE PRACTITIONER

## 2020-06-04 PROCEDURE — 85025 COMPLETE CBC W/AUTO DIFF WBC: CPT | Performed by: NURSE PRACTITIONER

## 2020-06-04 PROCEDURE — 25010000002 PERFLUTREN 6.52 MG/ML SUSPENSION: Performed by: INTERNAL MEDICINE

## 2020-06-04 PROCEDURE — 74177 CT ABD & PELVIS W/CONTRAST: CPT

## 2020-06-04 PROCEDURE — 93350 STRESS TTE ONLY: CPT

## 2020-06-04 PROCEDURE — 93018 CV STRESS TEST I&R ONLY: CPT | Performed by: INTERNAL MEDICINE

## 2020-06-04 PROCEDURE — 93350 STRESS TTE ONLY: CPT | Performed by: INTERNAL MEDICINE

## 2020-06-04 RX ORDER — SODIUM CHLORIDE 0.9 % (FLUSH) 0.9 %
10 SYRINGE (ML) INJECTION AS NEEDED
Status: DISCONTINUED | OUTPATIENT
Start: 2020-06-04 | End: 2020-06-05 | Stop reason: HOSPADM

## 2020-06-04 RX ADMIN — IOPAMIDOL 125 ML: 755 INJECTION, SOLUTION INTRAVENOUS at 19:57

## 2020-06-04 RX ADMIN — PERFLUTREN 8.48 MG: 6.52 INJECTION, SUSPENSION INTRAVENOUS at 11:06

## 2020-06-04 NOTE — ED PROVIDER NOTES
"Subjective   Patient is a 39-year-old male that presents to the ER today with complaint of shortness of breath and difficulty swallowing.  The patient reports that he has had shortness of breath intermittently since January.  He is currently undergoing a work up for this.  The patient states that his shortness of breath is the same today as what it normally is.  He denies any cough.  The patient was recently seen in this ER on June 1, 2020.  At that time the patient had a evaluation done that showed no acute findings and was discharged home.  The patient followed up with his primary care provider and had a CTA chest done yesterday.  I reviewed these results and this showed no acute findings.  The patient had a stress test done today.  The results of this are currently still pending.  The patient denies any chest pain today.  He reports that he has had what he describes as \"heartburn\".  He states it is a burning feeling from his stomach that goes up into his chest.  He states that today he has had difficulty swallowing.  He states that recently for the past several weeks anytime he eats his blood pressure elevates and then he sometimes has trouble swallowing after this.  He states that today he had 4 episodes where he was not even able to swallow his spit as he felt like his throat was closing.  The denies any recently new medications.  He states that he has had trouble with aphasia intermittently.  Patient states that last evening he has had right upper quadrant abdominal pain and felt a mass in his right upper quadrant area.  He states that the mass seem to shift and when this occurred he is shortness of breath seem to somewhat resolved.  He is concerned that he has a hiatal hernia.  The patient presents to the ER today for heartburn, and difficulty swallowing.  Patient was also recently seen in the ER in [place] several weeks ago for similar complaints.  He tells me that his work-up was negative at that " time.      History provided by:  Patient   used: No    Difficulty Swallowing   Location:  Difficulty eating and drinking today x 4 occasions, pt denies choking on any food or food stuck, denies difficulty swallowing spit  Severity:  Mild  Onset quality:  Sudden  Duration:  1 day  Timing:  Constant  Progression:  Unchanged  Chronicity:  New  Associated symptoms: abdominal pain and shortness of breath    Associated symptoms: no chest pain, no congestion, no cough, no diarrhea, no ear pain, no fatigue, no fever, no headaches, no loss of consciousness, no myalgias, no nausea, no rash, no rhinorrhea, no sore throat, no vomiting and no wheezing        Review of Systems   Constitutional: Negative for fatigue and fever.   HENT: Negative for congestion, ear pain, rhinorrhea and sore throat.    Respiratory: Positive for shortness of breath. Negative for cough and wheezing.    Cardiovascular: Negative for chest pain.   Gastrointestinal: Positive for abdominal pain. Negative for diarrhea, nausea and vomiting.   Musculoskeletal: Negative for myalgias.   Skin: Negative for rash.   Neurological: Negative for loss of consciousness and headaches.   All other systems reviewed and are negative.      Past Medical History:   Diagnosis Date   • COPD (chronic obstructive pulmonary disease) (CMS/HCC)    • Encephalitis    • Pancreatitis    • Pulmonary hypertension (CMS/HCC)     patient states he haad a tralee event while giving blood and went into pulmonary htn       No Known Allergies    Past Surgical History:   Procedure Laterality Date   • CHOLECYSTECTOMY     • SINUS SURGERY         No family history on file.    Social History     Socioeconomic History   • Marital status:      Spouse name: Yudelka   • Number of children: 2   • Years of education: 14   • Highest education level: Some college, no degree   Occupational History   • Occupation:    Tobacco Use   • Smoking status: Never Smoker   •  Smokeless tobacco: Never Used   Substance and Sexual Activity   • Alcohol use: No   • Drug use: No   • Sexual activity: Yes     Partners: Female     Birth control/protection: Surgical           Objective   Physical Exam   Constitutional: He is oriented to person, place, and time. He appears well-developed and well-nourished.   HENT:   Head: Normocephalic and atraumatic.   Right Ear: External ear normal.   Left Ear: External ear normal.   Nose: Nose normal.   Mouth/Throat: Uvula is midline, oropharynx is clear and moist and mucous membranes are normal. Tonsils are 0 on the right. Tonsils are 0 on the left. No tonsillar exudate.   Eyes: Conjunctivae are normal.   Cardiovascular: Normal rate, regular rhythm and normal heart sounds.   Pulmonary/Chest: Effort normal and breath sounds normal.   Abdominal: Soft. Bowel sounds are normal.   Neurological: He is alert and oriented to person, place, and time.   Skin: Skin is warm and dry. Capillary refill takes less than 2 seconds.   Psychiatric: He has a normal mood and affect.   Nursing note and vitals reviewed.      Procedures           ED Course  ED Course as of Jun 09 1449   Thu Jun 04, 2020   1823 The pt stress test has not been read at this time. CTA chest from 6/2/2020 shows:    1. No pulmonary emboli.  2. No acute intrathoracic abnormality. Lungs are clear. No pathologic  lymphadenopathy.    [LF]   1830 Patient had a recent CT scan done 2 days ago with contrast.  I did discuss this with the patient.  I advised him that having more contrast could create kidney problems for him.  I have also advised that the more radiation that the more CT scans that he has this can cause problems for him later including developing a cancer and lymphoma.  He understands this would like to proceed with a CT scans today.    [LF]   2115 XR Chest 1 View [LF]   Tue Jun 09, 2020   1448 Patient's lab work was unremarkable.  He had no complaints of chest pain today.    [LF]   1448 The CT scan of  the abdomen and pelvis as well as a CT scan soft tissue neck showed no acute findings.  I did discuss this with the patient.  We are pending his stress test results however he has had no chest pain that he reports today.  At this time the patient symptoms of been ongoing since January.  I recommended that he follow-up his primary care provider in 1 day for a recheck and to discuss further evaluation of his symptoms.  At this time will be discharged home in stable condition.    [LF]      ED Course User Index  [LF] Yesenia Reyes, SCOOTER                                 CT Soft Tissue Neck With Contrast   Final Result      CT Abdomen Pelvis With Contrast   Final Result      XR Chest 1 View   Final Result   1. No acute disease.           This report was finalized on 06/04/2020 19:01 by Dr. Lamonte Tai MD.        Labs Reviewed   COMPREHENSIVE METABOLIC PANEL - Abnormal; Notable for the following components:       Result Value    Glucose 126 (*)     BUN 23 (*)     Calcium 8.5 (*)     ALT (SGPT) 58 (*)     Alkaline Phosphatase 119 (*)     All other components within normal limits    Narrative:     GFR Normal >60  Chronic Kidney Disease <60  Kidney Failure <15     RAPID STREP A SCREEN - Normal   BETA HEMOLYTIC STREP CULTURE, THROAT - Normal    Narrative:     Group A Strep incidence is low in adults. Positive culture for Beta hemolytic Streptococcus species can reflect colonization and not true infection. Please correlate clinically.   BNP (IN-HOUSE) - Normal    Narrative:     Among patients with dyspnea, NT-proBNP is highly sensitive for the detection of acute congestive heart failure. In addition NT-proBNP of <300 pg/ml effectively rules out acute congestive heart failure with 99% negative predictive value.    Results may be falsely decreased if patient taking Biotin.     URINALYSIS W/ CULTURE IF INDICATED - Normal    Narrative:     Urine microscopic not indicated.   TROPONIN (IN-HOUSE) - Normal    Narrative:      Troponin T Reference Range:  <= 0.03 ng/mL-   Negative for AMI  >0.03 ng/mL-     Abnormal for myocardial necrosis.  Clinicians would have to utilize clinical acumen, EKG, Troponin and serial changes to determine if it is an Acute Myocardial Infarction or myocardial injury due to an underlying chronic condition.       Results may be falsely decreased if patient taking Biotin.     URINE DRUG SCREEN - Normal    Narrative:     Cutoff For Drugs Screened:    Amphetamines               500 ng/ml  Barbiturates               200 ng/ml  Benzodiazepines            150 ng/ml  Cocaine                    150 ng/ml  Methadone                  200 ng/ml  Opiates                    100 ng/ml  Phencyclidine               25 ng/ml  THC                            50 ng/ml  Methamphetamine            500 ng/ml  Tricyclic Antidepressants  300 ng/ml  Oxycodone                  100 ng/ml  Propoxyphene               300 ng/ml  Buprenorphine               10 ng/ml    The normal value for all drugs tested is negative. This report includes unconfirmed screening results, with the cutoff values listed, to be used for medical treatment purposes only.  Unconfirmed results must not be used for non-medical purposes such as employment or legal testing.  Clinical consideration should be applied to any drug of abuse test, particularly when unconfirmed results are used.     CBC WITH AUTO DIFFERENTIAL - Normal   ETHANOL    Narrative:     Not for legal purposes. Chain of Custody not followed.    CBC AND DIFFERENTIAL    Narrative:     The following orders were created for panel order CBC & Differential.  Procedure                               Abnormality         Status                     ---------                               -----------         ------                     CBC Auto Differential[[id number]]        Normal              Final result                 Please view results for these tests on the individual orders.               MDM  Number of  Diagnoses or Management Options  Abdominal pain, unspecified abdominal location: new and requires workup  Dysphagia, unspecified type: new and requires workup     Amount and/or Complexity of Data Reviewed  Clinical lab tests: reviewed and ordered  Tests in the radiology section of CPT®: reviewed and ordered  Tests in the medicine section of CPT®: reviewed and ordered  Discuss the patient with other providers: yes    Patient Progress  Patient progress: stable      Final diagnoses:   Dysphagia, unspecified type   Abdominal pain, unspecified abdominal location            Yesenia Reyes, APRN  06/09/20 1443

## 2020-06-05 ENCOUNTER — PATIENT MESSAGE (OUTPATIENT)
Dept: FAMILY MEDICINE CLINIC | Facility: CLINIC | Age: 40
End: 2020-06-05

## 2020-06-05 DIAGNOSIS — R13.10 DYSPHAGIA, UNSPECIFIED TYPE: Primary | ICD-10-CM

## 2020-06-05 LAB
BH CV STRESS BP STAGE 1: NORMAL
BH CV STRESS BP STAGE 2: NORMAL
BH CV STRESS BP STAGE 3: NORMAL
BH CV STRESS DURATION MIN STAGE 1: 3
BH CV STRESS DURATION MIN STAGE 2: 3
BH CV STRESS DURATION MIN STAGE 3: 1
BH CV STRESS DURATION SEC STAGE 1: 0
BH CV STRESS DURATION SEC STAGE 2: 0
BH CV STRESS DURATION SEC STAGE 3: 21
BH CV STRESS GRADE STAGE 1: 10
BH CV STRESS GRADE STAGE 2: 12
BH CV STRESS GRADE STAGE 3: 14
BH CV STRESS HR STAGE 1: 109
BH CV STRESS HR STAGE 2: 142
BH CV STRESS HR STAGE 3: 164
BH CV STRESS METS STAGE 1: 5
BH CV STRESS METS STAGE 2: 7.5
BH CV STRESS METS STAGE 3: 10
BH CV STRESS PROTOCOL 1: NORMAL
BH CV STRESS RECOVERY BP: NORMAL MMHG
BH CV STRESS RECOVERY HR: 99 BPM
BH CV STRESS SPEED STAGE 1: 1.7
BH CV STRESS SPEED STAGE 2: 2.5
BH CV STRESS SPEED STAGE 3: 3.4
BH CV STRESS STAGE 1: 1
BH CV STRESS STAGE 2: 2
BH CV STRESS STAGE 3: 3
MAXIMAL PREDICTED HEART RATE: 181 BPM
PERCENT MAX PREDICTED HR: 90.61 %
STRESS BASELINE BP: NORMAL MMHG
STRESS BASELINE HR: 72 BPM
STRESS PERCENT HR: 107 %
STRESS POST ESTIMATED WORKLOAD: 10 METS
STRESS POST EXERCISE DUR MIN: 7 MIN
STRESS POST EXERCISE DUR SEC: 21 SEC
STRESS POST PEAK BP: NORMAL MMHG
STRESS POST PEAK HR: 164 BPM
STRESS TARGET HR: 154 BPM

## 2020-06-06 ENCOUNTER — NURSE TRIAGE (OUTPATIENT)
Dept: CALL CENTER | Facility: HOSPITAL | Age: 40
End: 2020-06-06

## 2020-06-06 ENCOUNTER — PATIENT MESSAGE (OUTPATIENT)
Dept: FAMILY MEDICINE CLINIC | Facility: CLINIC | Age: 40
End: 2020-06-06

## 2020-06-06 ENCOUNTER — HOSPITAL ENCOUNTER (EMERGENCY)
Facility: HOSPITAL | Age: 40
Discharge: HOME | End: 2020-06-07
Attending: INTERNAL MEDICINE | Admitting: INTERNAL MEDICINE

## 2020-06-06 ENCOUNTER — APPOINTMENT (OUTPATIENT)
Dept: GENERAL RADIOLOGY | Facility: HOSPITAL | Age: 40
End: 2020-06-06

## 2020-06-06 DIAGNOSIS — R07.89 ACUTE CHEST WALL PAIN: Primary | ICD-10-CM

## 2020-06-06 LAB
ALBUMIN SERPL-MCNC: 4.2 G/DL (ref 3.5–5.2)
ALBUMIN/GLOB SERPL: 1.1 G/DL
ALP SERPL-CCNC: 133 U/L (ref 39–117)
ALT SERPL W P-5'-P-CCNC: 60 U/L (ref 1–41)
ANION GAP SERPL CALCULATED.3IONS-SCNC: 18 MMOL/L (ref 5–15)
ARTERIAL PATENCY WRIST A: POSITIVE
AST SERPL-CCNC: 33 U/L (ref 1–40)
ATMOSPHERIC PRESS: 749 MMHG
BACTERIA SPEC AEROBE CULT: NORMAL
BASE EXCESS BLDA CALC-SCNC: -0.7 MMOL/L (ref 0–2)
BASOPHILS # BLD AUTO: 0.06 10*3/MM3 (ref 0–0.2)
BASOPHILS NFR BLD AUTO: 0.6 % (ref 0–1.5)
BDY SITE: ABNORMAL
BILIRUB SERPL-MCNC: 0.7 MG/DL (ref 0.2–1.2)
BODY TEMPERATURE: 37 C
BUN BLD-MCNC: 16 MG/DL (ref 6–20)
BUN/CREAT SERPL: 19.8 (ref 7–25)
CALCIUM SPEC-SCNC: 9.5 MG/DL (ref 8.6–10.5)
CHLORIDE SERPL-SCNC: 102 MMOL/L (ref 98–107)
CO2 SERPL-SCNC: 18 MMOL/L (ref 22–29)
CREAT BLD-MCNC: 0.81 MG/DL (ref 0.76–1.27)
D DIMER PPP FEU-MCNC: 0.32 MG/L (FEU) (ref 0–0.5)
DEPRECATED RDW RBC AUTO: 34.2 FL (ref 37–54)
EOSINOPHIL # BLD AUTO: 0.14 10*3/MM3 (ref 0–0.4)
EOSINOPHIL NFR BLD AUTO: 1.4 % (ref 0.3–6.2)
ERYTHROCYTE [DISTWIDTH] IN BLOOD BY AUTOMATED COUNT: 12 % (ref 12.3–15.4)
GFR SERPL CREATININE-BSD FRML MDRD: 106 ML/MIN/1.73
GLOBULIN UR ELPH-MCNC: 3.7 GM/DL
GLUCOSE BLD-MCNC: 105 MG/DL (ref 65–99)
HCO3 BLDA-SCNC: 21.8 MMOL/L (ref 20–26)
HCT VFR BLD AUTO: 43.3 % (ref 37.5–51)
HGB BLD-MCNC: 15.6 G/DL (ref 13–17.7)
HOLD SPECIMEN: NORMAL
HOLD SPECIMEN: NORMAL
IMM GRANULOCYTES # BLD AUTO: 0.02 10*3/MM3 (ref 0–0.05)
IMM GRANULOCYTES NFR BLD AUTO: 0.2 % (ref 0–0.5)
LIPASE SERPL-CCNC: 39 U/L (ref 13–60)
LYMPHOCYTES # BLD AUTO: 2.45 10*3/MM3 (ref 0.7–3.1)
LYMPHOCYTES NFR BLD AUTO: 23.8 % (ref 19.6–45.3)
Lab: ABNORMAL
MCH RBC QN AUTO: 28.4 PG (ref 26.6–33)
MCHC RBC AUTO-ENTMCNC: 36 G/DL (ref 31.5–35.7)
MCV RBC AUTO: 78.7 FL (ref 79–97)
MODALITY: ABNORMAL
MONOCYTES # BLD AUTO: 0.85 10*3/MM3 (ref 0.1–0.9)
MONOCYTES NFR BLD AUTO: 8.3 % (ref 5–12)
NEUTROPHILS # BLD AUTO: 6.76 10*3/MM3 (ref 1.7–7)
NEUTROPHILS NFR BLD AUTO: 65.7 % (ref 42.7–76)
NRBC BLD AUTO-RTO: 0 /100 WBC (ref 0–0.2)
NT-PROBNP SERPL-MCNC: <5 PG/ML (ref 5–450)
PCO2 BLDA: 30.2 MM HG (ref 35–45)
PH BLDA: 7.47 PH UNITS (ref 7.35–7.45)
PLATELET # BLD AUTO: 252 10*3/MM3 (ref 140–450)
PMV BLD AUTO: 10.8 FL (ref 6–12)
PO2 BLDA: 84.1 MM HG (ref 83–108)
POTASSIUM BLD-SCNC: 3.4 MMOL/L (ref 3.5–5.2)
PROT SERPL-MCNC: 7.9 G/DL (ref 6–8.5)
RBC # BLD AUTO: 5.5 10*6/MM3 (ref 4.14–5.8)
SAO2 % BLDCOA: 97.5 % (ref 94–99)
SODIUM BLD-SCNC: 138 MMOL/L (ref 136–145)
TROPONIN T SERPL-MCNC: <0.01 NG/ML (ref 0–0.03)
VENTILATOR MODE: ABNORMAL
WBC NRBC COR # BLD: 10.28 10*3/MM3 (ref 3.4–10.8)
WHOLE BLOOD HOLD SPECIMEN: NORMAL
WHOLE BLOOD HOLD SPECIMEN: NORMAL

## 2020-06-06 PROCEDURE — 36600 WITHDRAWAL OF ARTERIAL BLOOD: CPT

## 2020-06-06 PROCEDURE — 82803 BLOOD GASES ANY COMBINATION: CPT

## 2020-06-06 PROCEDURE — 71045 X-RAY EXAM CHEST 1 VIEW: CPT

## 2020-06-06 PROCEDURE — 93010 ELECTROCARDIOGRAM REPORT: CPT | Performed by: INTERNAL MEDICINE

## 2020-06-06 PROCEDURE — 80053 COMPREHEN METABOLIC PANEL: CPT | Performed by: INTERNAL MEDICINE

## 2020-06-06 PROCEDURE — 83880 ASSAY OF NATRIURETIC PEPTIDE: CPT | Performed by: INTERNAL MEDICINE

## 2020-06-06 PROCEDURE — 83690 ASSAY OF LIPASE: CPT | Performed by: INTERNAL MEDICINE

## 2020-06-06 PROCEDURE — 93005 ELECTROCARDIOGRAM TRACING: CPT | Performed by: EMERGENCY MEDICINE

## 2020-06-06 PROCEDURE — 85025 COMPLETE CBC W/AUTO DIFF WBC: CPT | Performed by: INTERNAL MEDICINE

## 2020-06-06 PROCEDURE — 85379 FIBRIN DEGRADATION QUANT: CPT | Performed by: INTERNAL MEDICINE

## 2020-06-06 PROCEDURE — 84484 ASSAY OF TROPONIN QUANT: CPT | Performed by: INTERNAL MEDICINE

## 2020-06-06 PROCEDURE — 99283 EMERGENCY DEPT VISIT LOW MDM: CPT

## 2020-06-06 PROCEDURE — 36415 COLL VENOUS BLD VENIPUNCTURE: CPT

## 2020-06-07 VITALS
BODY MASS INDEX: 44.1 KG/M2 | RESPIRATION RATE: 13 BRPM | HEART RATE: 68 BPM | SYSTOLIC BLOOD PRESSURE: 147 MMHG | DIASTOLIC BLOOD PRESSURE: 87 MMHG | TEMPERATURE: 98 F | WEIGHT: 315 LBS | HEIGHT: 71 IN | OXYGEN SATURATION: 97 %

## 2020-06-07 LAB — TROPONIN T SERPL-MCNC: <0.01 NG/ML (ref 0–0.03)

## 2020-06-07 PROCEDURE — 84484 ASSAY OF TROPONIN QUANT: CPT | Performed by: INTERNAL MEDICINE

## 2020-06-07 NOTE — TELEPHONE ENCOUNTER
"Reviewed guideline with caller, advises he go to ED for Evaluation and treatment. Caller agrees to follow care advice. ( caller states he had an EKG in 2018 that says Anterior Infarction in the interpretation, he is sure that is what is causing his symptoms.)    Reason for Disposition  • [1] MODERATE difficulty breathing (e.g., speaks in phrases, SOB even at rest, pulse 100-120) AND [2] NEW-onset or WORSE than normal    Additional Information  • Negative: [1] Breathing stopped AND [2] hasn't returned  • Negative: Choking on something  • Negative: Severe difficulty breathing (e.g., struggling for each breath, speaks in single words)  • Negative: Bluish (or gray) lips or face now  • Negative: Difficult to awaken or acting confused (e.g., disoriented, slurred speech)  • Negative: Passed out (i.e., lost consciousness, collapsed and was not responding)  • Negative: Wheezing started suddenly after medicine, an allergic food or bee sting  • Negative: Stridor  • Negative: Slow, shallow and weak breathing  • Negative: Sounds like a life-threatening emergency to the triager  • Negative: Chest pain  • Negative: [1] Wheezing (high pitched whistling sound) AND [2] previous asthma attacks or use of asthma medicines  • Negative: [1] Difficulty breathing AND [2] only present when coughing  • Negative: [1] Difficulty breathing AND [2] only from stuffy or runny nose    Answer Assessment - Initial Assessment Questions  1. RESPIRATORY STATUS: \"Describe your breathing?\" (e.g., wheezing, shortness of breath, unable to speak, severe coughing)       Shortness of breath   2. ONSET: \"When did this breathing problem begin?\"       Last Thursday  3. PATTERN \"Does the difficult breathing come and go, or has it been constant since it started?\"       constant  4. SEVERITY: \"How bad is your breathing?\" (e.g., mild, moderate, severe)     - MILD: No SOB at rest, mild SOB with walking, speaks normally in sentences, can lay down, no retractions, pulse " "< 100.     - MODERATE: SOB at rest, SOB with minimal exertion and prefers to sit, cannot lie down flat, speaks in phrases, mild retractions, audible wheezing, pulse 100-120.     - SEVERE: Very SOB at rest, speaks in single words, struggling to breathe, sitting hunched forward, retractions, pulse > 120       States he feels like he can't take a deep breath  5. RECURRENT SYMPTOM: \"Have you had difficulty breathing before?\" If so, ask: \"When was the last time?\" and \"What happened that time?\"       January,   6. 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 LUNG HISTORY: \"Do you have any history of lung disease?\"  (e.g., pulmonary embolus, asthma, emphysema)      no  8. CAUSE: \"What do you think is causing the breathing problem?\"       unknown  9. OTHER SYMPTOMS: \"Do you have any other symptoms? (e.g., dizziness, runny nose, cough, chest pain, fever)      Disorientation,   10. PREGNANCY: \"Is there any chance you are pregnant?\" \"When was your last menstrual period?\"        na  11. TRAVEL: \"Have you traveled out of the country in the last month?\" (e.g., travel history, exposures)        no    Protocols used: BREATHING DIFFICULTY-ADULT-AH      "

## 2020-06-07 NOTE — ED PROVIDER NOTES
Subjective   Patient is a 39-year-old gentleman who is morbidly obese and has a history of pulmonary hypertension who presents to the hospital with dyspnea along with occasional sharp shooting chest pain which is on the left side of his chest that radiates to the back he states they are brief sharp shooting pains that come and go and happen occasionally.  They have been happening more frequently because of this he is concerned.  He states he is previously had EKGs that show question of septal infarct and is concerned about the possibility having a myocardial infarction.  He denies fevers chills or any recent sick contacts.  He states that really no activity or movement change the discomfort that he has and that the discomfort is extremely brief but recurring.          Review of Systems   Constitutional: Negative for chills and fever.   HENT: Negative for congestion, ear pain and sinus pressure.    Eyes: Negative for photophobia, pain and visual disturbance.   Respiratory: Positive for shortness of breath. Negative for cough, chest tightness and wheezing.    Cardiovascular: Positive for chest pain. Negative for palpitations.   Gastrointestinal: Negative for abdominal pain, diarrhea and nausea.   Endocrine: Negative for cold intolerance and heat intolerance.   Genitourinary: Negative for difficulty urinating and urgency.   Musculoskeletal: Negative for arthralgias, joint swelling and myalgias.   Skin: Negative for color change and wound.   Neurological: Negative for dizziness and headaches.   Hematological: Negative for adenopathy. Does not bruise/bleed easily.   Psychiatric/Behavioral: Negative for agitation, behavioral problems, confusion and decreased concentration.       Past Medical History:   Diagnosis Date   • COPD (chronic obstructive pulmonary disease) (CMS/HCC)    • Encephalitis    • Pancreatitis    • Pulmonary hypertension (CMS/HCC)     patient states he haad a tralee event while giving blood and went into  pulmonary htn       No Known Allergies    Past Surgical History:   Procedure Laterality Date   • CHOLECYSTECTOMY     • SINUS SURGERY         History reviewed. No pertinent family history.    Social History     Socioeconomic History   • Marital status:      Spouse name: Yudelka   • Number of children: 2   • Years of education: 14   • Highest education level: Some college, no degree   Occupational History   • Occupation:    Tobacco Use   • Smoking status: Never Smoker   • Smokeless tobacco: Never Used   Substance and Sexual Activity   • Alcohol use: No   • Drug use: No   • Sexual activity: Yes     Partners: Female     Birth control/protection: Surgical           Objective   Physical Exam   Constitutional: He is oriented to person, place, and time. He appears well-developed and well-nourished.   HENT:   Head: Normocephalic and atraumatic.   Mouth/Throat: Oropharynx is clear and moist.   Eyes: Pupils are equal, round, and reactive to light. EOM are normal.   Neck: Normal range of motion. Neck supple.   Cardiovascular: Normal rate, regular rhythm, normal heart sounds and intact distal pulses.   Pulmonary/Chest: Effort normal and breath sounds normal.   Abdominal: Soft. Bowel sounds are normal. There is no tenderness.   Musculoskeletal: Normal range of motion. He exhibits no edema.   Neurological: He is alert and oriented to person, place, and time. He has normal reflexes. No cranial nerve deficit.   Skin: Skin is warm and dry. No rash noted.   Psychiatric: He has a normal mood and affect. His behavior is normal. Thought content normal.   Nursing note and vitals reviewed.      Procedures           ED Course                                           MDM    Final diagnoses:   Acute chest wall pain            Mariusz Rosas MD  06/07/20 0114

## 2020-06-07 NOTE — DISCHARGE INSTRUCTIONS
Chest Wall Pain  Chest wall pain is pain in or around the bones and muscles of your chest. Chest wall pain may be caused by:  · An injury.  · Coughing a lot.  · Using your chest and arm muscles too much.  Sometimes, the cause may not be known. This pain may take a few weeks or longer to get better.  Follow these instructions at home:  Managing pain, stiffness, and swelling  If told, put ice on the painful area:  · Put ice in a plastic bag.  · Place a towel between your skin and the bag.  · Leave the ice on for 20 minutes, 2-3 times a day.    Activity  · Rest as told by your doctor.  · Avoid doing things that cause pain. This includes lifting heavy items.  · Ask your doctor what activities are safe for you.  General instructions    · Take over-the-counter and prescription medicines only as told by your doctor.  · Do not use any products that contain nicotine or tobacco, such as cigarettes, e-cigarettes, and chewing tobacco. If you need help quitting, ask your doctor.  · Keep all follow-up visits as told by your doctor. This is important.  Contact a doctor if:  · You have a fever.  · Your chest pain gets worse.  · You have new symptoms.  Get help right away if:  · You feel sick to your stomach (nauseous) or you throw up (vomit).  · You feel sweaty or light-headed.  · You have a cough with mucus from your lungs (sputum) or you cough up blood.  · You are short of breath.  These symptoms may be an emergency. Do not wait to see if the symptoms will go away. Get medical help right away. Call your local emergency services (911 in the [place] Do not drive yourself to the hospital.  Summary  · Chest wall pain is pain in or around the bones and muscles of your chest.  · It may be treated with ice, rest, and medicines. Your condition may also get better if you avoid doing things that cause pain.  · Contact a doctor if you have a fever, chest pain that gets worse, or new symptoms.  · Get help right away if you feel light-headed  or you get short of breath. These symptoms may be an emergency.  This information is not intended to replace advice given to you by your health care provider. Make sure you discuss any questions you have with your health care provider.  Document Released: 06/05/2009 Document Revised: 06/20/2019 Document Reviewed: 06/20/2019  Elsevier Patient Education © 2020 [employer]

## 2020-06-08 ENCOUNTER — TELEPHONE (OUTPATIENT)
Dept: FAMILY MEDICINE CLINIC | Facility: CLINIC | Age: 40
End: 2020-06-08

## 2020-06-08 NOTE — TELEPHONE ENCOUNTER
Keep apt this week: we will cover all of this    ----- Message from Elisa Chen LPN sent at 6/8/2020  9:23 AM CDT -----  Regarding: RE: Test Results Question  Contact: [phone]  My chart    ----- Message -----  From: Hernandez Pizarro  Sent: 6/8/2020   9:15 AM CDT  To: [facility]  Subject: RE: Test Results Question                        At 9:07 blood pressure is at 149/82 with 65 bpm heart rate. My stress test came back and it shows nothing abnormal with spetal wall but every ekg test I have had since June 2018 shows there has been a Septal Infarc at some point. The hospitals have never followed up with it so I don't know of i have had a heart attack at b some point and didn't know it or not. The tests are contradicting to each other. The very most recent ekg the doctor read states he rules it out, not sure if that was after reading stress test results. Breathing is still bad and can only find a little relief when laying on right side. Light headedness is still been bad, worse after eating. I have some FMLA forms I need filled out, can I fax them over?  ----- Message -----  From: EDUARDO PICKARD  Sent: 6/8/2020  9:31 AM EDT  To: Hernandez Pizarro  Subject: RE: Test Results Question  Dr Park, is wanting to know how your blood pressure is running this morning?    ----- Message -----     From: Hernandez Pizarro     Sent: 6/6/2020  6:22 PM CDT       To: Frandy Park MD  Subject: Test Results Question    I have been looking back on Ecg test results. I keep noticing a Septal Infarct. If I have had this why has noone ever explained this to me? Is it possible my current situation is due to this?

## 2020-06-09 ENCOUNTER — APPOINTMENT (OUTPATIENT)
Dept: CARDIOLOGY | Facility: HOSPITAL | Age: 40
End: 2020-06-09

## 2020-06-09 ENCOUNTER — HOSPITAL ENCOUNTER (OUTPATIENT)
Dept: ULTRASOUND IMAGING | Facility: HOSPITAL | Age: 40
Discharge: HOME | End: 2020-06-09

## 2020-06-09 ENCOUNTER — HOSPITAL ENCOUNTER (OUTPATIENT)
Dept: ULTRASOUND IMAGING | Facility: HOSPITAL | Age: 40
Discharge: HOME | End: 2020-06-09
Admitting: FAMILY MEDICINE

## 2020-06-09 DIAGNOSIS — E66.9 OBESITY, UNSPECIFIED CLASSIFICATION, UNSPECIFIED OBESITY TYPE, UNSPECIFIED WHETHER SERIOUS COMORBIDITY PRESENT: ICD-10-CM

## 2020-06-09 DIAGNOSIS — K76.0 FATTY LIVER: ICD-10-CM

## 2020-06-09 DIAGNOSIS — R74.8 LIVER ENZYME ELEVATION: ICD-10-CM

## 2020-06-09 DIAGNOSIS — R60.9 EDEMA, UNSPECIFIED TYPE: ICD-10-CM

## 2020-06-09 DIAGNOSIS — R03.0 ELEVATED BLOOD PRESSURE READING: ICD-10-CM

## 2020-06-09 PROCEDURE — 93970 EXTREMITY STUDY: CPT

## 2020-06-09 PROCEDURE — 93970 EXTREMITY STUDY: CPT | Performed by: SURGERY

## 2020-06-09 PROCEDURE — 76705 ECHO EXAM OF ABDOMEN: CPT

## 2020-06-09 PROCEDURE — 76981 USE PARENCHYMA: CPT

## 2020-06-10 ENCOUNTER — TELEPHONE (OUTPATIENT)
Dept: FAMILY MEDICINE CLINIC | Facility: CLINIC | Age: 40
End: 2020-06-10

## 2020-06-10 ENCOUNTER — OFFICE VISIT (OUTPATIENT)
Dept: FAMILY MEDICINE CLINIC | Facility: CLINIC | Age: 40
End: 2020-06-10

## 2020-06-10 VITALS
SYSTOLIC BLOOD PRESSURE: 130 MMHG | OXYGEN SATURATION: 98 % | BODY MASS INDEX: 44.1 KG/M2 | HEIGHT: 71 IN | HEART RATE: 71 BPM | WEIGHT: 315 LBS | RESPIRATION RATE: 18 BRPM | DIASTOLIC BLOOD PRESSURE: 82 MMHG | TEMPERATURE: 97.8 F

## 2020-06-10 DIAGNOSIS — I10 ESSENTIAL HYPERTENSION: ICD-10-CM

## 2020-06-10 DIAGNOSIS — I27.20 PULMONARY HYPERTENSION (HCC): ICD-10-CM

## 2020-06-10 DIAGNOSIS — R73.9 ELEVATED BLOOD SUGAR: ICD-10-CM

## 2020-06-10 DIAGNOSIS — K21.9 GASTROESOPHAGEAL REFLUX DISEASE, ESOPHAGITIS PRESENCE NOT SPECIFIED: ICD-10-CM

## 2020-06-10 DIAGNOSIS — K76.0 FATTY LIVER: ICD-10-CM

## 2020-06-10 DIAGNOSIS — G47.33 OBSTRUCTIVE SLEEP APNEA: ICD-10-CM

## 2020-06-10 DIAGNOSIS — K75.81 NASH (NONALCOHOLIC STEATOHEPATITIS): ICD-10-CM

## 2020-06-10 DIAGNOSIS — R60.9 EDEMA, UNSPECIFIED TYPE: ICD-10-CM

## 2020-06-10 DIAGNOSIS — E66.9 OBESITY, UNSPECIFIED CLASSIFICATION, UNSPECIFIED OBESITY TYPE, UNSPECIFIED WHETHER SERIOUS COMORBIDITY PRESENT: ICD-10-CM

## 2020-06-10 DIAGNOSIS — R07.9 CHEST PAIN, UNSPECIFIED TYPE: ICD-10-CM

## 2020-06-10 DIAGNOSIS — Z00.00 WELLNESS EXAMINATION: Primary | ICD-10-CM

## 2020-06-10 PROCEDURE — 99214 OFFICE O/P EST MOD 30 MIN: CPT | Performed by: FAMILY MEDICINE

## 2020-06-10 RX ORDER — POTASSIUM CHLORIDE 750 MG/1
20 TABLET, FILM COATED, EXTENDED RELEASE ORAL DAILY
Qty: 60 TABLET | Refills: 5 | Status: SHIPPED | OUTPATIENT
Start: 2020-06-10 | End: 2020-12-15 | Stop reason: SDUPTHER

## 2020-06-10 RX ORDER — AMLODIPINE BESYLATE 10 MG/1
10 TABLET ORAL DAILY
Qty: 30 TABLET | Refills: 5 | Status: SHIPPED | OUTPATIENT
Start: 2020-06-10 | End: 2020-12-10 | Stop reason: SDUPTHER

## 2020-06-10 NOTE — PROGRESS NOTES
Subjective   Hernandez Pizarro is a 39 y.o. male presenting with chief complaint of:   Chief Complaint   Patient presents with   • Pulmonary hypertension     1 week follow-up.       History of Present Illness :  Alone.  Here for primarily an acute issue today; f/u SOB, chest pain (atypical), and recent outpatient testing.       Has multiple chronic problems to consider that might have a bearing on today's issues; not an interval appointment.       Chronic/acute problems reviewed today:   1. Fatty liver: 6.9.20; 12m: Chronic/? Now BALLARD.  Previously warned/ware treatment involves normalizing weight; usually including low fat diet and regular exercise.  Aware condition can go on to cirrhosis and death.  Stable occ liver labs and past imaging but now with elastogram; F3 recent testing.        2. Obstructive sleep apnea: compliant: Chronic/stable.  Uses cpap compliantly without significant problems with equipment.  Finds sleeps better and less sleepy during the day.       3. Pulmonary hypertension (CMS/HCC) prior echo.  Risk for.  Recent EST/echo low risk ischemia and normal EF.    4. Chest pain, unspecified type: on/off recently atypical for angina; EST/echo low risk.  Now concerned more/more with heartburn.  No choking.    5. Edema, unspecified type: chronic/variable no recent DVT or PE. Obese adult life.    6. Obesity, unspecified classification, unspecified obesity type, unspecified whether serious comorbidity present: Chronic/stable.  Aware, advised weight loss before.  On/off some success with weight loss but often with weight gain back.      7. BALLARD (nonalcoholic steatohepatitis): gastrography F3 (new technology/first time done)   8. Gastroesophageal reflux disease, esophagitis presence not specified: Chronic/active  Some heartburn, reflux without dysphagia, melena.  Rx not used, likely needed-doing ok.      9. Essential hypertension: bp up last visit; feeling he had HTN and started Norvasc 5.  Called and we  "increased 5 bid.  BP now better.  With lasix leg edema better.      Has an/another acute issue today: none.    The following portions of the patient's history were reviewed and updated as appropriate: allergies, current medications, past family history, past medical history, past social history, past surgical history and problem list.      Current Outpatient Medications:   •  amLODIPine (Norvasc) 5 MG tablet, Take 1 tablet by mouth Daily. (Patient taking differently: Take 5 mg by mouth. Takes 1 tablet two times daily.), Disp: 30 tablet, Rfl: 1  •  furosemide (Lasix) 20 MG tablet, Take 1 tablet by mouth Daily., Disp: 30 tablet, Rfl: 5  •  Potassium 95 MG tablet, Take 2 tablets by mouth Daily., Disp: , Rfl:   •  amLODIPine (Norvasc) 10 MG tablet, Take 1 tablet by mouth Daily., Disp: 30 tablet, Rfl: 5  •  aspirin 81 MG EC tablet, Take 162 mg by mouth Daily As Needed (\"when my blood pressure is coming up\")., Disp: , Rfl:   •  potassium chloride (K-DUR) 10 MEQ CR tablet, Take 2 tablets by mouth Daily., Disp: 60 tablet, Rfl: 5    No problems with medications.  Refills if needed done    No Known Allergies    Review of Systems  GENERAL:  Inactive/slower with limits, speed, stamina for age and SOB since this; prior . Sleep is ok usually; with cpap. No fever now/recent.  SKIN: No rash/skin lesion of concern.  ENDO:  No syncope, near or diaphoretic sweaty spells.  BS Ok past without download noted.  HEENT: No recent head injury; or headache.  No vision change.  No hearing loss.  Ears without pain/drainage.  No sore throat.  No significant nasal/sinus congestion/drainage. No epistaxis.  CHEST: No chest wall tenderness or mass. No significant cough,  without wheeze.  Recent SOB; no hemoptysis.  CV: No exertional chest pain, palpitations, occ/ankle edema.  GI: No dysphagia or heartburn.  No abdominal pain, diarrhea, constipation.  No rectal bleeding, or melena.    :  Voids without dysuria, or incontinence to " completion.  ORTHO: No painful/swollen joints but various on /off sore.  No sore neck or back.  No acute neck or back pain without recent injury.  NEURO: No focal/significant weakness of extremities. No dizziness.   No numbness/paresthesias.   PSYCH: No memory loss.  Mood good usually; admits with this anxious without depressed but/and not suicidal.  Tries to tolerate stress .   Screening:  Mammogram: NA  Bone density: NA  Low dose CT chest: Tobacco-smoker/none  GI: NA  Prostate: NA  Usual lab order  NA    Lab Results:  Results for orders placed or performed during the hospital encounter of 06/06/20   Comprehensive Metabolic Panel   Result Value Ref Range    Glucose 105 (H) 65 - 99 mg/dL    BUN 16 6 - 20 mg/dL    Creatinine 0.81 0.76 - 1.27 mg/dL    Sodium 138 136 - 145 mmol/L    Potassium 3.4 (L) 3.5 - 5.2 mmol/L    Chloride 102 98 - 107 mmol/L    CO2 18.0 (L) 22.0 - 29.0 mmol/L    Calcium 9.5 8.6 - 10.5 mg/dL    Total Protein 7.9 6.0 - 8.5 g/dL    Albumin 4.20 3.50 - 5.20 g/dL    ALT (SGPT) 60 (H) 1 - 41 U/L    AST (SGOT) 33 1 - 40 U/L    Alkaline Phosphatase 133 (H) 39 - 117 U/L    Total Bilirubin 0.7 0.2 - 1.2 mg/dL    eGFR Non African Amer 106 >60 mL/min/1.73    Globulin 3.7 gm/dL    A/G Ratio 1.1 g/dL    BUN/Creatinine Ratio 19.8 7.0 - 25.0    Anion Gap 18.0 (H) 5.0 - 15.0 mmol/L   Lipase   Result Value Ref Range    Lipase 39 13 - 60 U/L   BNP   Result Value Ref Range    proBNP <5.0 (L) 5.0 - 450.0 pg/mL   D-dimer, Quantitative   Result Value Ref Range    D-Dimer, Quantitative 0.32 0.00 - 0.50 mg/L (FEU)   Troponin   Result Value Ref Range    Troponin T <0.010 0.000 - 0.030 ng/mL   Troponin   Result Value Ref Range    Troponin T <0.010 0.000 - 0.030 ng/mL   CBC Auto Differential   Result Value Ref Range    WBC 10.28 3.40 - 10.80 10*3/mm3    RBC 5.50 4.14 - 5.80 10*6/mm3    Hemoglobin 15.6 13.0 - 17.7 g/dL    Hematocrit 43.3 37.5 - 51.0 %    MCV 78.7 (L) 79.0 - 97.0 fL    MCH 28.4 26.6 - 33.0 pg    MCHC 36.0  (H) 31.5 - 35.7 g/dL    RDW 12.0 (L) 12.3 - 15.4 %    RDW-SD 34.2 (L) 37.0 - 54.0 fl    MPV 10.8 6.0 - 12.0 fL    Platelets 252 140 - 450 10*3/mm3    Neutrophil % 65.7 42.7 - 76.0 %    Lymphocyte % 23.8 19.6 - 45.3 %    Monocyte % 8.3 5.0 - 12.0 %    Eosinophil % 1.4 0.3 - 6.2 %    Basophil % 0.6 0.0 - 1.5 %    Immature Grans % 0.2 0.0 - 0.5 %    Neutrophils, Absolute 6.76 1.70 - 7.00 10*3/mm3    Lymphocytes, Absolute 2.45 0.70 - 3.10 10*3/mm3    Monocytes, Absolute 0.85 0.10 - 0.90 10*3/mm3    Eosinophils, Absolute 0.14 0.00 - 0.40 10*3/mm3    Basophils, Absolute 0.06 0.00 - 0.20 10*3/mm3    Immature Grans, Absolute 0.02 0.00 - 0.05 10*3/mm3    nRBC 0.0 0.0 - 0.2 /100 WBC   Blood Gas, Arterial   Result Value Ref Range    Site Left Radial     Mt's Test Positive     pH, Arterial 7.467 (H) 7.350 - 7.450 pH units    pCO2, Arterial 30.2 (L) 35.0 - 45.0 mm Hg    pO2, Arterial 84.1 83.0 - 108.0 mm Hg    HCO3, Arterial 21.8 20.0 - 26.0 mmol/L    Base Excess, Arterial -0.7 (L) 0.0 - 2.0 mmol/L    O2 Saturation, Arterial 97.5 94.0 - 99.0 %    Temperature 37.0 C    Barometric Pressure for Blood Gas 749 mmHg    Modality Room Air     Ventilator Mode NA     Collected by [id number]    Light Blue Top   Result Value Ref Range    Extra Tube hold for add-on    Green Top (Gel)   Result Value Ref Range    Extra Tube Hold for add-ons.    Lavender Top   Result Value Ref Range    Extra Tube hold for add-on    Red Top   Result Value Ref Range    Extra Tube Hold for add-ons.        A1C:No results for input(s): HGBA1C in the last 93007 hours.  PSA:No results for input(s): PSA in the last 77016 hours.  CBC:  Lab Results - Last 18 Months   Lab Units 06/06/20 2034 06/04/20 1833 06/01/20 2116 01/17/20  1658   WBC 10*3/mm3 10.28 8.14 9.14 9.96   HEMOGLOBIN g/dL 15.6 14.4 15.7 14.3   HEMATOCRIT % 43.3 41.9 45.5 41.5   PLATELETS 10*3/mm3 252 236 240 247      BMP/CMP:  Lab Results - Last 18 Months   Lab Units 06/06/20 2034 06/04/20 1833  "06/01/20 2116 01/17/20  1658   SODIUM mmol/L 138 141 140 141   POTASSIUM mmol/L 3.4* 3.8 3.8 4.1   CHLORIDE mmol/L 102 106 104 104   CO2 mmol/L 18.0* 23.0 21.0* 29.0   BUN mg/dL 16 23* 21* 21*   CREATININE mg/dL 0.81 0.97 0.90 0.78   EGFR IF NONAFRICN AM mL/min/1.73 106 86 94 111   CALCIUM mg/dL 9.5 8.5* 9.2 8.8     HEPATIC:  Lab Results - Last 18 Months   Lab Units 06/06/20 2034 06/04/20 1833 06/01/20 2116 01/17/20  1658   ALT (SGPT) U/L 60* 58* 59* 52*   AST (SGOT) U/L 33 32 30 32   ALK PHOS U/L 133* 119* 132* 125*     THYROID:  Lab Results - Last 18 Months   Lab Units 06/01/20 2116   TSH uIU/mL 2.650     5.20.20 BH/venous scan  IMPRESSION:  Impression: There is no evidence of deep venous thrombosis or  superficial thrombophlebitis of right or left lower extremities.    6.2.20 BH/CT angio  IMPRESSION:  1. No pulmonary emboli.  2. No acute intrathoracic abnormality. Lungs are clear. No pathologic  lymphadenopathy.  This report was finalized on 06/02/2020 15:38 by Dr. Minerva Verduzco MD.    6.5.20 BH/EST-echo  EST-low risk ischemia  Echocardiogram Findings     Left Ventricle Left ventricular systolic function is normal. Estimated EF appears to be in the range of 66 - 70%. Septal wall motion is normal.     6.9.20 elastography  IMPRESSION:  1. Echogenic liver suggesting fatty infiltration.  2. Metavir score F3 -- Numerous septa without cirrhosis.  3. Prior cholecystectomy. Common duct measures 1 cm likely representing  reservoir effect.      Objective   /82 (BP Location: Left arm, Patient Position: Sitting, Cuff Size: Adult)   Pulse 71   Temp 97.8 °F (36.6 °C) (Oral)   Resp 18   Ht 180.3 cm (71\")   Wt (!) 147 kg (325 lb)   SpO2 98%   BMI 45.33 kg/m²   Body mass index is 45.33 kg/m².    Physical Exam  GENERAL:  Well nourished/developed in no acute distress.   SKIN: Turgor excellent, without wound, rash, lesion.  HEENT: Normal cephalic without trauma.  Pupils equal round reactive to light. Extraocular " motions full without nystagmus.   External canals nonobstructive nontender without reddness. Tymphatic membranes michelle with marilee structures intact.   Oral cavity without growths, exudates, and moist.  Posterior pharynx without mass, obstruction, redness.  No thyromegaly, mass, tenderness, lymphadenopathy and supple.  CV: Regular rhythm.  No murmur, gallop, edema. Posterior pulses intact.  No carotid bruits.  CHEST: No chest wall tenderness or mass.   LUNGS: Symmetric motion with clear to auscultation.  No dullness to percussion  ABD: Soft, nontender without mass.   ORTHO: Symmetric extremities without swelling/point tenderness.  Full gross range of motion.  NEURO: CN 2-12 grossly intact.  Symmetric facies and UE/LE. 3-4/5 strength throughout. 1/4 x bicep equal reflexes.  Nonfocal use extremities. Speech clear. Intact light touch with monofilament, vibratory sensation with tuning fork; equal toes/distal feet.    PSYCH: Oriented x 3.  Pleasant calm, well kept.  Purposeful/directed conservation with intact short/long gross memory.     Assessment/Plan     1. Fatty liver: 6.9.20; 12m    2. Obstructive sleep apnea: compliant    3. Pulmonary hypertension (CMS/HCC)    4. Chest pain, unspecified type    5. Edema, unspecified type    6. Obesity, unspecified classification, unspecified obesity type, unspecified whether serious comorbidity present    7. BALLARD (nonalcoholic steatohepatitis)    8. Gastroesophageal reflux disease, esophagitis presence not specified    9. Essential hypertension        Rx: reviewed/changes:  New Medications Ordered This Visit   Medications   • potassium chloride (K-DUR) 10 MEQ CR tablet     Sig: Take 2 tablets by mouth Daily.     Dispense:  60 tablet     Refill:  5   • amLODIPine (Norvasc) 10 MG tablet     Sig: Take 1 tablet by mouth Daily.     Dispense:  30 tablet     Refill:  5     LAB/Testing/Referrals: reviewed/orders:   Today: none  No orders of the defined types were placed in this  encounter.    Chronic/recurrent labs above or change to:   Working to establish     Discussions:   F3 elastrogram; worrisome to get worse and would mean cirrhosis;  Data 7-10% weight loss (32.5#) could correct pathologic issues  Offered referral weight loss clinic; he could easily be a candidate for risk/benefit to consider obesity surgery.   See GI tomorrow; they will probably agree/do EGD (CP-noncardiac)    Body mass index is 45.33 kg/m².  Patient's Body mass index is 45.33 kg/m². BMI is above normal parameters. Recommendations include: exercise counseling, nutrition counseling, referral to a weight management program and discussed/offered.      Tobacco use reviewed:    Non-smoker  Hernandez Pizarro  reports that he has never smoked. He has never used smokeless tobacco..      Patient Instructions   Your weight today is too high.  You should consider some degree of weight loss (consistant with your abilities and your other health issues)      The fundamentals for wanting to lose weight are:   1. You need a plan.  Your plan might/should likely not be the same as other people (it's what works for you that works)  2. You need to know how much you want to lose.  For health purposes 10% of your body weight is a good start and often a reasonable amount to lose AND keep off.   3. Your plan will need a start date (after the wedding, etc).    4. I advise a plan for what you want to lose each week and therefore you can come close to knowing how long it will take for you to reach your goal.  1#.week is reasonable and 2# week is safe; faster is not advised.   5. With the above; then you need to decide how many calories you may eat each day.  Using a phone johnie like [employer], or [employer] will calculate that for you.  We will be happy to do this if you need our help.  We do not advise fasting; calories below 1000 jesse a day for anyone.  Stick to your daily calories by controlling sweets, snacks, portion size, and binge  eating.   6. You will want to decide how much exercise you can, want or like to do to make this weight loss happen.  Exercise is always important for a weight loss program.   Be sure however and pick one that will not interere with your other health problems.  We would be happy to give you suggestions.  The most effective exercise is something you enjoy as you need to do this regularily; at least 20 minutes/3 times a week.   7. Consider an accountability aide whether it be the phone johnie, a friend joining you on this endeavor, a membership to a gym, a referral to a weight loss program; whatever works for you (or MyChart below)   8. If told acceptable for you; we recommend decreasing carbohydrate intake to a goal of 100 gm/24 hr AND as little carbohydrate intake as possible after mid-day.  Also no breads and carb/sweets.   9. We offer/suggest twice a week MyChart reporting of your weight loss progress as a way to encourage compliance.     ########################          Follow up: Return for lab;, Dr Park-1m.  Future Appointments   Date Time Provider Department Center   6/11/2020  8:30 AM Zully Watson APRN [facility] None   7/7/2020  8:50 AM LAB [facility] None   7/9/2020 11:45 AM Frandy Park MD [facility] None       Answers for HPI/ROS submitted by the patient on 6/8/2020   Shortness of breath  What is the primary reason for your visit?: Shortness of Breath

## 2020-06-10 NOTE — TELEPHONE ENCOUNTER
Sorry I missed it  Can it be added to recent lab sticks he had at /[facility]   Would he want to get it in AM when he sees GI (at /[facility])

## 2020-06-10 NOTE — PATIENT INSTRUCTIONS
Your weight today is too high.  You should consider some degree of weight loss (consistant with your abilities and your other health issues)      The fundamentals for wanting to lose weight are:   1. You need a plan.  Your plan might/should likely not be the same as other people (it's what works for you that works)  2. You need to know how much you want to lose.  For health purposes 10% of your body weight is a good start and often a reasonable amount to lose AND keep off.   3. Your plan will need a start date (after the wedding, etc).    4. I advise a plan for what you want to lose each week and therefore you can come close to knowing how long it will take for you to reach your goal.  1#.week is reasonable and 2# week is safe; faster is not advised.   5. With the above; then you need to decide how many calories you may eat each day.  Using a phone johnie like [employer], or [employer] will calculate that for you.  We will be happy to do this if you need our help.  We do not advise fasting; calories below 1000 jesse a day for anyone.  Stick to your daily calories by controlling sweets, snacks, portion size, and binge eating.   6. You will want to decide how much exercise you can, want or like to do to make this weight loss happen.  Exercise is always important for a weight loss program.   Be sure however and pick one that will not interere with your other health problems.  We would be happy to give you suggestions.  The most effective exercise is something you enjoy as you need to do this regularily; at least 20 minutes/3 times a week.   7. Consider an accountability aide whether it be the phone johnie, a friend joining you on this endeavor, a membership to a gym, a referral to a weight loss program; whatever works for you (or MyChart below)   8. If told acceptable for you; we recommend decreasing carbohydrate intake to a goal of 100 gm/24 hr AND as little carbohydrate intake as possible after mid-day.  Also no breads  and carb/sweets.   9. We offer/suggest twice a week MyChart reporting of your weight loss progress as a way to encourage compliance.     ########################

## 2020-06-11 ENCOUNTER — LAB (OUTPATIENT)
Dept: LAB | Facility: HOSPITAL | Age: 40
End: 2020-06-11

## 2020-06-11 ENCOUNTER — TELEPHONE (OUTPATIENT)
Dept: FAMILY MEDICINE CLINIC | Facility: CLINIC | Age: 40
End: 2020-06-11

## 2020-06-11 ENCOUNTER — OFFICE VISIT (OUTPATIENT)
Dept: GASTROENTEROLOGY | Facility: CLINIC | Age: 40
End: 2020-06-11

## 2020-06-11 VITALS
TEMPERATURE: 97.6 F | WEIGHT: 315 LBS | HEIGHT: 71 IN | SYSTOLIC BLOOD PRESSURE: 138 MMHG | OXYGEN SATURATION: 99 % | DIASTOLIC BLOOD PRESSURE: 96 MMHG | HEART RATE: 78 BPM | BODY MASS INDEX: 44.1 KG/M2

## 2020-06-11 DIAGNOSIS — R10.10 PAIN OF UPPER ABDOMEN: Primary | ICD-10-CM

## 2020-06-11 DIAGNOSIS — K76.0 FATTY LIVER: ICD-10-CM

## 2020-06-11 DIAGNOSIS — R13.14 PHARYNGOESOPHAGEAL DYSPHAGIA: ICD-10-CM

## 2020-06-11 DIAGNOSIS — R79.89 ABNORMAL LFTS: ICD-10-CM

## 2020-06-11 DIAGNOSIS — R07.9 CHEST PAIN, UNSPECIFIED TYPE: ICD-10-CM

## 2020-06-11 LAB
ALBUMIN SERPL-MCNC: 4 G/DL (ref 3.5–5.2)
ALP SERPL-CCNC: 119 U/L (ref 39–117)
ALPHA1 GLOB MFR UR ELPH: 131 MG/DL (ref 90–200)
ALT SERPL W P-5'-P-CCNC: 78 U/L (ref 1–41)
AST SERPL-CCNC: 46 U/L (ref 1–40)
BILIRUB CONJ SERPL-MCNC: <0.2 MG/DL (ref 0.2–0.3)
BILIRUB INDIRECT SERPL-MCNC: ABNORMAL MG/DL
BILIRUB SERPL-MCNC: 0.4 MG/DL (ref 0.2–1.2)
CERULOPLASMIN SERPL-MCNC: 28 MG/DL (ref 16–31)
FERRITIN SERPL-MCNC: 446 NG/ML (ref 30–400)
HAV IGM SERPL QL IA: NORMAL
HBA1C MFR BLD: 5.61 % (ref 4.8–5.6)
HBV CORE IGM SERPL QL IA: NORMAL
HBV SURFACE AG SERPL QL IA: NORMAL
HCV AB SER DONR QL: NORMAL
HOLD SPECIMEN: NORMAL
IRON 24H UR-MRATE: 96 MCG/DL (ref 59–158)
IRON SATN MFR SERPL: 28 % (ref 20–50)
PROT SERPL-MCNC: 7.6 G/DL (ref 6–8.5)
TIBC SERPL-MCNC: 349 MCG/DL (ref 298–536)
TRANSFERRIN SERPL-MCNC: 234 MG/DL (ref 200–360)

## 2020-06-11 PROCEDURE — 80074 ACUTE HEPATITIS PANEL: CPT | Performed by: NURSE PRACTITIONER

## 2020-06-11 PROCEDURE — 84466 ASSAY OF TRANSFERRIN: CPT | Performed by: NURSE PRACTITIONER

## 2020-06-11 PROCEDURE — 82728 ASSAY OF FERRITIN: CPT | Performed by: NURSE PRACTITIONER

## 2020-06-11 PROCEDURE — 83516 IMMUNOASSAY NONANTIBODY: CPT | Performed by: NURSE PRACTITIONER

## 2020-06-11 PROCEDURE — 82390 ASSAY OF CERULOPLASMIN: CPT | Performed by: NURSE PRACTITIONER

## 2020-06-11 PROCEDURE — 80076 HEPATIC FUNCTION PANEL: CPT | Performed by: NURSE PRACTITIONER

## 2020-06-11 PROCEDURE — 36415 COLL VENOUS BLD VENIPUNCTURE: CPT | Performed by: FAMILY MEDICINE

## 2020-06-11 PROCEDURE — 82103 ALPHA-1-ANTITRYPSIN TOTAL: CPT | Performed by: NURSE PRACTITIONER

## 2020-06-11 PROCEDURE — 83036 HEMOGLOBIN GLYCOSYLATED A1C: CPT | Performed by: FAMILY MEDICINE

## 2020-06-11 PROCEDURE — 99214 OFFICE O/P EST MOD 30 MIN: CPT | Performed by: NURSE PRACTITIONER

## 2020-06-11 PROCEDURE — 83540 ASSAY OF IRON: CPT | Performed by: NURSE PRACTITIONER

## 2020-06-11 PROCEDURE — 86038 ANTINUCLEAR ANTIBODIES: CPT | Performed by: NURSE PRACTITIONER

## 2020-06-11 RX ORDER — OMEPRAZOLE 20 MG/1
20 CAPSULE, DELAYED RELEASE ORAL DAILY
Qty: 30 CAPSULE | Refills: 11 | Status: SHIPPED | OUTPATIENT
Start: 2020-06-11 | End: 2020-06-17 | Stop reason: HOSPADM

## 2020-06-11 NOTE — H&P (VIEW-ONLY)
"[facility] GASTROENTEROLOGY - OFFICE NOTE    6/11/2020    Hernandez Pizarro   1980    Primary Physician: Frandy Park MD    Chief Complaint   Patient presents with   • Difficulty Swallowing   upper abdominal pressure.       HISTORY OF PRESENT ILLNESS:    Hernandez Pizarro is a 39 y.o. male presents  with dysphagia. This was noted \" 2 days ago\".   This has been with solid foods.  He points to the mid upper chest/neck area solids to like are hanging.  No problems with liquids.  No nausea or vomiting.  No fevers or chills.  No unintentional weight loss.      Upper abdominal pain  He has noted pressure across the upper abdomen over the last couple weeks intermittently.  Typically noted after eating.  Occasionally has associated nausea but no vomiting.  No fevers or chills.  No hematemesis.  No reflux symptoms.  No aspirin or NSAIDs.  No sign of lower GI bleeding.         Fatty liver  He states this is a recent diagnosis.  Had ultrasound of the liver with elastography May 20, 2020 noting echogenic liver suggesting fatty infiltration, METAVIR score F3.  Liver function test abnormal as well.  Denies history of viral hepatitis.  Denies blood transfusions or tattoos.  No history of IV drug use.  His mother had history of cirrhosis unsure of etiology.            He had been experiencing left-sided chest pain.  Also noted shortness of breath since January 2020.  No exertional chest pain.  Recent stress echo unremarkable.          CT head/neck 6-4-20 nothing acute.   Ct abdomen/pelvis with contrast 6-4-20 report reviewed.  Ultrasound elastography May 20, 2020, report reviewed.    Past Medical History:   Diagnosis Date   • COPD (chronic obstructive pulmonary disease) (CMS/HCC)    • Encephalitis    • Pancreatitis    • Pulmonary hypertension (CMS/HCC)     patient states he haad a tralee event while giving blood and went into pulmonary htn       Past Surgical History:   Procedure Laterality Date   • CHOLECYSTECTOMY   "   • SINUS SURGERY         Outpatient Medications Marked as Taking for the 6/11/20 encounter (Office Visit) with Zully Watson APRN   Medication Sig Dispense Refill   • amLODIPine (Norvasc) 10 MG tablet Take 1 tablet by mouth Daily. 30 tablet 5   • furosemide (Lasix) 20 MG tablet Take 1 tablet by mouth Daily. 30 tablet 5   • potassium chloride (K-DUR) 10 MEQ CR tablet Take 2 tablets by mouth Daily. 60 tablet 5       No Known Allergies    Social History     Socioeconomic History   • Marital status:      Spouse name: Yudelka   • Number of children: 2   • Years of education: 14   • Highest education level: Some college, no degree   Occupational History   • Occupation:    Tobacco Use   • Smoking status: Never Smoker   • Smokeless tobacco: Never Used   Substance and Sexual Activity   • Alcohol use: No   • Drug use: No   • Sexual activity: Yes     Partners: Female     Birth control/protection: Surgical       Family History   Problem Relation Age of Onset   • Colon cancer Father        Review of Systems   Constitutional: Negative for appetite change, chills, fatigue, fever and unexpected weight change.   HENT: Positive for trouble swallowing. Negative for ear pain, rhinorrhea and sore throat.    Eyes: Negative for visual disturbance.   Respiratory: Positive for shortness of breath. Negative for cough and wheezing.    Cardiovascular: Positive for chest pain and leg swelling. Negative for palpitations.   Gastrointestinal: Negative for abdominal distention, abdominal pain, anal bleeding, blood in stool, constipation, diarrhea, nausea and vomiting.        As mentioned in hpi   Genitourinary: Negative for difficulty urinating and hematuria.   Musculoskeletal: Negative for arthralgias, back pain and neck pain.   Skin: Negative for color change and rash.   Neurological: Negative for dizziness, seizures, syncope, light-headedness and headaches.   Hematological: Negative for adenopathy.   Psychiatric/Behavioral:  "Negative for confusion. The patient is not nervous/anxious.         Vitals:    06/11/20 0802   BP: 138/96   Pulse: 78   Temp: 97.6 °F (36.4 °C)   SpO2: 99%   Weight: (!) 148 kg (327 lb)   Height: 180.3 cm (71\")      Body mass index is 45.61 kg/m².    Physical Exam   Constitutional: He appears well-developed and well-nourished. No distress.   HENT:   Head: Normocephalic and atraumatic.   Eyes: EOM are normal. No scleral icterus.   Neck: Neck supple. No JVD present.   Cardiovascular: Normal rate, regular rhythm and normal heart sounds.   Pulmonary/Chest: Effort normal and breath sounds normal.   Abdominal: Soft. Bowel sounds are normal. He exhibits no distension. There is no tenderness.   Musculoskeletal: Normal range of motion. He exhibits no deformity.   Neurological: He is alert.   Skin: Skin is warm and dry. No rash noted.   Psychiatric: He has a normal mood and affect. His behavior is normal.   Vitals reviewed.      Results for orders placed or performed during the hospital encounter of 06/06/20   Comprehensive Metabolic Panel   Result Value Ref Range    Glucose 105 (H) 65 - 99 mg/dL    BUN 16 6 - 20 mg/dL    Creatinine 0.81 0.76 - 1.27 mg/dL    Sodium 138 136 - 145 mmol/L    Potassium 3.4 (L) 3.5 - 5.2 mmol/L    Chloride 102 98 - 107 mmol/L    CO2 18.0 (L) 22.0 - 29.0 mmol/L    Calcium 9.5 8.6 - 10.5 mg/dL    Total Protein 7.9 6.0 - 8.5 g/dL    Albumin 4.20 3.50 - 5.20 g/dL    ALT (SGPT) 60 (H) 1 - 41 U/L    AST (SGOT) 33 1 - 40 U/L    Alkaline Phosphatase 133 (H) 39 - 117 U/L    Total Bilirubin 0.7 0.2 - 1.2 mg/dL    eGFR Non African Amer 106 >60 mL/min/1.73    Globulin 3.7 gm/dL    A/G Ratio 1.1 g/dL    BUN/Creatinine Ratio 19.8 7.0 - 25.0    Anion Gap 18.0 (H) 5.0 - 15.0 mmol/L   Lipase   Result Value Ref Range    Lipase 39 13 - 60 U/L   BNP   Result Value Ref Range    proBNP <5.0 (L) 5.0 - 450.0 pg/mL   D-dimer, Quantitative   Result Value Ref Range    D-Dimer, Quantitative 0.32 0.00 - 0.50 mg/L (FEU) "   Troponin   Result Value Ref Range    Troponin T <0.010 0.000 - 0.030 ng/mL   Troponin   Result Value Ref Range    Troponin T <0.010 0.000 - 0.030 ng/mL   CBC Auto Differential   Result Value Ref Range    WBC 10.28 3.40 - 10.80 10*3/mm3    RBC 5.50 4.14 - 5.80 10*6/mm3    Hemoglobin 15.6 13.0 - 17.7 g/dL    Hematocrit 43.3 37.5 - 51.0 %    MCV 78.7 (L) 79.0 - 97.0 fL    MCH 28.4 26.6 - 33.0 pg    MCHC 36.0 (H) 31.5 - 35.7 g/dL    RDW 12.0 (L) 12.3 - 15.4 %    RDW-SD 34.2 (L) 37.0 - 54.0 fl    MPV 10.8 6.0 - 12.0 fL    Platelets 252 140 - 450 10*3/mm3    Neutrophil % 65.7 42.7 - 76.0 %    Lymphocyte % 23.8 19.6 - 45.3 %    Monocyte % 8.3 5.0 - 12.0 %    Eosinophil % 1.4 0.3 - 6.2 %    Basophil % 0.6 0.0 - 1.5 %    Immature Grans % 0.2 0.0 - 0.5 %    Neutrophils, Absolute 6.76 1.70 - 7.00 10*3/mm3    Lymphocytes, Absolute 2.45 0.70 - 3.10 10*3/mm3    Monocytes, Absolute 0.85 0.10 - 0.90 10*3/mm3    Eosinophils, Absolute 0.14 0.00 - 0.40 10*3/mm3    Basophils, Absolute 0.06 0.00 - 0.20 10*3/mm3    Immature Grans, Absolute 0.02 0.00 - 0.05 10*3/mm3    nRBC 0.0 0.0 - 0.2 /100 WBC   Blood Gas, Arterial   Result Value Ref Range    Site Left Radial     Mt's Test Positive     pH, Arterial 7.467 (H) 7.350 - 7.450 pH units    pCO2, Arterial 30.2 (L) 35.0 - 45.0 mm Hg    pO2, Arterial 84.1 83.0 - 108.0 mm Hg    HCO3, Arterial 21.8 20.0 - 26.0 mmol/L    Base Excess, Arterial -0.7 (L) 0.0 - 2.0 mmol/L    O2 Saturation, Arterial 97.5 94.0 - 99.0 %    Temperature 37.0 C    Barometric Pressure for Blood Gas 749 mmHg    Modality Room Air     Ventilator Mode NA     Collected by [id number]    Light Blue Top   Result Value Ref Range    Extra Tube hold for add-on    Green Top (Gel)   Result Value Ref Range    Extra Tube Hold for add-ons.    Lavender Top   Result Value Ref Range    Extra Tube hold for add-on    Red Top   Result Value Ref Range    Extra Tube Hold for add-ons.            ASSESSMENT AND PLAN    Assessment/Plan     Hernandez was  seen today for difficulty swallowing.    Diagnoses and all orders for this visit:    Pain of upper abdomen  -     Case Request; Standing  -     Case Request    Pharyngoesophageal dysphagia  -     Case Request; Standing  -     Case Request    Chest pain, unspecified type  -     Case Request; Standing  -     Case Request    Fatty liver    Abnormal LFTs  -     Alpha - 1 - Antitrypsin  -     Anti-Smooth Muscle Antibody Titer  -     Ferritin  -     Ceruloplasmin  -     Hepatic Function Panel  -     Hepatitis Panel, Acute  -     Iron Profile  -     Mitochondrial Antibodies, M2  -     Nuclear Antigen Antibody, IFA  -     Hepatic Function Panel; Future    Other orders  -     Follow Anesthesia Guidelines / Protocol; Future  -     Obtain Informed Consent; Future  -     omeprazole (priLOSEC) 20 MG capsule; Take 1 capsule by mouth Daily.    In regards to upper abdominal pain, differential diagnosis discussed.  I would recommend EGD for further evaluation and he is agreeable.  Would recommend omeprazole once daily.  Recommend ER if worsening symptoms.    In regards to chest pain, dobutamine stress echo recently unremarkable.  Chest pain of unclear etiology.  We will be proceeding with upper endoscopy.    In regards to dysphagia, differential diagnosis discussed.  Would recommend cut food small pieces and chew well.  PPI daily.  Await EGD.      In regards to fatty liver, I discussed how fatty liver can lead to cirrhosis, disability and pre-mature death.  How it also can be a sign of increased risk for cardiovascular disease.  I discussed the importance of getting rid of fat in the liver by controlling lipids and glucose, avoiding etoh helps, and gradual weight loss to ideal body weight is very important.  We discussed the results of the ultrasound elastography.  We discussed this is not a perfect test.  Reviewed recent labs June 2020 with normal platelet, normal albumin, normal bilirubin.  We did discuss the fact that fatty liver  can damage the liver to the point of cirrhosis.  Will order liver serologies to make sure no other reason for liver scarring/abnormal LFTs other than fatty liver.      ESOPHAGOGASTRODUODENOSCOPY WITH ANESTHESIA (N/A)  Risk, benefits, and alternatives of endoscopy were explained in full.  They understand that there is a risk of bleeding, perforation, and infection.  The risk of perforation goes up with esophageal dilation.  Other options to evaluate UGI complaints could involve barium swallow or UGI series, but these would be diagnostic tests only.  Patient was given time to ask questions.  I answered them to their satisfaction and they are agreeable to proceeding       Body mass index is 45.61 kg/m².    Patient's Body mass index is 45.61 kg/m². BMI is above normal parameters. Recommendations include: recommend weight loss, no f/u .       Return in about 2 months (around 8/11/2020).        SCOOTER Nolan    EMR Dragon/transcription disclaimer:  Much of this encounter note is electronic transcription/translation of spoken language to printed text.  The electronic translation of spoken language may be erroneous, or at times, nonsensical words or phrases may be inadvertently transcribed.  Although I have reviewed the note for such errors, some may still exist.

## 2020-06-11 NOTE — PROGRESS NOTES
"[facility] GASTROENTEROLOGY - OFFICE NOTE    6/11/2020    Hernandez Pizarro   1980    Primary Physician: Frandy Park MD    Chief Complaint   Patient presents with   • Difficulty Swallowing   upper abdominal pressure.       HISTORY OF PRESENT ILLNESS:    Hernandez Pizarro is a 39 y.o. male presents  with dysphagia. This was noted \" 2 days ago\".   This has been with solid foods.  He points to the mid upper chest/neck area solids to like are hanging.  No problems with liquids.  No nausea or vomiting.  No fevers or chills.  No unintentional weight loss.      Upper abdominal pain  He has noted pressure across the upper abdomen over the last couple weeks intermittently.  Typically noted after eating.  Occasionally has associated nausea but no vomiting.  No fevers or chills.  No hematemesis.  No reflux symptoms.  No aspirin or NSAIDs.  No sign of lower GI bleeding.         Fatty liver  He states this is a recent diagnosis.  Had ultrasound of the liver with elastography May 20, 2020 noting echogenic liver suggesting fatty infiltration, METAVIR score F3.  Liver function test abnormal as well.  Denies history of viral hepatitis.  Denies blood transfusions or tattoos.  No history of IV drug use.  His mother had history of cirrhosis unsure of etiology.            He had been experiencing left-sided chest pain.  Also noted shortness of breath since January 2020.  No exertional chest pain.  Recent stress echo unremarkable.          CT head/neck 6-4-20 nothing acute.   Ct abdomen/pelvis with contrast 6-4-20 report reviewed.  Ultrasound elastography May 20, 2020, report reviewed.    Past Medical History:   Diagnosis Date   • COPD (chronic obstructive pulmonary disease) (CMS/HCC)    • Encephalitis    • Pancreatitis    • Pulmonary hypertension (CMS/HCC)     patient states he haad a tralee event while giving blood and went into pulmonary htn       Past Surgical History:   Procedure Laterality Date   • CHOLECYSTECTOMY   "   • SINUS SURGERY         Outpatient Medications Marked as Taking for the 6/11/20 encounter (Office Visit) with Zully Watson APRN   Medication Sig Dispense Refill   • amLODIPine (Norvasc) 10 MG tablet Take 1 tablet by mouth Daily. 30 tablet 5   • furosemide (Lasix) 20 MG tablet Take 1 tablet by mouth Daily. 30 tablet 5   • potassium chloride (K-DUR) 10 MEQ CR tablet Take 2 tablets by mouth Daily. 60 tablet 5       No Known Allergies    Social History     Socioeconomic History   • Marital status:      Spouse name: Yudelka   • Number of children: 2   • Years of education: 14   • Highest education level: Some college, no degree   Occupational History   • Occupation:    Tobacco Use   • Smoking status: Never Smoker   • Smokeless tobacco: Never Used   Substance and Sexual Activity   • Alcohol use: No   • Drug use: No   • Sexual activity: Yes     Partners: Female     Birth control/protection: Surgical       Family History   Problem Relation Age of Onset   • Colon cancer Father        Review of Systems   Constitutional: Negative for appetite change, chills, fatigue, fever and unexpected weight change.   HENT: Positive for trouble swallowing. Negative for ear pain, rhinorrhea and sore throat.    Eyes: Negative for visual disturbance.   Respiratory: Positive for shortness of breath. Negative for cough and wheezing.    Cardiovascular: Positive for chest pain and leg swelling. Negative for palpitations.   Gastrointestinal: Negative for abdominal distention, abdominal pain, anal bleeding, blood in stool, constipation, diarrhea, nausea and vomiting.        As mentioned in hpi   Genitourinary: Negative for difficulty urinating and hematuria.   Musculoskeletal: Negative for arthralgias, back pain and neck pain.   Skin: Negative for color change and rash.   Neurological: Negative for dizziness, seizures, syncope, light-headedness and headaches.   Hematological: Negative for adenopathy.   Psychiatric/Behavioral:  "Negative for confusion. The patient is not nervous/anxious.         Vitals:    06/11/20 0802   BP: 138/96   Pulse: 78   Temp: 97.6 °F (36.4 °C)   SpO2: 99%   Weight: (!) 148 kg (327 lb)   Height: 180.3 cm (71\")      Body mass index is 45.61 kg/m².    Physical Exam   Constitutional: He appears well-developed and well-nourished. No distress.   HENT:   Head: Normocephalic and atraumatic.   Eyes: EOM are normal. No scleral icterus.   Neck: Neck supple. No JVD present.   Cardiovascular: Normal rate, regular rhythm and normal heart sounds.   Pulmonary/Chest: Effort normal and breath sounds normal.   Abdominal: Soft. Bowel sounds are normal. He exhibits no distension. There is no tenderness.   Musculoskeletal: Normal range of motion. He exhibits no deformity.   Neurological: He is alert.   Skin: Skin is warm and dry. No rash noted.   Psychiatric: He has a normal mood and affect. His behavior is normal.   Vitals reviewed.      Results for orders placed or performed during the hospital encounter of 06/06/20   Comprehensive Metabolic Panel   Result Value Ref Range    Glucose 105 (H) 65 - 99 mg/dL    BUN 16 6 - 20 mg/dL    Creatinine 0.81 0.76 - 1.27 mg/dL    Sodium 138 136 - 145 mmol/L    Potassium 3.4 (L) 3.5 - 5.2 mmol/L    Chloride 102 98 - 107 mmol/L    CO2 18.0 (L) 22.0 - 29.0 mmol/L    Calcium 9.5 8.6 - 10.5 mg/dL    Total Protein 7.9 6.0 - 8.5 g/dL    Albumin 4.20 3.50 - 5.20 g/dL    ALT (SGPT) 60 (H) 1 - 41 U/L    AST (SGOT) 33 1 - 40 U/L    Alkaline Phosphatase 133 (H) 39 - 117 U/L    Total Bilirubin 0.7 0.2 - 1.2 mg/dL    eGFR Non African Amer 106 >60 mL/min/1.73    Globulin 3.7 gm/dL    A/G Ratio 1.1 g/dL    BUN/Creatinine Ratio 19.8 7.0 - 25.0    Anion Gap 18.0 (H) 5.0 - 15.0 mmol/L   Lipase   Result Value Ref Range    Lipase 39 13 - 60 U/L   BNP   Result Value Ref Range    proBNP <5.0 (L) 5.0 - 450.0 pg/mL   D-dimer, Quantitative   Result Value Ref Range    D-Dimer, Quantitative 0.32 0.00 - 0.50 mg/L (FEU) "   Troponin   Result Value Ref Range    Troponin T <0.010 0.000 - 0.030 ng/mL   Troponin   Result Value Ref Range    Troponin T <0.010 0.000 - 0.030 ng/mL   CBC Auto Differential   Result Value Ref Range    WBC 10.28 3.40 - 10.80 10*3/mm3    RBC 5.50 4.14 - 5.80 10*6/mm3    Hemoglobin 15.6 13.0 - 17.7 g/dL    Hematocrit 43.3 37.5 - 51.0 %    MCV 78.7 (L) 79.0 - 97.0 fL    MCH 28.4 26.6 - 33.0 pg    MCHC 36.0 (H) 31.5 - 35.7 g/dL    RDW 12.0 (L) 12.3 - 15.4 %    RDW-SD 34.2 (L) 37.0 - 54.0 fl    MPV 10.8 6.0 - 12.0 fL    Platelets 252 140 - 450 10*3/mm3    Neutrophil % 65.7 42.7 - 76.0 %    Lymphocyte % 23.8 19.6 - 45.3 %    Monocyte % 8.3 5.0 - 12.0 %    Eosinophil % 1.4 0.3 - 6.2 %    Basophil % 0.6 0.0 - 1.5 %    Immature Grans % 0.2 0.0 - 0.5 %    Neutrophils, Absolute 6.76 1.70 - 7.00 10*3/mm3    Lymphocytes, Absolute 2.45 0.70 - 3.10 10*3/mm3    Monocytes, Absolute 0.85 0.10 - 0.90 10*3/mm3    Eosinophils, Absolute 0.14 0.00 - 0.40 10*3/mm3    Basophils, Absolute 0.06 0.00 - 0.20 10*3/mm3    Immature Grans, Absolute 0.02 0.00 - 0.05 10*3/mm3    nRBC 0.0 0.0 - 0.2 /100 WBC   Blood Gas, Arterial   Result Value Ref Range    Site Left Radial     Mt's Test Positive     pH, Arterial 7.467 (H) 7.350 - 7.450 pH units    pCO2, Arterial 30.2 (L) 35.0 - 45.0 mm Hg    pO2, Arterial 84.1 83.0 - 108.0 mm Hg    HCO3, Arterial 21.8 20.0 - 26.0 mmol/L    Base Excess, Arterial -0.7 (L) 0.0 - 2.0 mmol/L    O2 Saturation, Arterial 97.5 94.0 - 99.0 %    Temperature 37.0 C    Barometric Pressure for Blood Gas 749 mmHg    Modality Room Air     Ventilator Mode NA     Collected by [id number]    Light Blue Top   Result Value Ref Range    Extra Tube hold for add-on    Green Top (Gel)   Result Value Ref Range    Extra Tube Hold for add-ons.    Lavender Top   Result Value Ref Range    Extra Tube hold for add-on    Red Top   Result Value Ref Range    Extra Tube Hold for add-ons.            ASSESSMENT AND PLAN    Assessment/Plan     Hernandez was  seen today for difficulty swallowing.    Diagnoses and all orders for this visit:    Pain of upper abdomen  -     Case Request; Standing  -     Case Request    Pharyngoesophageal dysphagia  -     Case Request; Standing  -     Case Request    Chest pain, unspecified type  -     Case Request; Standing  -     Case Request    Fatty liver    Abnormal LFTs  -     Alpha - 1 - Antitrypsin  -     Anti-Smooth Muscle Antibody Titer  -     Ferritin  -     Ceruloplasmin  -     Hepatic Function Panel  -     Hepatitis Panel, Acute  -     Iron Profile  -     Mitochondrial Antibodies, M2  -     Nuclear Antigen Antibody, IFA  -     Hepatic Function Panel; Future    Other orders  -     Follow Anesthesia Guidelines / Protocol; Future  -     Obtain Informed Consent; Future  -     omeprazole (priLOSEC) 20 MG capsule; Take 1 capsule by mouth Daily.    In regards to upper abdominal pain, differential diagnosis discussed.  I would recommend EGD for further evaluation and he is agreeable.  Would recommend omeprazole once daily.  Recommend ER if worsening symptoms.    In regards to chest pain, dobutamine stress echo recently unremarkable.  Chest pain of unclear etiology.  We will be proceeding with upper endoscopy.    In regards to dysphagia, differential diagnosis discussed.  Would recommend cut food small pieces and chew well.  PPI daily.  Await EGD.      In regards to fatty liver, I discussed how fatty liver can lead to cirrhosis, disability and pre-mature death.  How it also can be a sign of increased risk for cardiovascular disease.  I discussed the importance of getting rid of fat in the liver by controlling lipids and glucose, avoiding etoh helps, and gradual weight loss to ideal body weight is very important.  We discussed the results of the ultrasound elastography.  We discussed this is not a perfect test.  Reviewed recent labs June 2020 with normal platelet, normal albumin, normal bilirubin.  We did discuss the fact that fatty liver  can damage the liver to the point of cirrhosis.  Will order liver serologies to make sure no other reason for liver scarring/abnormal LFTs other than fatty liver.      ESOPHAGOGASTRODUODENOSCOPY WITH ANESTHESIA (N/A)  Risk, benefits, and alternatives of endoscopy were explained in full.  They understand that there is a risk of bleeding, perforation, and infection.  The risk of perforation goes up with esophageal dilation.  Other options to evaluate UGI complaints could involve barium swallow or UGI series, but these would be diagnostic tests only.  Patient was given time to ask questions.  I answered them to their satisfaction and they are agreeable to proceeding       Body mass index is 45.61 kg/m².    Patient's Body mass index is 45.61 kg/m². BMI is above normal parameters. Recommendations include: recommend weight loss, no f/u .       Return in about 2 months (around 8/11/2020).        SCOOTER Nolan    EMR Dragon/transcription disclaimer:  Much of this encounter note is electronic transcription/translation of spoken language to printed text.  The electronic translation of spoken language may be erroneous, or at times, nonsensical words or phrases may be inadvertently transcribed.  Although I have reviewed the note for such errors, some may still exist.

## 2020-06-11 NOTE — TELEPHONE ENCOUNTER
PATIENT REQUESTED HIS [facility] PAPERS BE RE-FAXED BECAUSE THE FAX MACHINE WAS DOWN YESTERDAY.    FAX NUMBER: [phone]

## 2020-06-12 ENCOUNTER — TRANSCRIBE ORDERS (OUTPATIENT)
Dept: ADMINISTRATIVE | Facility: HOSPITAL | Age: 40
End: 2020-06-12

## 2020-06-12 DIAGNOSIS — Z01.818 PRE-OP TESTING: Primary | ICD-10-CM

## 2020-06-12 PROBLEM — R13.14 PHARYNGOESOPHAGEAL DYSPHAGIA: Status: ACTIVE | Noted: 2020-06-12

## 2020-06-12 LAB
ACTIN IGG SERPL-ACNC: 4 UNITS (ref 0–19)
DEPRECATED MITOCHONDRIA M2 IGG SER-ACNC: <20 UNITS (ref 0–20)

## 2020-06-13 LAB — ANA SER QL IA: NEGATIVE

## 2020-06-15 ENCOUNTER — TELEPHONE (OUTPATIENT)
Dept: GASTROENTEROLOGY | Facility: CLINIC | Age: 40
End: 2020-06-15

## 2020-06-15 ENCOUNTER — LAB (OUTPATIENT)
Dept: LAB | Facility: HOSPITAL | Age: 40
End: 2020-06-15

## 2020-06-15 LAB — SARS-COV-2 N GENE RESP QL NAA+PROBE: NOT DETECTED

## 2020-06-15 PROCEDURE — 87635 SARS-COV-2 COVID-19 AMP PRB: CPT | Performed by: INTERNAL MEDICINE

## 2020-06-15 NOTE — TELEPHONE ENCOUNTER
Let patient know liver serologies all ok. Recommend keep f/u ov 8/11 with dr. camacho to discuss further.  Thank you

## 2020-06-17 ENCOUNTER — ANESTHESIA EVENT (OUTPATIENT)
Dept: GASTROENTEROLOGY | Facility: HOSPITAL | Age: 40
End: 2020-06-17

## 2020-06-17 ENCOUNTER — TELEPHONE (OUTPATIENT)
Dept: FAMILY MEDICINE CLINIC | Facility: CLINIC | Age: 40
End: 2020-06-17

## 2020-06-17 ENCOUNTER — ANESTHESIA (OUTPATIENT)
Dept: GASTROENTEROLOGY | Facility: HOSPITAL | Age: 40
End: 2020-06-17

## 2020-06-17 ENCOUNTER — HOSPITAL ENCOUNTER (OUTPATIENT)
Facility: HOSPITAL | Age: 40
Setting detail: HOSPITAL OUTPATIENT SURGERY
Discharge: HOME | End: 2020-06-17
Attending: INTERNAL MEDICINE | Admitting: INTERNAL MEDICINE

## 2020-06-17 VITALS
WEIGHT: 315 LBS | BODY MASS INDEX: 44.1 KG/M2 | HEIGHT: 71 IN | HEART RATE: 65 BPM | DIASTOLIC BLOOD PRESSURE: 80 MMHG | OXYGEN SATURATION: 96 % | SYSTOLIC BLOOD PRESSURE: 128 MMHG | RESPIRATION RATE: 15 BRPM | TEMPERATURE: 98 F

## 2020-06-17 DIAGNOSIS — R07.9 CHEST PAIN, UNSPECIFIED TYPE: ICD-10-CM

## 2020-06-17 DIAGNOSIS — R13.14 PHARYNGOESOPHAGEAL DYSPHAGIA: ICD-10-CM

## 2020-06-17 DIAGNOSIS — R10.10 PAIN OF UPPER ABDOMEN: ICD-10-CM

## 2020-06-17 PROCEDURE — 88305 TISSUE EXAM BY PATHOLOGIST: CPT | Performed by: INTERNAL MEDICINE

## 2020-06-17 PROCEDURE — 25010000002 PROPOFOL 10 MG/ML EMULSION: Performed by: NURSE ANESTHETIST, CERTIFIED REGISTERED

## 2020-06-17 PROCEDURE — 87081 CULTURE SCREEN ONLY: CPT | Performed by: INTERNAL MEDICINE

## 2020-06-17 PROCEDURE — 43239 EGD BIOPSY SINGLE/MULTIPLE: CPT | Performed by: INTERNAL MEDICINE

## 2020-06-17 RX ORDER — PANTOPRAZOLE SODIUM 40 MG/1
40 TABLET, DELAYED RELEASE ORAL DAILY
Qty: 30 TABLET | Refills: 11 | Status: SHIPPED | OUTPATIENT
Start: 2020-06-17 | End: 2020-12-30

## 2020-06-17 RX ORDER — ONDANSETRON 2 MG/ML
4 INJECTION INTRAMUSCULAR; INTRAVENOUS ONCE AS NEEDED
Status: DISCONTINUED | OUTPATIENT
Start: 2020-06-17 | End: 2020-06-17 | Stop reason: HOSPADM

## 2020-06-17 RX ORDER — PROPOFOL 10 MG/ML
VIAL (ML) INTRAVENOUS AS NEEDED
Status: DISCONTINUED | OUTPATIENT
Start: 2020-06-17 | End: 2020-06-17 | Stop reason: SURG

## 2020-06-17 RX ORDER — SODIUM CHLORIDE 0.9 % (FLUSH) 0.9 %
10 SYRINGE (ML) INJECTION AS NEEDED
Status: DISCONTINUED | OUTPATIENT
Start: 2020-06-17 | End: 2020-06-17 | Stop reason: HOSPADM

## 2020-06-17 RX ORDER — LIDOCAINE HYDROCHLORIDE 10 MG/ML
0.5 INJECTION, SOLUTION EPIDURAL; INFILTRATION; INTRACAUDAL; PERINEURAL ONCE AS NEEDED
Status: DISCONTINUED | OUTPATIENT
Start: 2020-06-17 | End: 2020-06-17 | Stop reason: HOSPADM

## 2020-06-17 RX ORDER — SODIUM CHLORIDE 9 MG/ML
500 INJECTION, SOLUTION INTRAVENOUS CONTINUOUS PRN
Status: DISCONTINUED | OUTPATIENT
Start: 2020-06-17 | End: 2020-06-17 | Stop reason: HOSPADM

## 2020-06-17 RX ADMIN — PROPOFOL 50 MG: 10 INJECTION, EMULSION INTRAVENOUS at 12:00

## 2020-06-17 RX ADMIN — PROPOFOL 150 MG: 10 INJECTION, EMULSION INTRAVENOUS at 11:55

## 2020-06-17 RX ADMIN — PROPOFOL 50 MG: 10 INJECTION, EMULSION INTRAVENOUS at 11:58

## 2020-06-17 RX ADMIN — PROPOFOL 50 MG: 10 INJECTION, EMULSION INTRAVENOUS at 12:03

## 2020-06-17 RX ADMIN — PROPOFOL 50 MG: 10 INJECTION, EMULSION INTRAVENOUS at 12:01

## 2020-06-17 RX ADMIN — SODIUM CHLORIDE 500 ML: 9 INJECTION, SOLUTION INTRAVENOUS at 10:00

## 2020-06-17 RX ADMIN — PROPOFOL 50 MG: 10 INJECTION, EMULSION INTRAVENOUS at 11:59

## 2020-06-17 RX ADMIN — PROPOFOL 50 MG: 10 INJECTION, EMULSION INTRAVENOUS at 11:56

## 2020-06-17 RX ADMIN — LIDOCAINE HYDROCHLORIDE 100 MG: 20 INJECTION, SOLUTION INTRAVENOUS at 11:55

## 2020-06-17 NOTE — ANESTHESIA POSTPROCEDURE EVALUATION
"Patient: Hernandez Pizarro    Procedure Summary     Date:  06/17/20 Room / Location:   [facility] /  [facility]    Anesthesia Start:  1150 Anesthesia Stop:  1206    Procedure:  ESOPHAGOGASTRODUODENOSCOPY WITH ANESTHESIA (N/A ) Diagnosis:       Pain of upper abdomen      Pharyngoesophageal dysphagia      Chest pain, unspecified type      (Pain of upper abdomen [R10.10])      (Pharyngoesophageal dysphagia [R13.14])      (Chest pain, unspecified type [R07.9])    Surgeon:  Solo Chirinos MD Provider:  Janis Olson CRNA    Anesthesia Type:  MAC ASA Status:  3          Anesthesia Type: MAC    Vitals  No vitals data found for the desired time range.          Post Anesthesia Care and Evaluation    Patient location during evaluation: PHASE II  Patient participation: complete - patient participated  Level of consciousness: awake and alert  Pain management: adequate  Airway patency: patent  Anesthetic complications: No anesthetic complications    Cardiovascular status: acceptable  Respiratory status: acceptable  Hydration status: acceptable    Comments: Blood pressure 147/91, pulse 65, temperature 98 °F (36.7 °C), temperature source Temporal, resp. rate 20, height 180.3 cm (71\"), weight (!) 149 kg (329 lb), SpO2 99 %.    Pt discharged from PACU based on omar score >8      "

## 2020-06-17 NOTE — TELEPHONE ENCOUNTER
His endoscopy is over.    If his blood pressure is better his chest pains better I am not sure that he wants to wait to his appointment on 7/9/2020 to consider what were going to be about returning to work

## 2020-06-17 NOTE — INTERVAL H&P NOTE
H&P updated. The patient was examined and the following changes are noted:  He tells me is been doing well over the last several days.  His upper abdominal discomfort has improved.  He denies heartburn.

## 2020-06-17 NOTE — ANESTHESIA PREPROCEDURE EVALUATION
Anesthesia Evaluation     Patient summary reviewed   no history of anesthetic complications:  NPO Solid Status: > 8 hours             Airway   Mallampati: II  TM distance: >3 FB  Neck ROM: full  No difficulty expected  Dental          Pulmonary    (+) sleep apnea on CPAP,   (-) COPD, asthma, not a smoker  Cardiovascular   Exercise tolerance: good (4-7 METS)    (+) hypertension,   (-) past MI, CAD, dysrhythmias, cardiac stents    ROS comment: Low risk stress 06/20    Neuro/Psych  (-) seizures, TIA, CVA  GI/Hepatic/Renal/Endo    (+) morbid obesity, GERD,  liver disease (stage 3 fibrosis),   (-) no renal disease, diabetes    Musculoskeletal     Abdominal    Substance History      OB/GYN          Other                        Anesthesia Plan    ASA 3     MAC     intravenous induction     Anesthetic plan, all risks, benefits, and alternatives have been provided, discussed and informed consent has been obtained with: patient.

## 2020-06-18 ENCOUNTER — TELEPHONE (OUTPATIENT)
Dept: FAMILY MEDICINE CLINIC | Facility: CLINIC | Age: 40
End: 2020-06-18

## 2020-06-18 LAB
CYTO UR: NORMAL
LAB AP CASE REPORT: NORMAL
LAB AP CLINICAL INFORMATION: NORMAL
PATH REPORT.FINAL DX SPEC: NORMAL
PATH REPORT.GROSS SPEC: NORMAL
UREASE TISS QL: NEGATIVE

## 2020-06-18 NOTE — TELEPHONE ENCOUNTER
Not all of doug's results back; will be couple days  His Rx should help  Will discuss all of this more with you 7.9.20  Once your bp is ok (hope your home numbers are) and you feel ok (no chest pain, shortness of breath); you should be ready to return to work  Luly from my office will be calling to discuss your return with you more  ===View-only below this line===      ----- Message -----     From: Hernandez Pizarro     Sent: 6/18/2020 10:36 AM CDT       To: Frandy Park MD  Subject: Test Results Question    I was wondering if you all had gotten test results back from Dr. Chirinos? He prescribed me a medication for the Esophogitis to help reduce the inflammation that was found during Endoscopy. I was wondering if there were any other things needing to be tested or if I will be released to come back to work? There was no mention from  pertaining to the liver or a treatment plan to help reverse/heal the current damage.

## 2020-06-24 ENCOUNTER — TELEPHONE (OUTPATIENT)
Dept: FAMILY MEDICINE CLINIC | Facility: CLINIC | Age: 40
End: 2020-06-24

## 2020-06-24 ENCOUNTER — PATIENT MESSAGE (OUTPATIENT)
Dept: FAMILY MEDICINE CLINIC | Facility: CLINIC | Age: 40
End: 2020-06-24

## 2020-06-24 DIAGNOSIS — R60.9 EDEMA, UNSPECIFIED TYPE: Primary | ICD-10-CM

## 2020-06-24 DIAGNOSIS — I10 ESSENTIAL HYPERTENSION: ICD-10-CM

## 2020-06-24 NOTE — TELEPHONE ENCOUNTER
Extra CMP around 7.1.20  6m CBC, CMP, A1c  12m CBC, CMP, A1c, LIPID, TSH      Regarding: Non-Urgent Medical Question  Contact: [phone]  ----- Message from Elisa Chen LPN sent at 6/24/2020  3:00 PM CDT -----  BMP?     ----- Message from Hernandez Pizarro to Frandy Park MD sent at 6/24/2020  3:33 PM -----   I was wondering if i could do the outpatient labs on July 7 at [facility]. I dont know what lab hours are but if they are open in the evening I could go by after work.

## 2020-06-24 NOTE — TELEPHONE ENCOUNTER
"My chart message to patient    \"I sent Dr Park your message and he would like for you to get this lab \"Extra CMP around 7.1.20\" I have placed the order and you will be able to get it done at the hospital lab. Thank you Elisa CLARK\"  "

## 2020-06-24 NOTE — TELEPHONE ENCOUNTER
On last OV you advised pt to return for labs in one month, but not sure what labs you are wanting, as patient is wanting to get them done at [facility] while he is over there?

## 2020-07-06 ENCOUNTER — TELEPHONE (OUTPATIENT)
Dept: GASTROENTEROLOGY | Facility: CLINIC | Age: 40
End: 2020-07-06

## 2020-07-06 ENCOUNTER — TELEPHONE (OUTPATIENT)
Dept: FAMILY MEDICINE CLINIC | Facility: CLINIC | Age: 40
End: 2020-07-06

## 2020-07-06 ENCOUNTER — PATIENT MESSAGE (OUTPATIENT)
Dept: FAMILY MEDICINE CLINIC | Facility: CLINIC | Age: 40
End: 2020-07-06

## 2020-07-06 NOTE — TELEPHONE ENCOUNTER
The symptoms of lightheadedness, shortness of breath and a fever are not consistent with small intestinal bacterial overgrowth.  If he is having fevers and shortness of breath then he needs to talk to his primary care provider about what may be causing those symptoms but I would not attribute that to small intestinal bacterial overgrowth.    From our standpoint intestinal gas can be related to bacterial overgrowth but there are a lot of other things that may be causing intestinal gas.  I just recently got his biopsies back.  And his esophageal biopsies suggest he may have food allergies which could be causing his difficulty swallowing and potentially increase bloating.  At this time I recommend he continue on the therapy we initiated the other day at the time of endoscopy.  He should keep his office visit as scheduled and we can reevaluate then after we see how he is responded to the treatment initiated.

## 2020-07-06 NOTE — TELEPHONE ENCOUNTER
----- Message from Hernandez Pizarro sent at 7/6/2020 11:15 AM CDT -----  Regarding: Prescription Question  Contact: [phone]  I was wondering if i could get a prescription or at home test kit to do a check for SIBO. With all this intestinal gas, especially after eating anything which sometimes comes with lightheadedness, shortness of breath worsens, and sometimes a fever if it is possible it could be SIBO? I get extremely gassy even drinking water.

## 2020-07-06 NOTE — TELEPHONE ENCOUNTER
Better for me if he thinks we can discuss this on f/u 7.9.20 Regarding: Prescription Question  Contact: [phone]  ----- Message from Elisa Chen LPN sent at 7/6/2020 11:37 AM CDT -----  MY CHART message     ----- Message from Hernandez Pizarro to Frandy Park MD sent at 7/6/2020 12:19 PM -----   I was wondering if i could get a prescription or at home test kit to do a check for SIBO. With all this intestinal gas, especially after eating anything which sometimes comes with lightheadedness, shortness of breath worsens, and sometimes a fever if it is possible it could be SIBO? I get extremely gassy even drinking water. Last night I developed a short fever, went up to 100.1 along with extreme shortness breath, dizziness, and bp jumped to 158/94. Everything subsided but the shortness of breath after an hour or so.

## 2020-07-07 ENCOUNTER — LAB (OUTPATIENT)
Dept: LAB | Facility: HOSPITAL | Age: 40
End: 2020-07-07

## 2020-07-07 DIAGNOSIS — R60.9 EDEMA, UNSPECIFIED TYPE: ICD-10-CM

## 2020-07-07 DIAGNOSIS — I10 ESSENTIAL HYPERTENSION: ICD-10-CM

## 2020-07-07 LAB
ALBUMIN SERPL-MCNC: 4.1 G/DL (ref 3.5–5.2)
ALBUMIN/GLOB SERPL: 1.4 G/DL
ALP SERPL-CCNC: 140 U/L (ref 39–117)
ALT SERPL W P-5'-P-CCNC: 107 U/L (ref 1–41)
ANION GAP SERPL CALCULATED.3IONS-SCNC: 9.8 MMOL/L (ref 5–15)
AST SERPL-CCNC: 54 U/L (ref 1–40)
BILIRUB SERPL-MCNC: 0.6 MG/DL (ref 0–1.2)
BUN SERPL-MCNC: 24 MG/DL (ref 6–20)
BUN/CREAT SERPL: 24 (ref 7–25)
CALCIUM SPEC-SCNC: 9.3 MG/DL (ref 8.6–10.5)
CHLORIDE SERPL-SCNC: 103 MMOL/L (ref 98–107)
CO2 SERPL-SCNC: 24.2 MMOL/L (ref 22–29)
CREAT SERPL-MCNC: 1 MG/DL (ref 0.76–1.27)
GFR SERPL CREATININE-BSD FRML MDRD: 83 ML/MIN/1.73
GLOBULIN UR ELPH-MCNC: 3 GM/DL
GLUCOSE SERPL-MCNC: 79 MG/DL (ref 65–99)
POTASSIUM SERPL-SCNC: 4 MMOL/L (ref 3.5–5.2)
PROT SERPL-MCNC: 7.1 G/DL (ref 6–8.5)
SODIUM SERPL-SCNC: 137 MMOL/L (ref 136–145)

## 2020-07-07 PROCEDURE — 80053 COMPREHEN METABOLIC PANEL: CPT | Performed by: FAMILY MEDICINE

## 2020-07-07 PROCEDURE — 36415 COLL VENOUS BLD VENIPUNCTURE: CPT

## 2020-07-09 ENCOUNTER — OFFICE VISIT (OUTPATIENT)
Dept: FAMILY MEDICINE CLINIC | Facility: CLINIC | Age: 40
End: 2020-07-09

## 2020-07-09 VITALS
OXYGEN SATURATION: 98 % | HEIGHT: 71 IN | SYSTOLIC BLOOD PRESSURE: 126 MMHG | TEMPERATURE: 98.2 F | BODY MASS INDEX: 44.1 KG/M2 | RESPIRATION RATE: 16 BRPM | WEIGHT: 315 LBS | HEART RATE: 82 BPM | DIASTOLIC BLOOD PRESSURE: 82 MMHG

## 2020-07-09 DIAGNOSIS — R07.9 CHEST PAIN, UNSPECIFIED TYPE: ICD-10-CM

## 2020-07-09 DIAGNOSIS — I27.20 PULMONARY HYPERTENSION (HCC): ICD-10-CM

## 2020-07-09 DIAGNOSIS — G47.33 OBSTRUCTIVE SLEEP APNEA: ICD-10-CM

## 2020-07-09 DIAGNOSIS — R60.9 EDEMA, UNSPECIFIED TYPE: ICD-10-CM

## 2020-07-09 DIAGNOSIS — K21.9 GASTROESOPHAGEAL REFLUX DISEASE, ESOPHAGITIS PRESENCE NOT SPECIFIED: ICD-10-CM

## 2020-07-09 DIAGNOSIS — F41.9 ANXIETY: ICD-10-CM

## 2020-07-09 DIAGNOSIS — K76.0 FATTY LIVER: ICD-10-CM

## 2020-07-09 DIAGNOSIS — I10 ESSENTIAL HYPERTENSION: ICD-10-CM

## 2020-07-09 DIAGNOSIS — R06.02 SHORTNESS OF BREATH: ICD-10-CM

## 2020-07-09 PROCEDURE — 99214 OFFICE O/P EST MOD 30 MIN: CPT | Performed by: FAMILY MEDICINE

## 2020-07-09 RX ORDER — METHYLDOPA/HYDROCHLOROTHIAZIDE 250MG-15MG
1 TABLET ORAL DAILY
COMMUNITY
End: 2020-12-30

## 2020-07-09 RX ORDER — ALBUTEROL SULFATE 90 UG/1
2 AEROSOL, METERED RESPIRATORY (INHALATION) EVERY 4 HOURS PRN
Qty: 8.5 G | Refills: 2 | Status: SHIPPED | OUTPATIENT
Start: 2020-07-09 | End: 2023-01-24

## 2020-07-09 RX ORDER — MAGNESIUM 200 MG
1 TABLET ORAL 2 TIMES DAILY
COMMUNITY
End: 2020-12-30

## 2020-07-13 NOTE — PROGRESS NOTES
Subjective   Hernandez Pizarro is a 39 y.o. male presenting with chief complaint of:   Chief Complaint   Patient presents with   • Follow-up     1 month       History of Present Illness :  Alone.       Has multiple chronic problems to consider that might have a bearing on today's issues; not an interval appointment.       Chronic/acute problems reviewed today:   1. Pulmonary hypertension (CMS/HCC): chronic/nothing seen on a previous echo.  Recent stress echo was negative for ischemic risk and I wanted an echo and it was not done.   2. Essential hypertension Chronic/stable. Higher past and better now; same home blood pressures.  No significant chest pain, SOB, LE edema, orthopnea, near syncope, dizziness/light headness.   Recent Vitals       6/17/2020 6/17/2020 7/9/2020       BP:  129/74  128/80  126/82     Pulse:  --  --  82     Temp:  --  --  98.2 °F (36.8 °C)     Weight:  --  --  (!) 146 kg (320 lb 12.8 oz)     BMI (Calculated):  --  --  44.8            3. Obstructive sleep apnea: compliant: Chronic/stable.  Uses cpap compliantly without significant problems with equipment.  Finds sleeps better and less sleepy during the day.       4. Edema, unspecified type: chronic/variable.  Recently resolved.  Causes include: weight and maybe PH.      5. Anxiety: Chronic/variable:  On/off anxiety tolerated somewhat.  Rx declined.    6. Chest pain, unspecified type: chronic/recent worse and better with bp down.    7. Shortness of breath: chronic/variable recently worse and exertional.  Neg EST/echo; obese and PH/echo pending. BP now better/leg edema resolved.    8. Gastroesophageal reflux disease, esophagitis presence not specified: Chronic/stable.  Some heartburn, reflux without dysphagia, melena.  Rx used/recommended by Juan with EGD,  needed-doing ok.      9. Fatty liver: 6.9.20; 12m: Chronic/stable.  Aware treatment involves normalizing weight; usually including low fat diet and regular exercise.  Aware condition can go on  to cirrhosis and death.  Stable occ liver labs and past imaging.  Elastography this year F3       Has an/another acute issue today: none.    The following portions of the patient's history were reviewed and updated as appropriate: allergies, current medications, past family history, past medical history, past social history, past surgical history and problem list.      Current Outpatient Medications:   •  amLODIPine (Norvasc) 10 MG tablet, Take 1 tablet by mouth Daily., Disp: 30 tablet, Rfl: 5  •  Choline Bitartrate ER (Choline SR) 300 MG tablet controlled-release, Take 1 tablet by mouth 2 (two) times a day., Disp: , Rfl:   •  furosemide (Lasix) 20 MG tablet, Take 1 tablet by mouth Daily., Disp: 30 tablet, Rfl: 5  •  Magnesium 200 MG tablet, Take 1 tablet by mouth 2 (two) times a day., Disp: , Rfl:   •  Menatetrenone (VITAMIN K2) 100 MCG tablet, Take 1 tablet by mouth Daily., Disp: , Rfl:   •  NON FORMULARY, Take 2 tablets by mouth 2 (two) times a day. Liver Aid, Disp: , Rfl:   •  pantoprazole (PROTONIX) 40 MG EC tablet, Take 1 tablet by mouth Daily., Disp: 30 tablet, Rfl: 11  •  potassium chloride (K-DUR) 10 MEQ CR tablet, Take 2 tablets by mouth Daily., Disp: 60 tablet, Rfl: 5  •  albuterol sulfate HFA (ProAir HFA) 108 (90 Base) MCG/ACT inhaler, Inhale 2 puffs Every 4 (Four) Hours As Needed for Wheezing., Disp: 8.5 g, Rfl: 2    No problems with medications.  Refills if needed done    No Known Allergies    Review of Systems  GENERAL:  Inactive/slower with limits, speed, stamina for age and SOB occ; back to work/ . Sleep is ok usually; with cpap. No fever now/recent.  SKIN: No rash/skin lesion of concern.  ENDO:  No syncope, near or diaphoretic sweaty spells.  BS Ok recent.  HEENT: No recent head injury; or headache.  No vision change.  No hearing loss.  Ears without pain/drainage.  No sore throat.  No significant nasal/sinus congestion/drainage. No epistaxis.  CHEST: No chest wall tenderness or mass. No  significant cough,  without wheeze.  Occ mild SOB; no hemoptysis.  CV: No exertional chest pain, palpitations, occ/ankle edema.  GI: No dysphagia or heartburn.  No abdominal pain, diarrhea, constipation.  No rectal bleeding, or melena.    :  Voids without dysuria, or incontinence to completion.  ORTHO: No painful/swollen joints but various on /off sore.  No sore neck or back.  No acute neck or back pain without recent injury.  NEURO: No focal/significant weakness of extremities. No dizziness.   No numbness/paresthesias.   PSYCH: No memory loss.  Mood good usually; admits with this anxious without depressed but/and not suicidal.  Tries to tolerate stress .   Screening:  Mammogram: NA  Bone density: NA  Low dose CT chest: Tobacco-smoker/none  GI: NA  Prostate: NA  Usual lab order  NA       Lab Results:  Results for orders placed or performed in visit on 07/07/20   Comprehensive Metabolic Panel   Result Value Ref Range    Glucose 79 65 - 99 mg/dL    BUN 24 (H) 6 - 20 mg/dL    Creatinine 1.00 0.76 - 1.27 mg/dL    Sodium 137 136 - 145 mmol/L    Potassium 4.0 3.5 - 5.2 mmol/L    Chloride 103 98 - 107 mmol/L    CO2 24.2 22.0 - 29.0 mmol/L    Calcium 9.3 8.6 - 10.5 mg/dL    Total Protein 7.1 6.0 - 8.5 g/dL    Albumin 4.10 3.50 - 5.20 g/dL    ALT (SGPT) 107 (H) 1 - 41 U/L    AST (SGOT) 54 (H) 1 - 40 U/L    Alkaline Phosphatase 140 (H) 39 - 117 U/L    Total Bilirubin 0.6 0.0 - 1.2 mg/dL    eGFR Non African Amer 83 >60 mL/min/1.73    Globulin 3.0 gm/dL    A/G Ratio 1.4 g/dL    BUN/Creatinine Ratio 24.0 7.0 - 25.0    Anion Gap 9.8 5.0 - 15.0 mmol/L       A1C:  Lab Results - Last 18 Months   Lab Units 06/11/20  0901   HEMOGLOBIN A1C % 5.61*     PSA:No results for input(s): PSA in the last 74438 hours.  CBC:  Lab Results - Last 18 Months   Lab Units 06/11/20  0901 06/06/20 2034 06/04/20  1833 06/01/20 2116 01/17/20  1658   WBC 10*3/mm3  --  10.28 8.14 9.14 9.96   HEMOGLOBIN g/dL  --  15.6 14.4 15.7 14.3   HEMATOCRIT %  --   "43.3 41.9 45.5 41.5   PLATELETS 10*3/mm3  --  252 236 240 247   IRON mcg/dL 96  --   --   --   --       BMP/CMP:  Lab Results - Last 18 Months   Lab Units 07/07/20 0625 06/06/20 2034 06/04/20 1833 06/01/20 2116 01/17/20  1658   SODIUM mmol/L 137 138 141 140 141   POTASSIUM mmol/L 4.0 3.4* 3.8 3.8 4.1   CHLORIDE mmol/L 103 102 106 104 104   CO2 mmol/L 24.2 18.0* 23.0 21.0* 29.0   BUN mg/dL 24* 16 23* 21* 21*   CREATININE mg/dL 1.00 0.81 0.97 0.90 0.78   EGFR IF NONAFRICN AM mL/min/1.73 83 106 86 94 111   CALCIUM mg/dL 9.3 9.5 8.5* 9.2 8.8     HEPATIC:  Lab Results - Last 18 Months   Lab Units 07/07/20 0625 06/11/20 0901 06/06/20 2034 06/04/20 1833 06/01/20 2116 01/17/20  1658   ALT (SGPT) U/L 107* 78* 60* 58* 59* 52*   AST (SGOT) U/L 54* 46* 33 32 30 32   ALK PHOS U/L 140* 119* 133* 119* 132* 125*     THYROID:  Lab Results - Last 18 Months   Lab Units 06/01/20 2116   TSH uIU/mL 2.650       Objective   /82   Pulse 82   Temp 98.2 °F (36.8 °C)   Resp 16   Ht 180.3 cm (71\")   Wt (!) 146 kg (320 lb 12.8 oz)   SpO2 98%   BMI 44.74 kg/m²   Body mass index is 44.74 kg/m².    Recent Vitals       6/17/2020 6/17/2020 7/9/2020       BP:  129/74  128/80  126/82     Pulse:  --  --  82     Temp:  --  --  98.2 °F (36.8 °C)     Weight:  --  --  (!) 146 kg (320 lb 12.8 oz)     BMI (Calculated):  --  --  44.8           Physical Exam  GENERAL:  Well nourished/developed in no acute distress.   SKIN: Turgor excellent, without wound, rash, lesion.  HEENT: Normal cephalic without trauma.  Pupils equal round reactive to light. Extraocular motions full without nystagmus.     Oral cavity without growths, exudates, and moist.  Posterior pharynx without mass, obstruction, redness.  No thyromegaly, mass, tenderness, lymphadenopathy and supple.  CV: Regular rhythm.  No murmur, gallop, edema. Posterior pulses intact.  No carotid bruits.  CHEST: No chest wall tenderness or mass.   LUNGS: Symmetric motion with clear to " auscultation.    ABD: Soft, nontender without mass.   ORTHO: Symmetric extremities without swelling/point tenderness.  Full gross range of motion.  NEURO: CN 2-12 grossly intact.  Symmetric facies and UE/LE. 3-4/5 strength throughout. 1/4 x bicep equal reflexes.  Nonfocal use extremities. Speech clear. Intact light touch with monofilament, vibratory sensation with tuning fork; equal toes/distal feet.    PSYCH: Oriented x 3.  Pleasant calm, well kept.  Purposeful/directed conservation with intact short/long gross memory.     Assessment/Plan     1. Pulmonary hypertension (CMS/HCC)    2. Essential hypertension    3. Obstructive sleep apnea: compliant    4. Edema, unspecified type    5. Anxiety    6. Chest pain, unspecified type    7. Shortness of breath    8. Gastroesophageal reflux disease, esophagitis presence not specified    9. Fatty liver: 6.9.20; 12m        Discussions:   Making overall progress  Maybe some influence reflux; keep with PPI  Trial prn HFA/albuterol  Get echo  Get pulmonary review; PFTs as able with COVID    Rx: reviewed/changes:  New Medications Ordered This Visit   Medications   • albuterol sulfate HFA (ProAir HFA) 108 (90 Base) MCG/ACT inhaler     Sig: Inhale 2 puffs Every 4 (Four) Hours As Needed for Wheezing.     Dispense:  8.5 g     Refill:  2       LAB/Testing/Referrals: reviewed/orders:   Today:   Orders Placed This Encounter   Procedures   • Ambulatory Referral to Pulmonology     Chronic/recurrent labs above or change to:   same      Body mass index is 44.74 kg/m².  Patient's Body mass index is 44.74 kg/m². BMI is above normal parameters. Recommendations include: exercise counseling, nutrition counseling and as before.      Tobacco use reviewed:    Non-smoker  Hernandez Pizarro  reports that he has never smoked. He has never used smokeless tobacco..     There are no Patient Instructions on file for this visit.    Follow up: Return for Dr Park-6m.  Future Appointments   Date Time Provider  Department Center   8/11/2020  3:15 PM Solo Chirinos MD [facility] None   1/14/2021  9:45 AM Frandy Park MD [facility] None       Answers for HPI/ROS submitted by the patient on 7/4/2020   Shortness of breath  What is the primary reason for your visit?: Shortness of Breath

## 2020-07-23 ENCOUNTER — TRANSCRIBE ORDERS (OUTPATIENT)
Dept: ADMINISTRATIVE | Facility: HOSPITAL | Age: 40
End: 2020-07-23

## 2020-07-23 ENCOUNTER — TELEPHONE (OUTPATIENT)
Dept: FAMILY MEDICINE CLINIC | Facility: CLINIC | Age: 40
End: 2020-07-23

## 2020-07-23 DIAGNOSIS — Z01.818 OTHER SPECIFIED PRE-OPERATIVE EXAMINATION: Primary | ICD-10-CM

## 2020-07-23 DIAGNOSIS — I27.20 PULMONARY HYPERTENSION (HCC): Primary | ICD-10-CM

## 2020-07-23 DIAGNOSIS — R06.02 SHORTNESS OF BREATH: ICD-10-CM

## 2020-07-23 NOTE — TELEPHONE ENCOUNTER
Ok full PFTs at [facility]    PULMONOLOGY CANNOT NOT DO PFT'S AT THIS TIME AND ASK THAT YOU ORDER A FULL PFT FOR THIS PATIENT AND THEY WILL SCHEDULE FOR OFFICE VISIT.

## 2020-08-22 ENCOUNTER — APPOINTMENT (OUTPATIENT)
Dept: LAB | Facility: HOSPITAL | Age: 40
End: 2020-08-22

## 2020-08-25 ENCOUNTER — APPOINTMENT (OUTPATIENT)
Dept: PULMONOLOGY | Facility: HOSPITAL | Age: 40
End: 2020-08-25

## 2020-09-29 ENCOUNTER — PATIENT MESSAGE (OUTPATIENT)
Dept: FAMILY MEDICINE CLINIC | Facility: CLINIC | Age: 40
End: 2020-09-29

## 2020-09-29 ENCOUNTER — TELEPHONE (OUTPATIENT)
Dept: FAMILY MEDICINE CLINIC | Facility: CLINIC | Age: 40
End: 2020-09-29

## 2020-09-30 DIAGNOSIS — I27.20 PULMONARY HYPERTENSION (HCC): ICD-10-CM

## 2020-09-30 DIAGNOSIS — I10 ESSENTIAL HYPERTENSION: ICD-10-CM

## 2020-09-30 DIAGNOSIS — R60.9 EDEMA, UNSPECIFIED TYPE: Primary | ICD-10-CM

## 2020-10-07 ENCOUNTER — LAB (OUTPATIENT)
Dept: LAB | Facility: HOSPITAL | Age: 40
End: 2020-10-07

## 2020-10-07 DIAGNOSIS — I27.20 PULMONARY HYPERTENSION (HCC): ICD-10-CM

## 2020-10-07 DIAGNOSIS — R60.9 EDEMA, UNSPECIFIED TYPE: ICD-10-CM

## 2020-10-07 DIAGNOSIS — I10 ESSENTIAL HYPERTENSION: ICD-10-CM

## 2020-10-07 LAB
ANION GAP SERPL CALCULATED.3IONS-SCNC: 7.4 MMOL/L (ref 5–15)
BUN SERPL-MCNC: 18 MG/DL (ref 6–20)
BUN/CREAT SERPL: 19.6 (ref 7–25)
CALCIUM SPEC-SCNC: 9.2 MG/DL (ref 8.6–10.5)
CHLORIDE SERPL-SCNC: 106 MMOL/L (ref 98–107)
CO2 SERPL-SCNC: 29.6 MMOL/L (ref 22–29)
CREAT SERPL-MCNC: 0.92 MG/DL (ref 0.76–1.27)
GFR SERPL CREATININE-BSD FRML MDRD: 91 ML/MIN/1.73
GLUCOSE SERPL-MCNC: 77 MG/DL (ref 65–99)
POTASSIUM SERPL-SCNC: 4.1 MMOL/L (ref 3.5–5.2)
SODIUM SERPL-SCNC: 143 MMOL/L (ref 136–145)

## 2020-10-07 PROCEDURE — 80048 BASIC METABOLIC PNL TOTAL CA: CPT | Performed by: FAMILY MEDICINE

## 2020-10-07 PROCEDURE — 36415 COLL VENOUS BLD VENIPUNCTURE: CPT

## 2020-10-13 DIAGNOSIS — R60.9 EDEMA, UNSPECIFIED TYPE: ICD-10-CM

## 2020-10-13 DIAGNOSIS — E66.9 OBESITY, UNSPECIFIED CLASSIFICATION, UNSPECIFIED OBESITY TYPE, UNSPECIFIED WHETHER SERIOUS COMORBIDITY PRESENT: ICD-10-CM

## 2020-10-13 DIAGNOSIS — K76.0 FATTY LIVER: ICD-10-CM

## 2020-10-13 DIAGNOSIS — R03.0 ELEVATED BLOOD PRESSURE READING: ICD-10-CM

## 2020-10-14 RX ORDER — FUROSEMIDE 20 MG/1
20 TABLET ORAL 2 TIMES DAILY
Qty: 180 TABLET | Refills: 1 | Status: SHIPPED | OUTPATIENT
Start: 2020-10-14 | End: 2021-01-15 | Stop reason: SDUPTHER

## 2020-10-14 RX ORDER — FUROSEMIDE 20 MG/1
20 TABLET ORAL DAILY
Qty: 30 TABLET | Refills: 5 | Status: SHIPPED | OUTPATIENT
Start: 2020-10-14 | End: 2020-10-14

## 2020-10-14 NOTE — TELEPHONE ENCOUNTER
Requested Prescriptions     Pending Prescriptions Disp Refills   • furosemide (Lasix) 20 MG tablet 30 tablet 5     Sig: Take 1 tablet by mouth Daily.

## 2020-10-27 ENCOUNTER — TRANSCRIBE ORDERS (OUTPATIENT)
Dept: ADMINISTRATIVE | Facility: HOSPITAL | Age: 40
End: 2020-10-27

## 2020-10-27 DIAGNOSIS — Z01.818 PREOP TESTING: Primary | ICD-10-CM

## 2020-11-02 ENCOUNTER — LAB (OUTPATIENT)
Dept: LAB | Facility: HOSPITAL | Age: 40
End: 2020-11-02

## 2020-11-02 LAB — SARS-COV-2 N GENE RESP QL NAA+PROBE: NOT DETECTED

## 2020-11-02 PROCEDURE — 87635 SARS-COV-2 COVID-19 AMP PRB: CPT | Performed by: FAMILY MEDICINE

## 2020-11-02 PROCEDURE — C9803 HOPD COVID-19 SPEC COLLECT: HCPCS | Performed by: FAMILY MEDICINE

## 2020-11-04 ENCOUNTER — HOSPITAL ENCOUNTER (OUTPATIENT)
Dept: PULMONOLOGY | Facility: HOSPITAL | Age: 40
Discharge: HOME | End: 2020-11-04
Admitting: FAMILY MEDICINE

## 2020-11-04 DIAGNOSIS — R06.02 SHORTNESS OF BREATH: ICD-10-CM

## 2020-11-04 DIAGNOSIS — I27.20 PULMONARY HYPERTENSION (HCC): ICD-10-CM

## 2020-11-04 LAB
ARTERIAL PATENCY WRIST A: POSITIVE
ATMOSPHERIC PRESS: 757 MMHG
BASE EXCESS BLDA CALC-SCNC: 1.6 MMOL/L (ref 0–2)
BDY SITE: ABNORMAL
BODY TEMPERATURE: 37 C
HCO3 BLDA-SCNC: 26.2 MMOL/L (ref 20–26)
Lab: ABNORMAL
MODALITY: ABNORMAL
PCO2 BLDA: 40.6 MM HG (ref 35–45)
PCO2 TEMP ADJ BLD: 40.6 MM HG (ref 35–45)
PH BLDA: 7.42 PH UNITS (ref 7.35–7.45)
PH, TEMP CORRECTED: 7.42 PH UNITS (ref 7.35–7.45)
PO2 BLDA: 95.5 MM HG (ref 83–108)
PO2 TEMP ADJ BLD: 95.5 MM HG (ref 83–108)
SAO2 % BLDCOA: 98.2 % (ref 94–99)
VENTILATOR MODE: ABNORMAL

## 2020-11-04 PROCEDURE — 94726 PLETHYSMOGRAPHY LUNG VOLUMES: CPT | Performed by: INTERNAL MEDICINE

## 2020-11-04 PROCEDURE — 94729 DIFFUSING CAPACITY: CPT

## 2020-11-04 PROCEDURE — 36600 WITHDRAWAL OF ARTERIAL BLOOD: CPT

## 2020-11-04 PROCEDURE — 94726 PLETHYSMOGRAPHY LUNG VOLUMES: CPT

## 2020-11-04 PROCEDURE — 82803 BLOOD GASES ANY COMBINATION: CPT

## 2020-11-04 PROCEDURE — 94060 EVALUATION OF WHEEZING: CPT

## 2020-11-04 PROCEDURE — 94729 DIFFUSING CAPACITY: CPT | Performed by: INTERNAL MEDICINE

## 2020-11-04 PROCEDURE — 94060 EVALUATION OF WHEEZING: CPT | Performed by: INTERNAL MEDICINE

## 2020-11-04 RX ORDER — ALBUTEROL SULFATE 2.5 MG/3ML
2.5 SOLUTION RESPIRATORY (INHALATION) ONCE
Status: COMPLETED | OUTPATIENT
Start: 2020-11-04 | End: 2020-11-04

## 2020-11-04 RX ADMIN — ALBUTEROL SULFATE 2.5 MG: 2.5 SOLUTION RESPIRATORY (INHALATION) at 16:35

## 2020-11-05 PROBLEM — R06.02 SHORTNESS OF BREATH: Status: ACTIVE | Noted: 2020-11-05

## 2020-11-09 ENCOUNTER — TELEPHONE (OUTPATIENT)
Dept: FAMILY MEDICINE CLINIC | Facility: CLINIC | Age: 40
End: 2020-11-09

## 2020-11-09 NOTE — TELEPHONE ENCOUNTER
Sent to efe;   Answered last week      Regarding: Test Results Question  Contact: [phone]  ----- Message from Julianna Merrill MA sent at 11/9/2020 12:10 PM CST -----       ----- Message from Hernandez Pizarro to Frandy Park MD sent at 11/9/2020 10:47 AM -----   I was wondering what my results for the pulmonary test show as I know there is an issue but noone has filled me in as to what was discovered.

## 2020-12-10 DIAGNOSIS — I10 ESSENTIAL HYPERTENSION: ICD-10-CM

## 2020-12-10 RX ORDER — AMLODIPINE BESYLATE 10 MG/1
10 TABLET ORAL DAILY
Qty: 30 TABLET | Refills: 2 | Status: SHIPPED | OUTPATIENT
Start: 2020-12-10 | End: 2021-03-10 | Stop reason: SDUPTHER

## 2020-12-10 NOTE — TELEPHONE ENCOUNTER
Requested Prescriptions     Pending Prescriptions Disp Refills   • amLODIPine (Norvasc) 10 MG tablet 30 tablet 2     Sig: Take 1 tablet by mouth Daily.

## 2020-12-15 RX ORDER — POTASSIUM CHLORIDE 750 MG/1
20 TABLET, FILM COATED, EXTENDED RELEASE ORAL DAILY
Qty: 60 TABLET | Refills: 5 | Status: SHIPPED | OUTPATIENT
Start: 2020-12-15 | End: 2021-07-08 | Stop reason: SDUPTHER

## 2020-12-15 NOTE — TELEPHONE ENCOUNTER
Requested Prescriptions     Pending Prescriptions Disp Refills   • potassium chloride 10 MEQ CR tablet 60 tablet 5     Sig: Take 2 tablets by mouth Daily.

## 2020-12-30 PROCEDURE — 87635 SARS-COV-2 COVID-19 AMP PRB: CPT | Performed by: NURSE PRACTITIONER

## 2021-01-14 ENCOUNTER — OFFICE VISIT (OUTPATIENT)
Dept: FAMILY MEDICINE CLINIC | Facility: CLINIC | Age: 41
End: 2021-01-14

## 2021-01-14 VITALS — TEMPERATURE: 97.8 F | OXYGEN SATURATION: 98 %

## 2021-01-14 DIAGNOSIS — K76.0 FATTY LIVER: ICD-10-CM

## 2021-01-14 DIAGNOSIS — G47.33 OBSTRUCTIVE SLEEP APNEA: ICD-10-CM

## 2021-01-14 DIAGNOSIS — K21.9 GASTROESOPHAGEAL REFLUX DISEASE, UNSPECIFIED WHETHER ESOPHAGITIS PRESENT: ICD-10-CM

## 2021-01-14 DIAGNOSIS — R06.02 SHORTNESS OF BREATH: ICD-10-CM

## 2021-01-14 DIAGNOSIS — E66.9 OBESITY, UNSPECIFIED CLASSIFICATION, UNSPECIFIED OBESITY TYPE, UNSPECIFIED WHETHER SERIOUS COMORBIDITY PRESENT: ICD-10-CM

## 2021-01-14 DIAGNOSIS — I10 ESSENTIAL HYPERTENSION: ICD-10-CM

## 2021-01-14 DIAGNOSIS — Z86.16 HISTORY OF COVID-19: ICD-10-CM

## 2021-01-14 PROCEDURE — 99213 OFFICE O/P EST LOW 20 MIN: CPT | Performed by: FAMILY MEDICINE

## 2021-01-14 NOTE — PROGRESS NOTES
Subjective   Hernandez Pizarro is a 40 y.o. male presenting with chief complaint of:   Chief Complaint   Patient presents with   • Follow-up     6mos   The use of a video visit has been reviewed with the patient and verbal informed consent has been obtained.    Unable to complete visit using a video connection to the patient. A phone visit was used to complete this visits. Total time of discussion was 15 minutes.      History of Present Illness :  Alone.  Here for primarily an acute issue today; f/u COVID.       Has multiple chronic problems to consider that might have a bearing on today's issues; not an interval appointment.       Chronic/acute problems reviewed today:   1. Shortness of breath chronic stable tolerated mild shortness of breath particular with exertion.  Cardiac pulmonary functions were all unremarkable; felt to be a combination of weight and hypertension   2. Obstructive sleep apnea: compliant Chronic/stable.  Uses cpap compliantly without significant problems with equipment.  Finds sleeps better and less sleepy during the day.       3. Fatty liver: 6.9.20; 12m Chronic/stable.  Aware treatment involves normalizing weight; usually including low fat diet and regular exercise.  Aware condition can go on to cirrhosis and death.  Stable occ liver labs and past imaging.       4. Obesity, unspecified classification, unspecified obesity type, unspecified whether serious comorbidity present Chronic/stable.  Aware, advised weight loss before.  On/off some success with weight loss but often with weight gain back.      5. Essential hypertension Chronic/stable. Stable here past/and home blood pressures.  No significant chest pain, SOB, LE edema, orthopnea, near syncope, dizziness/light headness.   Recent Vitals       7/9/2020 12/30/2020 1/14/2021       BP:  126/82  134/77  --     Pulse:  82  79  --     Temp:  98.2 °F (36.8 °C)  97.9 °F (36.6 °C)  97.8 °F (36.6 °C)     Weight:  (!) 146 kg (320 lb 12.8 oz)  136 kg  (300 lb)  --     BMI (Calculated):  44.8  41.9  --            6. Gastroesophageal reflux disease, unspecified whether esophagitis present Chronic/stable.  Controlled heartburn, reflux without dysphagia, melena.  Rx used, periods not used proven needed with symptoms -currently doing ok.      7. History of COVID-19 he had Covid December 30; other than mild fatigue he thinks he resolved all the symptoms.  He is back to work     Has an/another acute issue today: none.    The following portions of the patient's history were reviewed and updated as appropriate: allergies, current medications, past family history, past medical history, past social history, past surgical history and problem list.      Current Outpatient Medications:   •  albuterol sulfate HFA (ProAir HFA) 108 (90 Base) MCG/ACT inhaler, Inhale 2 puffs Every 4 (Four) Hours As Needed for Wheezing., Disp: 8.5 g, Rfl: 2  •  amLODIPine (Norvasc) 10 MG tablet, Take 1 tablet by mouth Daily., Disp: 30 tablet, Rfl: 2  •  furosemide (Lasix) 20 MG tablet, Take 1 tablet by mouth 2 (Two) Times a Day. (Patient taking differently: Take 20 mg by mouth Daily.), Disp: 180 tablet, Rfl: 1  •  potassium chloride 10 MEQ CR tablet, Take 2 tablets by mouth Daily., Disp: 60 tablet, Rfl: 5    No problems with medications.  Refills if needed done    No Known Allergies    Review of Systems   Constitutional: Positive for fatigue. Negative for activity change, appetite change, chills and fever.   HENT: Negative.    Respiratory: Negative for cough, choking, shortness of breath and wheezing.    Cardiovascular: Negative for chest pain, palpitations and leg swelling.   Gastrointestinal: Negative for abdominal pain, constipation, diarrhea and nausea.   Genitourinary: Negative for difficulty urinating.   Neurological: Negative for weakness and headache.     Lab Results:  Results for orders placed or performed during the hospital encounter of 12/30/20   COVID-19,[facility] IN-HOUSE,Nasal Swab No  [employer] 3-4 HR TAT - Swab, Nasal Cavity    Specimen: Nasal Cavity; Swab   Result Value Ref Range    COVID19 Detected (C) Not Detected - Ref. Range       A1C:  Lab Results - Last 18 Months   Lab Units 06/11/20  0901   HEMOGLOBIN A1C % 5.61*     PSA:No results for input(s): PSA in the last 31657 hours.  CBC:  Lab Results - Last 18 Months   Lab Units 06/11/20  0901 06/06/20 2034 06/04/20 1833 06/01/20 2116 01/17/20  1658   WBC 10*3/mm3  --  10.28 8.14 9.14 9.96   HEMOGLOBIN g/dL  --  15.6 14.4 15.7 14.3   HEMATOCRIT %  --  43.3 41.9 45.5 41.5   PLATELETS 10*3/mm3  --  252 236 240 247   IRON mcg/dL 96  --   --   --   --       BMP/CMP:  Lab Results - Last 18 Months   Lab Units 10/07/20  0649 07/07/20 0625 06/06/20 2034 06/04/20 1833 06/01/20 2116 01/17/20  1658   SODIUM mmol/L 143 137 138 141 140 141   POTASSIUM mmol/L 4.1 4.0 3.4* 3.8 3.8 4.1   CHLORIDE mmol/L 106 103 102 106 104 104   CO2 mmol/L 29.6* 24.2 18.0* 23.0 21.0* 29.0   BUN mg/dL 18 24* 16 23* 21* 21*   CREATININE mg/dL 0.92 1.00 0.81 0.97 0.90 0.78   EGFR IF NONAFRICN AM mL/min/1.73 91 83 106 86 94 111   CALCIUM mg/dL 9.2 9.3 9.5 8.5* 9.2 8.8     HEPATIC:  Lab Results - Last 18 Months   Lab Units 07/07/20 0625 06/11/20 0901 06/06/20 2034 06/04/20 1833 06/01/20 2116 01/17/20  1658   ALT (SGPT) U/L 107* 78* 60* 58* 59* 52*   AST (SGOT) U/L 54* 46* 33 32 30 32   ALK PHOS U/L 140* 119* 133* 119* 132* 125*     THYROID:  Lab Results - Last 18 Months   Lab Units 06/01/20  2116   TSH uIU/mL 2.650       Results for orders placed during the hospital encounter of 06/04/20   Adult Stress Echo W/ Cont or Stress Agent if Necessary Per Protocol    Narrative · Left ventricular systolic function is normal.     LOW RISK FOR ISCHEMIA          Interpretation :  1. Spirometry is normal  2. Following bronchodilator there is no change  3. Mid flow is borderline prebronchodilator and normal postbronchodilator  4. Lung volume measurements show reduction in  expiratory reserve volume, otherwise normal  5. Diffusion capacity is normal  6. Airways resistance is normal, conductance reduced  7. Mild flattening of the inspiratory curve can be seen with variable extrathoracic airflow obstruction.  Clinical correlation suggested.     Objective   Temp 97.8 °F (36.6 °C) (Oral)   SpO2 98%   There is no height or weight on file to calculate BMI.    Physical Exam  None; due to telephone/COVID19  Was alert, oriented x 3, pleasant.     Assessment/Plan     1. Shortness of breath    2. Obstructive sleep apnea: compliant    3. Fatty liver: 6.9.20; 12m    4. Obesity, unspecified classification, unspecified obesity type, unspecified whether serious comorbidity present    5. Essential hypertension    6. Gastroesophageal reflux disease, unspecified whether esophagitis present    7. History of COVID-19        Rx: reviewed/changes:  No orders of the defined types were placed in this encounter.    LAB/Testing/Referrals: reviewed/orders:   Today:   No orders of the defined types were placed in this encounter.    Chronic/recurrent labs above or change to:   same     Discussions:   Lab 3/3021      There are no Patient Instructions on file for this visit.    Follow up: Return for lab;, Dr Park-, 6 m;.  No future appointments.

## 2021-01-15 DIAGNOSIS — K76.0 FATTY LIVER: ICD-10-CM

## 2021-01-15 DIAGNOSIS — R60.9 EDEMA, UNSPECIFIED TYPE: ICD-10-CM

## 2021-01-15 DIAGNOSIS — R03.0 ELEVATED BLOOD PRESSURE READING: ICD-10-CM

## 2021-01-15 DIAGNOSIS — E66.9 OBESITY, UNSPECIFIED CLASSIFICATION, UNSPECIFIED OBESITY TYPE, UNSPECIFIED WHETHER SERIOUS COMORBIDITY PRESENT: ICD-10-CM

## 2021-01-15 RX ORDER — FUROSEMIDE 20 MG/1
20 TABLET ORAL 2 TIMES DAILY
Qty: 180 TABLET | Refills: 1 | Status: SHIPPED | OUTPATIENT
Start: 2021-01-15 | End: 2021-10-11

## 2021-01-29 PROCEDURE — 87070 CULTURE OTHR SPECIMN AEROBIC: CPT | Performed by: FAMILY MEDICINE

## 2021-01-29 PROCEDURE — 87205 SMEAR GRAM STAIN: CPT | Performed by: FAMILY MEDICINE

## 2021-01-29 PROCEDURE — 87186 SC STD MICRODIL/AGAR DIL: CPT | Performed by: FAMILY MEDICINE

## 2021-01-31 ENCOUNTER — TELEPHONE (OUTPATIENT)
Dept: URGENT CARE | Facility: CLINIC | Age: 41
End: 2021-01-31

## 2021-01-31 DIAGNOSIS — L03.90 STREPTOCOCCAL CELLULITIS: Primary | ICD-10-CM

## 2021-01-31 DIAGNOSIS — B95.5 STREPTOCOCCAL CELLULITIS: Primary | ICD-10-CM

## 2021-01-31 RX ORDER — AMOXICILLIN AND CLAVULANATE POTASSIUM 875; 125 MG/1; MG/1
1 TABLET, FILM COATED ORAL EVERY 12 HOURS SCHEDULED
Qty: 20 TABLET | Refills: 0 | Status: SHIPPED | OUTPATIENT
Start: 2021-01-31 | End: 2021-01-31

## 2021-01-31 RX ORDER — AMOXICILLIN AND CLAVULANATE POTASSIUM 875; 125 MG/1; MG/1
1 TABLET, FILM COATED ORAL EVERY 12 HOURS SCHEDULED
Qty: 20 TABLET | Refills: 0 | Status: SHIPPED | OUTPATIENT
Start: 2021-01-31 | End: 2021-06-23

## 2021-01-31 NOTE — TELEPHONE ENCOUNTER
Pt called requesting med be sent to [facility] instead as [facility] is closed today.     augmentin switched to [facility].

## 2021-03-10 DIAGNOSIS — I10 ESSENTIAL HYPERTENSION: ICD-10-CM

## 2021-03-11 RX ORDER — AMLODIPINE BESYLATE 10 MG/1
10 TABLET ORAL DAILY
Qty: 30 TABLET | Refills: 5 | Status: SHIPPED | OUTPATIENT
Start: 2021-03-11 | End: 2021-09-06 | Stop reason: SDUPTHER

## 2021-03-11 NOTE — TELEPHONE ENCOUNTER
Requested Prescriptions     Pending Prescriptions Disp Refills   • amLODIPine (Norvasc) 10 MG tablet 30 tablet 5     Sig: Take 1 tablet by mouth Daily.

## 2021-06-14 NOTE — PROGRESS NOTES
[facility] - PODIATRY    Today's Date: 06/23/21    Patient Name: Hernandez Pizarro  MRN: [id number]  CSN: [id number]  PCP: Frandy Park MD  Referring Provider: Referring, Self    SUBJECTIVE     Chief Complaint   Patient presents with   • Establish Care     pcp01/14/2021 Plantar Fasc. vs Heel Spur; Xavier- pt states has been going on for a year, constant throbbing in left foot- pt pain 8 to 9/10- pt presents discloration of toes on both feet, caluses on both feet inside big toes, swelling present.     HPI: Hernandez Pizarro, a 40 y.o.male, comes to clinic as a(n) new patient complaining of foot pain. Patient has h/o COPD, HTN, Encephalitis. Liver Fibrosis, Sleep Apnea. States that for roughly the past year he has had pain in his left foot/heel. Denies injury or trauma. Has pain with the first step of the morning and after periods of rest. The pain is inhibiting his daily activities. Admits pain at 8-9/10 level and described as shooting, aching, throbbing and sharp. Denies previous treatment. Denies any constitutional symptoms. No other pedal complaints at this time.    Past Medical History:   Diagnosis Date   • COPD (chronic obstructive pulmonary disease) (CMS/HCC)    • Encephalitis    • Hypertension    • Liver fibrosis     stage 3   • Pancreatitis    • Pulmonary hypertension (CMS/HCC)     patient states he haad a tralee event while giving blood and went into pulmonary htn   • Sleep apnea     cpap     Past Surgical History:   Procedure Laterality Date   • CHOLECYSTECTOMY     • ENDOSCOPY N/A 6/17/2020    Procedure: ESOPHAGOGASTRODUODENOSCOPY WITH ANESTHESIA;  Surgeon: Solo Chirinos MD;  Location: [facility] ENDOSCOPY;  Service: Gastroenterology;  Laterality: N/A;  preop; acid reflux; abdominal pain; difficulty swallowing  postop  Frandy Park MD   • SINUS SURGERY       Family History   Problem Relation Age of Onset   • Colon cancer Father      Social History     Socioeconomic History   •  Marital status:      Spouse name: Yudelka   • Number of children: 2   • Years of education: 14   • Highest education level: Some college, no degree   Tobacco Use   • Smoking status: Never Smoker   • Smokeless tobacco: Never Used   Vaping Use   • Vaping Use: Never used   Substance and Sexual Activity   • Alcohol use: No   • Drug use: No   • Sexual activity: Yes     Partners: Female     Birth control/protection: Surgical     No Known Allergies  Current Outpatient Medications   Medication Sig Dispense Refill   • albuterol sulfate HFA (ProAir HFA) 108 (90 Base) MCG/ACT inhaler Inhale 2 puffs Every 4 (Four) Hours As Needed for Wheezing. 8.5 g 2   • amLODIPine (Norvasc) 10 MG tablet Take 1 tablet by mouth Daily. 30 tablet 5   • furosemide (Lasix) 20 MG tablet Take 1 tablet by mouth 2 (Two) Times a Day. 180 tablet 1   • potassium chloride 10 MEQ CR tablet Take 2 tablets by mouth Daily. 60 tablet 5     No current facility-administered medications for this visit.     Review of Systems   Constitutional: Negative for chills and fever.   HENT: Negative for congestion.    Respiratory: Negative for shortness of breath.    Cardiovascular: Positive for leg swelling. Negative for chest pain.   Gastrointestinal: Negative for constipation, diarrhea, nausea and vomiting.   Musculoskeletal:        Foot pain   Skin: Negative for wound.   Neurological: Negative for numbness.       OBJECTIVE     Vitals:    21 1050   BP: 142/70   Pulse: 73   SpO2: 98%       PHYSICAL EXAM  GEN:   Accompanied by none.     Foot/Ankle Exam:       General:   Appearance: appears stated age and healthy and obesity    Orientation: AAOx3    Affect: appropriate    Gait: unimpaired    Assistance: independent    Shoe Gear:  Casual shoes    VASCULAR      Right Foot Vascularity   Dorsalis pedis:  2+  Posterior tibial:  2+  Skin Temperature: warm    Edema Gradin+ and pitting  CFT:  3  Pedal Hair Growth:  Present  Varicosities: mild varicosities        Left Foot Vascularity   Dorsalis pedis:  2+  Posterior tibial:  2+  Skin Temperature: warm    Edema Gradin+ and pitting  CFT:  3  Pedal Hair Growth:  Present  Varicosities: mild varicosities        NEUROLOGIC     Right Foot Neurologic   Normal sensation    Light touch sensation:  Normal  Vibratory sensation:  Normal  Hot/Cold sensation: normal    Protective Sensation using Laura-Shaye Monofilament:  10     Left Foot Neurologic   Normal sensation    Light touch sensation:  Normal  Vibratory sensation:  Normal  Hot/cold sensation: normal    Protective Sensation using Laura-Shaye Monofilament:  10     MUSCULOSKELETAL      Right Foot Musculoskeletal   Ecchymosis:  None  Tenderness: none    Arch:  Pes planus     Left Foot Musculoskeletal   Ecchymosis:  None  Tenderness: plantar fascia, plantar heel and arch    Arch:  Pes planus     MUSCLE STRENGTH     Right Foot Muscle Strength   Foot dorsiflexion:  5  Foot plantar flexion:  5  Foot inversion:  5  Foot eversion:  5     Left Foot Muscle Strength   Foot dorsiflexion:  5  Foot plantar flexion:  5  Foot inversion:  5  Foot eversion:  5     RANGE OF MOTION      Right Foot Range of Motion   Ankle dorsiflexion:  0     Left Foot Range of Motion   Ankle dorsiflexion:  0     DERMATOLOGIC     Right Foot Dermatologic   Skin: skin intact       Left Foot Dermatologic   Skin: skin intact       Image:       RADIOLOGY/NUCLEAR:  No results found.    LABORATORY/CULTURE RESULTS:      PATHOLOGY RESULTS:       ASSESSMENT/PLAN     Diagnoses and all orders for this visit:    1. Foot pain, left (Primary)  -     XR Foot 3+ View Bilateral; Future    2. Plantar fasciitis, left  -     Injection Tendon or Ligament  -     bupivacaine (MARCAINE) 0.5 % injection 1.5 mL  -     dexamethasone (DECADRON) injection 10 mg  -     triamcinolone acetonide (KENALOG-40) injection 20 mg      Comprehensive lower extremity examination and evaluation was performed.  Discussed findings and treatment plan  including risks, benefits, and treatment options with patient in detail. Patient agreed with treatment plan.  After written consent obtained, plantar fascial injection performed as documented in procedure note. Post-procedure instructions given.  Conservative therapy including daily stretching (demonstrated proper way of performing), icing 3x daily, decreased activity, and nsaids PRN.   Purchase and utilize Powerstep Inserts.   Xrays to evaluate foot structure.  An After Visit Summary was printed and given to the patient at discharge, including (if requested) any available informative/educational handouts regarding diagnosis, treatment, or medications. All questions were answered to patient/family satisfaction. Should symptoms fail to improve or worsen they agree to call or return to clinic or to go to the Emergency Department. Discussed the importance of following up with any needed screening tests/labs/specialist appointments and any requested follow-up recommended by me today. Importance of maintaining follow-up discussed and patient accepts that missed appointments can delay diagnosis and potentially lead to worsening of conditions.  Return in about 1 month (around 7/23/2021)., or sooner if acute issues arise.    Injection Tendon or Ligament    Date/Time: 6/23/2021 11:45 AM  Performed by: Jhonny Staton DPM  Authorized by: Jhonny Staton DPM   Preparation: Patient was prepped and draped in the usual sterile fashion.  Local anesthesia used: yes  Anesthesia: local infiltration    Anesthesia:  Local anesthesia used: yes  Local Anesthetic: bupivacaine 0.5% without epinephrine  Anesthetic total: 1.5 mL    Sedation:  Patient sedated: no    Patient tolerance: patient tolerated the procedure well with no immediate complications  Comments: 1cc Dexamethasone, 0.5cc Kenalog 40            Lab Frequency Next Occurrence   Adult Transthoracic Echo Complete W/ Cont if Necessary Per Protocol Once 05/27/2020   Follow  Anesthesia Guidelines / Protocol Once 06/11/2020   Obtain Informed Consent Once 06/16/2020   Diet: Once 06/17/2020   COVID PRE-OP / PRE-PROCEDURE SCREENING ORDER (NO ISOLATION) - Swab, Oropharynx Once 07/23/2020       This document has been electronically signed by Jhonny Staton DPM on June 23, 2021 11:43 CDT

## 2021-06-22 ENCOUNTER — TELEPHONE (OUTPATIENT)
Dept: PODIATRY | Facility: CLINIC | Age: 41
End: 2021-06-22

## 2021-06-23 ENCOUNTER — HOSPITAL ENCOUNTER (OUTPATIENT)
Dept: GENERAL RADIOLOGY | Facility: HOSPITAL | Age: 41
Discharge: HOME | End: 2021-06-23
Admitting: PODIATRIST

## 2021-06-23 ENCOUNTER — OFFICE VISIT (OUTPATIENT)
Dept: PODIATRY | Facility: CLINIC | Age: 41
End: 2021-06-23

## 2021-06-23 VITALS
WEIGHT: 315 LBS | BODY MASS INDEX: 44.1 KG/M2 | SYSTOLIC BLOOD PRESSURE: 142 MMHG | OXYGEN SATURATION: 98 % | HEART RATE: 73 BPM | DIASTOLIC BLOOD PRESSURE: 70 MMHG | HEIGHT: 71 IN

## 2021-06-23 DIAGNOSIS — M79.672 FOOT PAIN, LEFT: Primary | ICD-10-CM

## 2021-06-23 DIAGNOSIS — M79.672 FOOT PAIN, LEFT: ICD-10-CM

## 2021-06-23 DIAGNOSIS — M72.2 PLANTAR FASCIITIS, LEFT: ICD-10-CM

## 2021-06-23 PROCEDURE — 99203 OFFICE O/P NEW LOW 30 MIN: CPT | Performed by: PODIATRIST

## 2021-06-23 PROCEDURE — 73630 X-RAY EXAM OF FOOT: CPT

## 2021-06-23 PROCEDURE — 20550 NJX 1 TENDON SHEATH/LIGAMENT: CPT | Performed by: PODIATRIST

## 2021-06-23 RX ORDER — BUPIVACAINE HYDROCHLORIDE 5 MG/ML
1.5 INJECTION, SOLUTION PERINEURAL ONCE
Status: COMPLETED | OUTPATIENT
Start: 2021-06-23 | End: 2021-06-23

## 2021-06-23 RX ORDER — TRIAMCINOLONE ACETONIDE 40 MG/ML
20 INJECTION, SUSPENSION INTRA-ARTICULAR; INTRAMUSCULAR ONCE
Status: COMPLETED | OUTPATIENT
Start: 2021-06-23 | End: 2021-06-23

## 2021-06-23 RX ORDER — DEXAMETHASONE SODIUM PHOSPHATE 10 MG/ML
10 INJECTION INTRAMUSCULAR; INTRAVENOUS ONCE
Status: COMPLETED | OUTPATIENT
Start: 2021-06-23 | End: 2021-06-23

## 2021-06-23 RX ADMIN — DEXAMETHASONE SODIUM PHOSPHATE 10 MG: 10 INJECTION INTRAMUSCULAR; INTRAVENOUS at 11:26

## 2021-06-23 RX ADMIN — BUPIVACAINE HYDROCHLORIDE 1.5 ML: 5 INJECTION, SOLUTION PERINEURAL at 11:26

## 2021-06-23 RX ADMIN — TRIAMCINOLONE ACETONIDE 20 MG: 40 INJECTION, SUSPENSION INTRA-ARTICULAR; INTRAMUSCULAR at 11:27

## 2021-06-23 NOTE — PATIENT INSTRUCTIONS
Plantar Fasciitis    Plantar fasciitis is a painful foot condition that affects the heel. It occurs when the band of tissue that connects the toes to the heel bone (plantar fascia) becomes irritated. This can happen as the result of exercising too much or doing other repetitive activities (overuse injury).  The pain from plantar fasciitis can range from mild irritation to severe pain that makes it difficult to walk or move. The pain is usually worse in the morning after sleeping, or after sitting or lying down for a while. Pain may also be worse after long periods of walking or standing.  What are the causes?  This condition may be caused by:  · Standing for long periods of time.  · Wearing shoes that do not have good arch support.  · Doing activities that put stress on joints (high-impact activities), including running, aerobics, and ballet.  · Being overweight.  · An abnormal way of walking (gait).  · Tight muscles in the back of your lower leg (calf).  · High arches in your feet.  · Starting a new athletic activity.  What are the signs or symptoms?  The main symptom of this condition is heel pain. Pain may:  · Be worse with first steps after a time of rest, especially in the morning after sleeping or after you have been sitting or lying down for a while.  · Be worse after long periods of standing still.  · Decrease after 30-45 minutes of activity, such as gentle walking.  How is this diagnosed?  This condition may be diagnosed based on your medical history and your symptoms. Your health care provider may ask questions about your activity level. Your health care provider will do a physical exam to check for:  · A tender area on the bottom of your foot.  · A high arch in your foot.  · Pain when you move your foot.  · Difficulty moving your foot.  You may have imaging tests to confirm the diagnosis, such as:  · X-rays.  · Ultrasound.  · MRI.  How is this treated?  Treatment for plantar fasciitis depends on how  severe your condition is. Treatment may include:  · Rest, ice, applying pressure (compression), and raising the affected foot (elevation). This may be called RICE therapy. Your health care provider may recommend RICE therapy along with over-the-counter pain medicines to manage your pain.  · Exercises to stretch your calves and your plantar fascia.  · A splint that holds your foot in a stretched, upward position while you sleep (night splint).  · Physical therapy to relieve symptoms and prevent problems in the future.  · Injections of steroid medicine (cortisone) to relieve pain and inflammation.  · Stimulating your plantar fascia with electrical impulses (extracorporeal shock wave therapy). This is usually the last treatment option before surgery.  · Surgery, if other treatments have not worked after 12 months.  Follow these instructions at home:    Managing pain, stiffness, and swelling  · If directed, put ice on the painful area:  ? Put ice in a plastic bag, or use a frozen bottle of water.  ? Place a towel between your skin and the bag or bottle.  ? Roll the bottom of your foot over the bag or bottle.  ? Do this for 20 minutes, 2-3 times a day.  · Wear athletic shoes that have air-sole or gel-sole cushions, or try wearing soft shoe inserts that are designed for plantar fasciitis.  · Raise (elevate) your foot above the level of your heart while you are sitting or lying down.  Activity  · Avoid activities that cause pain. Ask your health care provider what activities are safe for you.  · Do physical therapy exercises and stretches as told by your health care provider.  · Try activities and forms of exercise that are easier on your joints (low-impact). Examples include swimming, water aerobics, and biking.  General instructions  · Take over-the-counter and prescription medicines only as told by your health care provider.  · Wear a night splint while sleeping, if told by your health care provider. Loosen the splint  if your toes tingle, become numb, or turn cold and blue.  · Maintain a healthy weight, or work with your health care provider to lose weight as needed.  · Keep all follow-up visits as told by your health care provider. This is important.  Contact a health care provider if you:  · Have symptoms that do not go away after caring for yourself at home.  · Have pain that gets worse.  · Have pain that affects your ability to move or do your daily activities.  Summary  · Plantar fasciitis is a painful foot condition that affects the heel. It occurs when the band of tissue that connects the toes to the heel bone (plantar fascia) becomes irritated.  · The main symptom of this condition is heel pain that may be worse after exercising too much or standing still for a long time.  · Treatment varies, but it usually starts with rest, ice, compression, and elevation (RICE therapy) and over-the-counter medicines to manage pain.  This information is not intended to replace advice given to you by your health care provider. Make sure you discuss any questions you have with your health care provider.  Document Revised: 11/30/2018 Document Reviewed: 10/15/2018  Elsevier Patient Education © 2021 [employer]

## 2021-06-29 ENCOUNTER — TELEPHONE (OUTPATIENT)
Dept: PODIATRY | Facility: CLINIC | Age: 41
End: 2021-06-29

## 2021-06-29 NOTE — TELEPHONE ENCOUNTER
Spoke with patient regarding xray results. Informed patient that he has mild-moderate heel spurring and some arthritic changes in his mid foot/big toes. Pt expressed understanding and did not have any questions

## 2021-07-08 RX ORDER — POTASSIUM CHLORIDE 750 MG/1
20 TABLET, FILM COATED, EXTENDED RELEASE ORAL DAILY
Qty: 60 TABLET | Refills: 5 | Status: SHIPPED | OUTPATIENT
Start: 2021-07-08 | End: 2022-02-02 | Stop reason: SDUPTHER

## 2021-07-28 ENCOUNTER — TELEPHONE (OUTPATIENT)
Dept: PODIATRY | Facility: CLINIC | Age: 41
End: 2021-07-28

## 2021-07-28 ENCOUNTER — OFFICE VISIT (OUTPATIENT)
Dept: FAMILY MEDICINE CLINIC | Facility: CLINIC | Age: 41
End: 2021-07-28

## 2021-07-28 VITALS
DIASTOLIC BLOOD PRESSURE: 78 MMHG | HEIGHT: 71 IN | WEIGHT: 315 LBS | SYSTOLIC BLOOD PRESSURE: 122 MMHG | TEMPERATURE: 97.6 F | OXYGEN SATURATION: 97 % | HEART RATE: 59 BPM | BODY MASS INDEX: 44.1 KG/M2 | RESPIRATION RATE: 18 BRPM

## 2021-07-28 DIAGNOSIS — K21.9 GASTROESOPHAGEAL REFLUX DISEASE, UNSPECIFIED WHETHER ESOPHAGITIS PRESENT: ICD-10-CM

## 2021-07-28 DIAGNOSIS — R60.9 EDEMA, UNSPECIFIED TYPE: ICD-10-CM

## 2021-07-28 DIAGNOSIS — E66.9 OBESITY, UNSPECIFIED CLASSIFICATION, UNSPECIFIED OBESITY TYPE, UNSPECIFIED WHETHER SERIOUS COMORBIDITY PRESENT: ICD-10-CM

## 2021-07-28 DIAGNOSIS — K76.0 FATTY LIVER: ICD-10-CM

## 2021-07-28 DIAGNOSIS — Z86.16 HISTORY OF COVID-19: ICD-10-CM

## 2021-07-28 DIAGNOSIS — I10 ESSENTIAL HYPERTENSION: ICD-10-CM

## 2021-07-28 DIAGNOSIS — G47.33 OBSTRUCTIVE SLEEP APNEA: ICD-10-CM

## 2021-07-28 PROCEDURE — 99214 OFFICE O/P EST MOD 30 MIN: CPT | Performed by: FAMILY MEDICINE

## 2021-07-28 NOTE — PROGRESS NOTES
Subjective   Hernandez Pizarro is a 40 y.o. male presenting with chief complaint of:   Chief Complaint   Patient presents with   • Follow-up     6 month followup, fatty liver   • Hypertension       History of Present Illness :  Alone.       Has multiple chronic problems to consider that might have a bearing on today's issues;  an interval appointment.       Chronic/acute problems reviewed today:   1. History of COVID-19 chronic past history of having Covid December 20.  Feels he is completely recovered   2. Essential hypertension Chronic/stable. Stable here past/and occ home blood pressures.  No significant chest pain, SOB, LE edema, orthopnea, near syncope, dizziness/light headness.   Recent Vitals       1/29/2021 6/23/2021 7/28/2021       BP:  131/88  142/70  122/78     Pulse:  76  73  59     Temp:  98 °F (36.7 °C)  --  97.6 °F (36.4 °C)     Weight:  --  (!) 147 kg (323 lb 12.8 oz)  (!) 146 kg (322 lb)     BMI (Calculated):  --  45.2  44.9            3. Obesity, unspecified classification, unspecified obesity type, unspecified whether serious comorbidity present Chronic/stable.  Aware, advised weight loss before.  On/off some success with weight loss but often with weight gain back.      4. Gastroesophageal reflux disease, unspecified whether esophagitis present Chronic/stable.  Controlled heartburn, reflux without dysphagia, melena.  Rx used, periods not used proven needed with symptoms -currently doing ok.      5. Fatty liver: 6.9.20; 12m Chronic/stable.  Aware treatment involves normalizing weight; usually including low fat diet and regular exercise.  Aware condition can go on to cirrhosis and death.  Stable occ liver labs and past imaging.       6. Obstructive sleep apnea: compliant Chronic/stable.  Uses cpap compliantly without significant problems with equipment.  Finds sleeps better and less sleepy during the day.       7. Edema, unspecified type chronic/variable.  Recently stable with lasix.  Causes include:  obesity.pulmonary hypertesion.         Has an/another acute issue today: none.    The following portions of the patient's history were reviewed and updated as appropriate: allergies, current medications, past family history, past medical history, past social history, past surgical history and problem list.      Current Outpatient Medications:   •  albuterol sulfate HFA (ProAir HFA) 108 (90 Base) MCG/ACT inhaler, Inhale 2 puffs Every 4 (Four) Hours As Needed for Wheezing., Disp: 8.5 g, Rfl: 2  •  amLODIPine (Norvasc) 10 MG tablet, Take 1 tablet by mouth Daily., Disp: 30 tablet, Rfl: 5  •  furosemide (Lasix) 20 MG tablet, Take 1 tablet by mouth 2 (Two) Times a Day., Disp: 180 tablet, Rfl: 1  •  potassium chloride 10 MEQ CR tablet, Take 2 tablets by mouth Daily., Disp: 60 tablet, Rfl: 5    No problems with medications.    No Known Allergies    Review of Systems  GENERAL:  Inactive/slower with limits, speed, stamina for age and SOB occ; back to work/ . Sleep is ok usually; with cpap. No fever now/recent.  SKIN: No rash/skin lesion of concern.  ENDO:  No syncope, near or diaphoretic sweaty spells.  BS Ok recent.  HEENT: No recent head injury; or headache.  No vision change.  No hearing loss.  Ears without pain/drainage.  No sore throat.  No significant nasal/sinus congestion/drainage. No epistaxis.  CHEST: No chest wall tenderness or mass. No significant cough,  without wheeze.  Occ mild SOB; no hemoptysis.  CV: No exertional chest pain, palpitations, occ/ankle edema.  GI: No dysphagia or heartburn.  No abdominal pain, diarrhea, constipation.  No rectal bleeding, or melena.    :  Voids without dysuria, or incontinence to completion.  ORTHO: No painful/swollen joints but various on /off sore.  No sore neck or back.  No acute neck or back pain without recent injury.  NEURO: No focal/significant weakness of extremities. No dizziness.   No numbness/paresthesias.   PSYCH: No memory loss.  Mood good usually;  admits with this anxious without depressed but/and not suicidal.  Tries to tolerate stress .   Screening:  Mammogram: NA  Bone density: NA  Low dose CT chest: Tobacco-smoker/none  GI: NA  Prostate: NA  Usual lab order  NA      Data reviewed:   Last cardiac testing:   Echo:   Results for orders placed during the hospital encounter of 06/04/20    Adult Stress Echo W/ Cont or Stress Agent if Necessary Per Protocol    Interpretation Summary  · Left ventricular systolic function is normal.    LOW RISK FOR ISCHEMIA    5.20.20  IMPRESSION:  Impression: There is no evidence of deep venous thrombosis or  superficial thrombophlebitis of right or left lower extremities.     Comments: Bilateral lower extremity venous duplex exam was performed  using color Doppler flow, Doppler waveform analysis, and grayscale  imaging, with and without compression. There is no evidence of deep  venous thrombosis in the common femoral, superficial femoral, popliteal,  peroneal, anterior tibial, and posterior tibial veins bilaterally. No  thrombus is identified in the saphenofemoral junctions and greater  saphenous veins bilaterally.      6m CBC, CMP, A1c  12m CBC, CMP, A1c, LIPID, TSH    Lab Results:  Results for orders placed or performed during the hospital encounter of 01/29/21   Wound Culture - Wound, Leg, Right    Specimen: Leg, Right; Wound   Result Value Ref Range    Wound Culture Light growth (2+) Streptococcus pyogenes (Group A) (A)     Gram Stain No WBCs seen     Gram Stain Rare (1+) Gram positive cocci in pairs        Susceptibility    Streptococcus pyogenes (Group A) - ERASTO     Ceftriaxone <=0.12 Susceptible ug/ml     Clindamycin <=0.25 Resistant ug/ml     Inducible Clindamycin Resistance POS Positive ug/ml     Levofloxacin 0.5 Susceptible ug/ml     Penicillin G <=0.06 Susceptible ug/ml     Vancomycin <=0.12 Susceptible ug/ml   POCT Rapid strep A    Specimen: Swab   Result Value Ref Range    Rapid Strep A Screen Negative Negative,  "VALID, INVALID, Not Performed    Internal Control Passed Passed    Lot Number [id number]     Expiration Date 06/30/2021        A1C:  Lab Results - Last 18 Months   Lab Units 06/11/20  0901   HEMOGLOBIN A1C % 5.61*     LIPID:No results for input(s): CHLPL, LDL, HDL, TRIG in the last 85886 hours.  PSA:No results for input(s): PSA in the last 52151 hours.  CBC:  Lab Results - Last 18 Months   Lab Units 06/11/20 0901 06/06/20 2034 06/04/20 1833 06/01/20 2116   WBC 10*3/mm3  --  10.28 8.14 9.14   HEMOGLOBIN g/dL  --  15.6 14.4 15.7   HEMATOCRIT %  --  43.3 41.9 45.5   PLATELETS 10*3/mm3  --  252 236 240   IRON mcg/dL 96  --   --   --       BMP/CMP:  Lab Results - Last 18 Months   Lab Units 10/07/20  0649 07/07/20 0625 06/06/20 2034 06/04/20 1833 06/01/20 2116   SODIUM mmol/L 143 137 138 141 140   POTASSIUM mmol/L 4.1 4.0 3.4* 3.8 3.8   CHLORIDE mmol/L 106 103 102 106 104   CO2 mmol/L 29.6* 24.2 18.0* 23.0 21.0*   BUN mg/dL 18 24* 16 23* 21*   CREATININE mg/dL 0.92 1.00 0.81 0.97 0.90   EGFR IF NONAFRICN AM mL/min/1.73 91 83 106 86 94   CALCIUM mg/dL 9.2 9.3 9.5 8.5* 9.2     HEPATIC:  Lab Results - Last 18 Months   Lab Units 07/07/20  0625 06/11/20 0901 06/06/20 2034 06/04/20 1833 06/01/20 2116   ALT (SGPT) U/L 107* 78* 60* 58* 59*   AST (SGOT) U/L 54* 46* 33 32 30   ALK PHOS U/L 140* 119* 133* 119* 132*     THYROID:  Lab Results - Last 18 Months   Lab Units 06/01/20 2116   TSH uIU/mL 2.650   FREE T4 ng/dL 1.32       Objective   /78 (BP Location: Right arm, Patient Position: Sitting, Cuff Size: Adult)   Pulse 59   Temp 97.6 °F (36.4 °C) (Infrared)   Resp 18   Ht 180.3 cm (71\")   Wt (!) 146 kg (322 lb)   SpO2 97%   BMI 44.91 kg/m²   Body mass index is 44.91 kg/m².    Recent Vitals       1/29/2021 6/23/2021 7/28/2021       BP:  131/88  142/70  122/78     Pulse:  76  73  59     Temp:  98 °F (36.7 °C)  --  97.6 °F (36.4 °C)     Weight:  --  (!) 147 kg (323 lb 12.8 oz)  (!) 146 kg (322 lb)     BMI " (Calculated):  --  45.2  44.9           Physical Exam  GENERAL:  Well nourished/developed in no acute distress.   SKIN: Turgor excellent, without wound, rash, lesion.  HEENT: Normal cephalic without trauma.  Pupils equal round reactive to light. Extraocular motions full without nystagmus.     Oral cavity without growths, exudates, and moist.  Posterior pharynx without mass, obstruction, redness.  No thyromegaly, mass, tenderness, lymphadenopathy and supple.  CV: Regular rhythm.  No murmur, gallop, edema. Posterior pulses intact.  No carotid bruits.  CHEST: No chest wall tenderness or mass.   LUNGS: Symmetric motion with clear to auscultation.    ABD: Soft, nontender without mass.   ORTHO: Symmetric extremities without swelling/point tenderness.  Full gross range of motion.  NEURO: CN 2-12 grossly intact.  Symmetric facies and UE/LE. 3-4/5 strength throughout. 1/4 x bicep equal reflexes.  Nonfocal use extremities. Speech clear. Intact light touch with monofilament, vibratory sensation with tuning fork; equal toes/distal feet.    PSYCH: Oriented x 3.  Pleasant calm, well kept.  Purposeful/directed conservation with intact short/long gross memory.       Assessment/Plan     1. History of COVID-19    2. Essential hypertension    3. Obesity, unspecified classification, unspecified obesity type, unspecified whether serious comorbidity present    4. Gastroesophageal reflux disease, unspecified whether esophagitis present    5. Fatty liver: 6.9.20; 12m    6. Obstructive sleep apnea: compliant    7. Edema, unspecified type      Discussion:  See liver enzymes today; maybe MRI liver due to obesity/risk muñiz  Same Rx  Goals; weight loss over time remain  Need cpap for DOT    Medical decision issues:   Data review above:   Rx: reviewed and decisions:   Same Rx for now   Visit today involved many chronic problems, differentials and intensive drug monitoring: ie potential to cause serious morbidity or death:   No orders of the  defined types were placed in this encounter.    Orders placed:   LAB/Testing/Referrals: reviewed/orders:   Today:   Orders Placed This Encounter   Procedures   • Comprehensive Metabolic Panel   • Hemoglobin A1c   • TSH   • CBC & Differential     Chronic/recurrent labs above or change to:   same    Health maintenance:   Body mass index is 44.91 kg/m².  Patient's Body mass index is 44.91 kg/m². indicating that he is morbidly obese (BMI > 40 or > 35 with obesity - related health condition). Obesity-related health conditions include the following: hypertension. Obesity is unchanged. BMI is is above average; BMI management plan is completed. We discussed portion control and increasing exercise..    Tobacco use reviewed:    Hernandez Pizarro  reports that he has never smoked. He has never used smokeless tobacco..         There are no Patient Instructions on file for this visit.    Follow up: Return for lab today/, THEN, lab;, Dr Park-, 12m;.  Future Appointments   Date Time Provider Department Center   7/29/2021 10:30 AM Robbi Montano APRN [facility]   8/1/2022  9:00 AM Frandy Park MD [facility]             Answers for HPI/ROS submitted by the patient on 7/27/2021  What is the primary reason for your visit?: Other  Please describe your symptoms.: Looking to do annual check up visit, possible blood panel for liver function  Have you had these symptoms before?: Yes  How long have you been having these symptoms?: Greater than 2 weeks    10.12.21  ADDENDUM:    Notified by  [facility] that he needs a face-to-face note documenting the need for replacement CPAP.  He has has been recalled and or broken beyond repair.  Obviously he still needs the treatment and even though seen in July his condition has not changed and he needs to stay on his CPAP.  He needs his machine replaced

## 2021-07-29 ENCOUNTER — OFFICE VISIT (OUTPATIENT)
Dept: PODIATRY | Facility: CLINIC | Age: 41
End: 2021-07-29

## 2021-07-29 VITALS
HEIGHT: 71 IN | HEART RATE: 63 BPM | DIASTOLIC BLOOD PRESSURE: 80 MMHG | BODY MASS INDEX: 44.1 KG/M2 | OXYGEN SATURATION: 98 % | WEIGHT: 315 LBS | SYSTOLIC BLOOD PRESSURE: 140 MMHG

## 2021-07-29 DIAGNOSIS — M79.672 FOOT PAIN, LEFT: Primary | ICD-10-CM

## 2021-07-29 DIAGNOSIS — M72.2 PLANTAR FASCIITIS, LEFT: ICD-10-CM

## 2021-07-29 LAB
ALBUMIN SERPL-MCNC: 4.3 G/DL (ref 3.5–5.2)
ALBUMIN/GLOB SERPL: 1.6 G/DL
ALP SERPL-CCNC: 133 U/L (ref 39–117)
ALT SERPL-CCNC: 30 U/L (ref 1–41)
AST SERPL-CCNC: 26 U/L (ref 1–40)
BASOPHILS # BLD AUTO: 0.04 10*3/MM3 (ref 0–0.2)
BASOPHILS NFR BLD AUTO: 0.6 % (ref 0–1.5)
BILIRUB SERPL-MCNC: 0.4 MG/DL (ref 0–1.2)
BUN SERPL-MCNC: 18 MG/DL (ref 6–20)
BUN/CREAT SERPL: 19.8 (ref 7–25)
CALCIUM SERPL-MCNC: 9.5 MG/DL (ref 8.6–10.5)
CHLORIDE SERPL-SCNC: 103 MMOL/L (ref 98–107)
CO2 SERPL-SCNC: 27 MMOL/L (ref 22–29)
CREAT SERPL-MCNC: 0.91 MG/DL (ref 0.76–1.27)
EOSINOPHIL # BLD AUTO: 0.19 10*3/MM3 (ref 0–0.4)
EOSINOPHIL NFR BLD AUTO: 2.9 % (ref 0.3–6.2)
ERYTHROCYTE [DISTWIDTH] IN BLOOD BY AUTOMATED COUNT: 13 % (ref 12.3–15.4)
GLOBULIN SER CALC-MCNC: 2.7 GM/DL
GLUCOSE SERPL-MCNC: 92 MG/DL (ref 65–99)
HBA1C MFR BLD: 5.3 % (ref 4.8–5.6)
HCT VFR BLD AUTO: 47.2 % (ref 37.5–51)
HGB BLD-MCNC: 15.6 G/DL (ref 13–17.7)
IMM GRANULOCYTES # BLD AUTO: 0.02 10*3/MM3 (ref 0–0.05)
IMM GRANULOCYTES NFR BLD AUTO: 0.3 % (ref 0–0.5)
LYMPHOCYTES # BLD AUTO: 2.36 10*3/MM3 (ref 0.7–3.1)
LYMPHOCYTES NFR BLD AUTO: 36.1 % (ref 19.6–45.3)
MCH RBC QN AUTO: 28.2 PG (ref 26.6–33)
MCHC RBC AUTO-ENTMCNC: 33.1 G/DL (ref 31.5–35.7)
MCV RBC AUTO: 85.4 FL (ref 79–97)
MONOCYTES # BLD AUTO: 0.74 10*3/MM3 (ref 0.1–0.9)
MONOCYTES NFR BLD AUTO: 11.3 % (ref 5–12)
NEUTROPHILS # BLD AUTO: 3.19 10*3/MM3 (ref 1.7–7)
NEUTROPHILS NFR BLD AUTO: 48.8 % (ref 42.7–76)
NRBC BLD AUTO-RTO: 0 /100 WBC (ref 0–0.2)
PLATELET # BLD AUTO: 250 10*3/MM3 (ref 140–450)
POTASSIUM SERPL-SCNC: 4.4 MMOL/L (ref 3.5–5.2)
PROT SERPL-MCNC: 7 G/DL (ref 6–8.5)
RBC # BLD AUTO: 5.53 10*6/MM3 (ref 4.14–5.8)
SODIUM SERPL-SCNC: 141 MMOL/L (ref 136–145)
TSH SERPL DL<=0.005 MIU/L-ACNC: 1.85 UIU/ML (ref 0.27–4.2)
WBC # BLD AUTO: 6.54 10*3/MM3 (ref 3.4–10.8)

## 2021-07-29 PROCEDURE — 99212 OFFICE O/P EST SF 10 MIN: CPT | Performed by: NURSE PRACTITIONER

## 2021-07-29 NOTE — PROGRESS NOTES
[facility] - PODIATRY    Today's Date: 07/29/21    Patient Name: Hernandez Pizarro  MRN: [id number]  CSN: [id number]  PCP: Frandy Park MD  Referring Provider: No ref. provider found    SUBJECTIVE     Chief Complaint   Patient presents with   • Follow-up     pcp01/14/2021 left foot pain- pt states had injection in left foot last visdit and it has improved drastically.- pt states very little pain in left foot 2/10- pt presents with calluses to bottoms of feet     HPI: Hernandez Pizarro, a 41 y.o.male, comes to clinic as a(n) established patient for follow-up treatment of Left foot plantar fasciitis. Patient has h/o COPD, HTN, Encephalitis. Liver Fibrosis, Sleep Apnea. Patient presents 1 month after initial evaluation with steroid injection for plantar fasciitis of the left foot.  Patient states that he has purchased and has been wearing power step inserts in shoes.  Has also been stretching and icing as instructed.  Notes that pain has improved over 100% from initial evaluation. Admits pain at 2/10 level and described as shooting and aching. Relates previous treatment(s) including Steroid injection, stretching, icing, inserts. Denies any constitutional symptoms. No other pedal complaints at this time.    Past Medical History:   Diagnosis Date   • COPD (chronic obstructive pulmonary disease) (CMS/HCC)    • Encephalitis    • Hypertension    • Liver fibrosis     stage 3   • Pancreatitis    • Pulmonary hypertension (CMS/HCC)     patient states he haad a tralee event while giving blood and went into pulmonary htn   • Sleep apnea     cpap     Past Surgical History:   Procedure Laterality Date   • CHOLECYSTECTOMY     • ENDOSCOPY N/A 6/17/2020    Procedure: ESOPHAGOGASTRODUODENOSCOPY WITH ANESTHESIA;  Surgeon: Solo Chirinos MD;  Location: [facility] ENDOSCOPY;  Service: Gastroenterology;  Laterality: N/A;  preop; acid reflux; abdominal pain; difficulty swallowing  postop  Frandy Park MD   •  SINUS SURGERY       Family History   Problem Relation Age of Onset   • Colon cancer Father      Social History     Socioeconomic History   • Marital status:      Spouse name: Yudelka   • Number of children: 2   • Years of education: 14   • Highest education level: Some college, no degree   Tobacco Use   • Smoking status: Never Smoker   • Smokeless tobacco: Never Used   Vaping Use   • Vaping Use: Never used   Substance and Sexual Activity   • Alcohol use: No   • Drug use: No   • Sexual activity: Yes     Partners: Female     Birth control/protection: Surgical     No Known Allergies  Current Outpatient Medications   Medication Sig Dispense Refill   • albuterol sulfate HFA (ProAir HFA) 108 (90 Base) MCG/ACT inhaler Inhale 2 puffs Every 4 (Four) Hours As Needed for Wheezing. 8.5 g 2   • amLODIPine (Norvasc) 10 MG tablet Take 1 tablet by mouth Daily. 30 tablet 5   • furosemide (Lasix) 20 MG tablet Take 1 tablet by mouth 2 (Two) Times a Day. 180 tablet 1   • potassium chloride 10 MEQ CR tablet Take 2 tablets by mouth Daily. 60 tablet 5     No current facility-administered medications for this visit.     Review of Systems   Constitutional: Negative for chills and fever.   HENT: Negative for congestion.    Respiratory: Negative for shortness of breath.    Cardiovascular: Positive for leg swelling. Negative for chest pain.   Gastrointestinal: Negative for constipation, diarrhea, nausea and vomiting.   Musculoskeletal: Positive for arthralgias.        Foot pain -improved   Skin: Negative for wound.   Neurological: Negative for numbness.       OBJECTIVE     Vitals:    07/29/21 1019   BP: 140/80   Pulse: 63   SpO2: 98%       PHYSICAL EXAM  GEN:   Accompanied by none.     Foot/Ankle Exam:       General:   Appearance: appears stated age and healthy and obesity    Orientation: AAOx3    Affect: appropriate    Gait: unimpaired    Assistance: independent    Shoe Gear:  Casual shoes (With power step inserts)    VASCULAR       Right Foot Vascularity   Dorsalis pedis:  2+  Posterior tibial:  2+  Skin Temperature: warm    Edema Gradin+ and pitting  CFT:  3  Pedal Hair Growth:  Present  Varicosities: mild varicosities       Left Foot Vascularity   Dorsalis pedis:  2+  Posterior tibial:  2+  Skin Temperature: warm    Edema Gradin+ and pitting  CFT:  3  Pedal Hair Growth:  Present  Varicosities: mild varicosities        NEUROLOGIC     Right Foot Neurologic   Normal sensation    Light touch sensation:  Normal  Vibratory sensation:  Normal  Hot/Cold sensation: normal    Protective Sensation using [place] Monofilament:  10     Left Foot Neurologic   Normal sensation    Light touch sensation:  Normal  Vibratory sensation:  Normal  Hot/cold sensation: normal    Protective Sensation using [place] Monofilament:  10     MUSCULOSKELETAL      Right Foot Musculoskeletal   Ecchymosis:  None  Tenderness: none    Arch:  Pes planus     Left Foot Musculoskeletal   Ecchymosis:  None  Tenderness: none    Arch:  Pes planus     MUSCLE STRENGTH     Right Foot Muscle Strength   Foot dorsiflexion:  5  Foot plantar flexion:  5  Foot inversion:  5  Foot eversion:  5     Left Foot Muscle Strength   Foot dorsiflexion:  5  Foot plantar flexion:  5  Foot inversion:  5  Foot eversion:  5     RANGE OF MOTION      Right Foot Range of Motion   Ankle dorsiflexion:  5     Left Foot Range of Motion   Ankle dorsiflexion:  5     DERMATOLOGIC     Right Foot Dermatologic   Skin: skin intact       Left Foot Dermatologic   Skin: skin intact        RADIOLOGY/NUCLEAR:  No results found.    LABORATORY/CULTURE RESULTS:      PATHOLOGY RESULTS:       ASSESSMENT/PLAN     Diagnoses and all orders for this visit:    1. Foot pain, left (Primary)    2. Plantar fasciitis, left      Comprehensive lower extremity examination and evaluation was performed.  Discussed findings and treatment plan including risks, benefits, and treatment options with patient in detail.  Patient agreed with treatment plan.  After written consent obtained, plantar fascial injection performed as documented in procedure note. Post-procedure instructions given.  Conservative therapy including daily stretching (demonstrated proper way of performing), icing 3x daily, decreased activity, and nsaids PRN.   Continue use of power step inserts and shoes.  Xrays reviewed, mild calcaneal spurring bilaterally.  An After Visit Summary was printed and given to the patient at discharge, including (if requested) any available informative/educational handouts regarding diagnosis, treatment, or medications. All questions were answered to patient/family satisfaction. Should symptoms fail to improve or worsen they agree to call or return to clinic or to go to the Emergency Department. Discussed the importance of following up with any needed screening tests/labs/specialist appointments and any requested follow-up recommended by me today. Importance of maintaining follow-up discussed and patient accepts that missed appointments can delay diagnosis and potentially lead to worsening of conditions.  Return if symptoms worsen or fail to improve., or sooner if acute issues arise.    Procedures      Lab Frequency Next Occurrence   Adult Transthoracic Echo Complete W/ Cont if Necessary Per Protocol Once 05/27/2020   Follow Anesthesia Guidelines / Protocol Once 06/11/2020   Obtain Informed Consent Once 06/16/2020   Diet: Once 06/17/2020   COVID PRE-OP / PRE-PROCEDURE SCREENING ORDER (NO ISOLATION) - Swab, Oropharynx Once 07/23/2020       This document has been electronically signed by SCOOTER Murry on July 29, 2021 10:31 CDT

## 2021-08-19 ENCOUNTER — TELEPHONE (OUTPATIENT)
Dept: FAMILY MEDICINE CLINIC | Facility: CLINIC | Age: 41
End: 2021-08-19

## 2021-08-19 NOTE — TELEPHONE ENCOUNTER
Current CDC guidance as to self quarantine x14 days for exposure in the unvaccinated patient.  This is without regard to have her having Covid before.  If asymptomatic, a negative test would not get the patient out of isolation any quicker according to the current guidelines.    Electronically signed by SCOOTER Stone, 08/19/21, 10:15 AM CDT.

## 2021-08-19 NOTE — TELEPHONE ENCOUNTER
Advised testing no earlier than 6 days of asymptomatic or when he becomes symptomatic-whichever comes first.  If vaccinated, does not have to self quarantine but should be masking in public.    Electronically signed by SCOOTER Stone, 08/19/21, 8:59 AM CDT.

## 2021-08-19 NOTE — TELEPHONE ENCOUNTER
"Called pt and advised \"we all had covid back in end of Dec, into jan. Im not vaccinated.  my little tested negative, and my oldest dtr tested two days ago and the results came in today. I have not been vaccinated and my work wants to know if I can come back to work or if I need to self isolate?\"     Advised will send to provider to ask his questions  "

## 2021-08-19 NOTE — TELEPHONE ENCOUNTER
Caller: Hernandez Pizarro    Relationship to patient: Self    Best call back number: [phone] (H)    Date of exposure: 08/16/21    Date of positive COVID19 test: HAS NOT BEEN TESTED     Date if possible COVID19 exposure: 08/16/21    COVID19 symptoms: NO SYMPTOMS     Date of initial quarantine: WANTS TO KNOW WHAT THE PROVIDER WOULD LIKE FOR HIM TO DO   STATES HIS WORK IS ASKING HIM TO CALL AND ASK FOR RECOMMENDATIONS     Additional information or concerns:STATES HIS DAUGHTER IS POSITIVE FOR COVID     What is the patients preferred pharmacy:   [facility]  [phone]

## 2021-08-19 NOTE — TELEPHONE ENCOUNTER
Notified pt and advised that to go to ER/ED dept if worsens over the weekend and to check back in on Monday about the test and continued self isolate

## 2021-08-23 ENCOUNTER — TELEPHONE (OUTPATIENT)
Dept: FAMILY MEDICINE CLINIC | Facility: CLINIC | Age: 41
End: 2021-08-23

## 2021-08-23 NOTE — TELEPHONE ENCOUNTER
Pt is going to come in tomorrow for a covid swab here he did not want to go to [facility]

## 2021-08-23 NOTE — TELEPHONE ENCOUNTER
PATIENT CALLED IN REQUESTING AN ORDER FOR A COVID 19 TEST BE SENT TO [facility]   PATIENT STATES HIS WORK IS REQUIRING HE BE TESTED BEFORE RETURNING TO WORK AFTER EXPOSURE    [facility] CALL BACK  [phone]

## 2021-08-24 ENCOUNTER — CLINICAL SUPPORT (OUTPATIENT)
Dept: FAMILY MEDICINE CLINIC | Facility: CLINIC | Age: 41
End: 2021-08-24

## 2021-08-24 VITALS — OXYGEN SATURATION: 97 % | TEMPERATURE: 101.1 F

## 2021-08-24 DIAGNOSIS — Z20.822 CONTACT WITH AND (SUSPECTED) EXPOSURE TO COVID-19: Primary | ICD-10-CM

## 2021-08-24 PROCEDURE — 99211 OFF/OP EST MAY X REQ PHY/QHP: CPT | Performed by: FAMILY MEDICINE

## 2021-08-24 NOTE — PROGRESS NOTES
Patient at drive up for Covid 19 swab, done per oropharynx as ordered, patient tolerated well, specimen secured and placed in freezer with order as directed. Pt advised he was just a little congested and did have a temp at drive up 101.0, then 99.1 infrared.

## 2021-08-25 LAB
LABCORP SARS-COV-2, NAA 2 DAY TAT: NORMAL
SARS-COV-2 RNA RESP QL NAA+PROBE: NOT DETECTED

## 2021-08-26 ENCOUNTER — TELEPHONE (OUTPATIENT)
Dept: FAMILY MEDICINE CLINIC | Facility: CLINIC | Age: 41
End: 2021-08-26

## 2021-08-26 NOTE — TELEPHONE ENCOUNTER
Caller: Hernandez Pizarro    Relationship to patient: Self    Best call back number: [phone]     Patient is needing: PATIENT WANTS TO KNOW IF HE STILL NEEDS TO QUARANTINE SINCE HE GOT A NEGATIVE COVID TEST.

## 2021-08-26 NOTE — TELEPHONE ENCOUNTER
CDC guidelines has negative asymptomatic patient with exposure that is unvaccinated is a 14-day quarantine from time of exposure which presumably would be when the last family member became symptomatic and/or tested positive.    Electronically signed by SCOOTER Stone, 08/26/21, 3:21 PM CDT.

## 2021-08-26 NOTE — TELEPHONE ENCOUNTER
Caller: MIK JOHNSON    Relationship to patient: PATIENT    Best call back number: [phone]    PATIENT CALLED TO SEE IF COVID RESULTS WERE IN YET.

## 2021-08-26 NOTE — TELEPHONE ENCOUNTER
8/23/2021  [facility]     Frandy Park MD  Family Medicine   Progress Notes  Chema Varela APRN (Nurse Practitioner) • • Family Medicine     Please call the patient -Covid negative-I am out of the loop on this patient, offer follow-up if needed.  Make sure you are doing okay.Electronically signed by SCOOTER Stone, 08/26/21, 10:45 AM CDT.         Sunitha Mejia, [place] Rep     [place]    8/23/21 9:10 AM  Note     PATIENT CALLED IN REQUESTING AN ORDER FOR A COVID 19 TEST BE SENT TO [facility]   PATIENT STATES HIS WORK IS REQUIRING HE BE TESTED BEFORE RETURNING TO WORK AFTER EXPOSURE     GOOD CALL BACK  [phone]         Patient was negative

## 2021-08-26 NOTE — PROGRESS NOTES
Please call the patient -Covid negative-I am out of the loop on this patient, offer follow-up if needed.  Make sure you are doing okay.Electronically signed by SCOOTER Stone, 08/26/21, 10:45 AM CDT.

## 2021-08-26 NOTE — TELEPHONE ENCOUNTER
He is vaccinated, ok to go back.    Electronically signed by SCOOTER Stone, 08/26/21, 1:43 PM CDT.

## 2021-08-26 NOTE — TELEPHONE ENCOUNTER
Spoke to patient and my dtr tested positive last wed 8-18-21, to quar.. 14 days will be 9-1-21, return to work 9-2-21, excuse faxed to employer fax [phone]    Advised pt if gets symptoms or worsens to call the office

## 2021-08-26 NOTE — TELEPHONE ENCOUNTER
"Spoke to patient and advised 'I have not been vaccinated, I only tested negative at your office, im not having symptoms. The health dept has not called about any of my family\"  "

## 2021-08-26 NOTE — TELEPHONE ENCOUNTER
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

## 2021-09-06 DIAGNOSIS — I10 ESSENTIAL HYPERTENSION: ICD-10-CM

## 2021-09-08 RX ORDER — AMLODIPINE BESYLATE 10 MG/1
10 TABLET ORAL DAILY
Qty: 30 TABLET | Refills: 5 | Status: SHIPPED | OUTPATIENT
Start: 2021-09-08 | End: 2022-03-03 | Stop reason: SDUPTHER

## 2021-09-13 ENCOUNTER — PATIENT MESSAGE (OUTPATIENT)
Dept: FAMILY MEDICINE CLINIC | Facility: CLINIC | Age: 41
End: 2021-09-13

## 2021-09-13 ENCOUNTER — TELEPHONE (OUTPATIENT)
Dept: FAMILY MEDICINE CLINIC | Facility: CLINIC | Age: 41
End: 2021-09-13

## 2021-09-13 NOTE — TELEPHONE ENCOUNTER
I have been told by others that there is clinically a machine replacement but it may not be forthcoming very quickly    With his particular situation and vendor; he simply going to have to have the vendor telling me if we need to do anything

## 2021-09-13 NOTE — TELEPHONE ENCOUNTER
Regarding: Non-Urgent Medical Question  Contact: [phone]  ----- Message from Elisa Chen LPN sent at 9/13/2021 11:10 AM CDT -----  Several machines recalled and just spoke to Mj this am on another of your patients, requires them to call and register their machine respironics, then get a new order faxed to [facility] MJ said all on back order     ----- Message from Hernandez Pizarro to Frandy Park MD sent at 9/13/2021 11:12 AM -----   I received a letter by [employer] on Friday afternoon regarding a recall for my Sleep Apnea machine.  It stated that the recall was because of some kind of filter break down and that the breakdown causes damage to the lungs and respiratory system, and can possibly lead to cancer.  I've been using this machine for several years and my breathing has continued to get worse over the years.  I might need a new prescription for a new sleep machine that is not part of the recall.  I registered my serial number and yes, it is affected.  But also, I was unsure if I would need a new sleep study in order to get a new machine.  I would like to get this all done before the end of the year, if possible.  Thank you.

## 2021-10-05 ENCOUNTER — TELEPHONE (OUTPATIENT)
Dept: FAMILY MEDICINE CLINIC | Facility: CLINIC | Age: 41
End: 2021-10-05

## 2021-10-05 DIAGNOSIS — G47.33 OBSTRUCTIVE SLEEP APNEA: Primary | ICD-10-CM

## 2021-10-05 NOTE — TELEPHONE ENCOUNTER
PT CALLED STATING HIS CPAP HAS BEEN RECALLED AND HE NEEDS A PRESCRIPTION FOR A NEW ONE SENT TO [facility] IN [place]    PRESCRIPTION MUST STATE THAT PT'S CPAP IS ONE OF THE RECALLED MACHINES IN ORDER FOR PT'S INSURANCE TO COVER REPLACEMENT    [phone]    CALLBACK [phone]

## 2021-10-06 NOTE — TELEPHONE ENCOUNTER
Spoke to patient and advised new order and office notes from 7-28-21 faxed to [facility]

## 2021-10-10 DIAGNOSIS — R60.9 EDEMA, UNSPECIFIED TYPE: ICD-10-CM

## 2021-10-10 DIAGNOSIS — K76.0 FATTY LIVER: ICD-10-CM

## 2021-10-10 DIAGNOSIS — E66.9 OBESITY, UNSPECIFIED CLASSIFICATION, UNSPECIFIED OBESITY TYPE, UNSPECIFIED WHETHER SERIOUS COMORBIDITY PRESENT: ICD-10-CM

## 2021-10-10 DIAGNOSIS — R03.0 ELEVATED BLOOD PRESSURE READING: ICD-10-CM

## 2021-10-11 RX ORDER — FUROSEMIDE 20 MG/1
20 TABLET ORAL 2 TIMES DAILY
Qty: 180 TABLET | Refills: 1 | Status: SHIPPED | OUTPATIENT
Start: 2021-10-11 | End: 2023-01-24

## 2021-11-23 ENCOUNTER — TELEPHONE (OUTPATIENT)
Dept: FAMILY MEDICINE CLINIC | Facility: CLINIC | Age: 41
End: 2021-11-23

## 2021-11-23 NOTE — TELEPHONE ENCOUNTER
"Want to help him  Would expect the insurance has a form to fill out?       Regarding: I need Proof of Good Health  ----- Message from Elisa Chen LPN sent at 11/23/2021  9:02 AM CST -----       ----- Message from Hernandez Pizarro to Frandy Park MD sent at 11/23/2021  7:33 AM -----   Dr. Park, I'm not sure how to go about getting a statement stating Proof of Good Health, but I am going to need one for my employer Life insurance coverage.  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  But I wasn't sure if this is a document that I can get from my PCP.  If so, I need to get this document from my PCP.  Thank you for your time.    "

## 2021-12-02 ENCOUNTER — APPOINTMENT (OUTPATIENT)
Dept: CT IMAGING | Facility: HOSPITAL | Age: 41
End: 2021-12-02

## 2021-12-02 ENCOUNTER — APPOINTMENT (OUTPATIENT)
Dept: GENERAL RADIOLOGY | Facility: HOSPITAL | Age: 41
End: 2021-12-02

## 2021-12-02 ENCOUNTER — HOSPITAL ENCOUNTER (EMERGENCY)
Facility: HOSPITAL | Age: 41
Discharge: HOME | End: 2021-12-02
Admitting: FAMILY MEDICINE

## 2021-12-02 VITALS
WEIGHT: 315 LBS | RESPIRATION RATE: 18 BRPM | OXYGEN SATURATION: 100 % | HEART RATE: 81 BPM | SYSTOLIC BLOOD PRESSURE: 147 MMHG | TEMPERATURE: 98.1 F | BODY MASS INDEX: 44.1 KG/M2 | HEIGHT: 71 IN | DIASTOLIC BLOOD PRESSURE: 81 MMHG

## 2021-12-02 DIAGNOSIS — R93.7 ABNORMAL CT OF THORACIC SPINE: Primary | ICD-10-CM

## 2021-12-02 DIAGNOSIS — T14.8XXA MUSCLE STRAIN: ICD-10-CM

## 2021-12-02 PROCEDURE — 96372 THER/PROPH/DIAG INJ SC/IM: CPT

## 2021-12-02 PROCEDURE — 99283 EMERGENCY DEPT VISIT LOW MDM: CPT

## 2021-12-02 PROCEDURE — 72131 CT LUMBAR SPINE W/O DYE: CPT

## 2021-12-02 PROCEDURE — 63710000001 ONDANSETRON ODT 4 MG TABLET DISPERSIBLE: Performed by: NURSE PRACTITIONER

## 2021-12-02 PROCEDURE — 25010000002 ORPHENADRINE CITRATE PER 60 MG: Performed by: NURSE PRACTITIONER

## 2021-12-02 PROCEDURE — 72128 CT CHEST SPINE W/O DYE: CPT

## 2021-12-02 PROCEDURE — 71045 X-RAY EXAM CHEST 1 VIEW: CPT

## 2021-12-02 PROCEDURE — 0 HYDROMORPHONE 1 MG/ML SOLUTION: Performed by: NURSE PRACTITIONER

## 2021-12-02 RX ORDER — ONDANSETRON 4 MG/1
4 TABLET, ORALLY DISINTEGRATING ORAL ONCE
Status: COMPLETED | OUTPATIENT
Start: 2021-12-02 | End: 2021-12-02

## 2021-12-02 RX ORDER — CYCLOBENZAPRINE HCL 10 MG
10 TABLET ORAL 2 TIMES DAILY PRN
Qty: 20 TABLET | Refills: 0 | Status: SHIPPED | OUTPATIENT
Start: 2021-12-02 | End: 2021-12-13

## 2021-12-02 RX ORDER — ORPHENADRINE CITRATE 30 MG/ML
60 INJECTION INTRAMUSCULAR; INTRAVENOUS ONCE
Status: COMPLETED | OUTPATIENT
Start: 2021-12-02 | End: 2021-12-02

## 2021-12-02 RX ORDER — NAPROXEN 500 MG/1
500 TABLET ORAL 2 TIMES DAILY WITH MEALS
Qty: 20 TABLET | Refills: 0 | Status: SHIPPED | OUTPATIENT
Start: 2021-12-02 | End: 2021-12-13

## 2021-12-02 RX ADMIN — HYDROMORPHONE HYDROCHLORIDE 1 MG: 1 INJECTION, SOLUTION INTRAMUSCULAR; INTRAVENOUS; SUBCUTANEOUS at 18:28

## 2021-12-02 RX ADMIN — ORPHENADRINE CITRATE 60 MG: 30 INJECTION INTRAMUSCULAR; INTRAVENOUS at 18:28

## 2021-12-02 RX ADMIN — ONDANSETRON 4 MG: 4 TABLET, ORALLY DISINTEGRATING ORAL at 18:28

## 2021-12-03 ENCOUNTER — TELEPHONE (OUTPATIENT)
Dept: FAMILY MEDICINE CLINIC | Facility: CLINIC | Age: 41
End: 2021-12-03

## 2021-12-03 NOTE — DISCHARGE INSTRUCTIONS
Drink plenty of fluid.  Medication as ordered. Can apply heat or tens unit to affected area. Follow up with Pcp - call tomorrow for appointment. Return to ED if condition does not improve or worsens

## 2021-12-03 NOTE — ED PROVIDER NOTES
Subjective   41 yom with PMH of pancreatitis, encephalitis, COPD, HTN, sleep apnea and liver fibrosis presents with c/o mid back pain.  He states around 230 this afternoon he was lifting a logistics bar at work.  He is a  and states this is something he does routinely.  He states he felt a pull in the back and has had pain since that time. He states the pain is worse with movement. He describes it as sharp and shoots down his back at times. He denies saddle anesthesia or bowel/bladder incontinence.  He denies head injury or LOC.   He was sent here by urgent care.           Review of Systems   Constitutional: Negative for activity change, appetite change, fatigue and fever.   HENT: Negative for congestion, ear pain, facial swelling and sore throat.    Eyes: Negative for discharge and visual disturbance.   Respiratory: Negative for apnea, chest tightness, shortness of breath, wheezing and stridor.    Cardiovascular: Negative for chest pain and palpitations.   Gastrointestinal: Negative for abdominal distention, abdominal pain, diarrhea, nausea and vomiting.   Genitourinary: Negative for difficulty urinating and dysuria.   Musculoskeletal: Positive for back pain. Negative for arthralgias and myalgias.   Skin: Negative for rash and wound.   Neurological: Negative for dizziness and seizures.   Psychiatric/Behavioral: Negative for agitation and confusion.       Past Medical History:   Diagnosis Date   • COPD (chronic obstructive pulmonary disease) (HCC)    • Encephalitis    • Hypertension    • Liver fibrosis     stage 3   • Pancreatitis    • Pulmonary hypertension (HCC)     patient states he haad a tralee event while giving blood and went into pulmonary htn   • Sleep apnea     cpap       No Known Allergies    Past Surgical History:   Procedure Laterality Date   • CHOLECYSTECTOMY     • ENDOSCOPY N/A 6/17/2020    Procedure: ESOPHAGOGASTRODUODENOSCOPY WITH ANESTHESIA;  Surgeon: Solo Chirinos MD;  Location:   [facility];  Service: Gastroenterology;  Laterality: N/A;  preop; acid reflux; abdominal pain; difficulty swallowing  postop  Frandy Park MD   • SINUS SURGERY         Family History   Problem Relation Age of Onset   • Colon cancer Father        Social History     Socioeconomic History   • Marital status:      Spouse name: Yudelka   • Number of children: 2   • Years of education: 14   • Highest education level: Some college, no degree   Tobacco Use   • Smoking status: Never Smoker   • Smokeless tobacco: Never Used   Vaping Use   • Vaping Use: Never used   Substance and Sexual Activity   • Alcohol use: No   • Drug use: No   • Sexual activity: Yes     Partners: Female     Birth control/protection: Surgical           Objective   Physical Exam  Vitals and nursing note reviewed.   Constitutional:       Appearance: He is well-developed.   HENT:      Head: Normocephalic.   Eyes:      Pupils: Pupils are equal, round, and reactive to light.   Cardiovascular:      Rate and Rhythm: Normal rate and regular rhythm.      Heart sounds: No murmur heard.      Pulmonary:      Effort: Pulmonary effort is normal.      Breath sounds: Normal breath sounds.   Abdominal:      General: Bowel sounds are normal.      Palpations: Abdomen is soft.   Musculoskeletal:      Cervical back: Normal range of motion and neck supple.      Thoracic back: Tenderness present. No swelling, deformity or spasms. Decreased range of motion.      Lumbar back: No swelling, deformity or tenderness. Normal range of motion.        Back:       Comments: Decreased ROM secondary to pain.  He has +PMS of all extremities.     Skin:     General: Skin is warm and dry.   Neurological:      Mental Status: He is alert and oriented to person, place, and time.         Procedures          No current facility-administered medications for this encounter.    Current Outpatient Medications:   •  albuterol sulfate HFA (ProAir HFA) 108 (90 Base) MCG/ACT inhaler,  "Inhale 2 puffs Every 4 (Four) Hours As Needed for Wheezing., Disp: 8.5 g, Rfl: 2  •  amLODIPine (Norvasc) 10 MG tablet, Take 1 tablet by mouth Daily., Disp: 30 tablet, Rfl: 5  •  cyclobenzaprine (FLEXERIL) 10 MG tablet, Take 1 tablet by mouth 2 (Two) Times a Day As Needed for Muscle Spasms., Disp: 20 tablet, Rfl: 0  •  furosemide (LASIX) 20 MG tablet, Take 1 tablet by mouth 2 (Two) Times a Day., Disp: 180 tablet, Rfl: 1  •  naproxen (NAPROSYN) 500 MG tablet, Take 1 tablet by mouth 2 (Two) Times a Day With Meals for 10 days., Disp: 20 tablet, Rfl: 0  •  potassium chloride 10 MEQ CR tablet, Take 2 tablets by mouth Daily., Disp: 60 tablet, Rfl: 5    Vital signs:  /81   Pulse 81   Temp 98.1 °F (36.7 °C) (Oral)   Resp 18   Ht 180.3 cm (71\")   Wt (!) 152 kg (336 lb)   SpO2 100%   BMI 46.86 kg/m²        ED LAB RESULTS:   Lab Results (last 24 hours)     ** No results found for the last 24 hours. **             IMAGING RESULTS  XR Chest 1 View   Final Result   Impression: No evidence of acute cardiopulmonary disease.   This report was finalized on 12/02/2021 19:24 by Dr. Dillon Royal MD.      CT Thoracic Spine Without Contrast   Final Result   1. Mild wedge shape of T10, T11 and T12 may be developmental or due to   old mild compression fractures. These findings can also be seen in   patients with Scheuermann's disease of the spine of the spine which is   likely given the presence of endplate irregularity and Schmorl's nodes   at multiple mid and lower thoracic spine disc levels.   2. No evidence of acute thoracic spine fracture.   3. Disc narrowing at multiple levels. There is foraminal narrowing at   some of the levels in the mid and lower thoracic spine.       The full report of this exam was immediately signed and available to the   emergency room. The patient is currently in the emergency room.       This report was finalized on 12/02/2021 19:15 by Dr. Juan J Fields MD.      CT Lumbar Spine Without " Contrast   Final Result   1. No evidence of lumbar spine fracture. Minimal wedge shape of T12 may   be developmental or a mild chronic compression deformity.   2. Degenerative changes of the lumbar spine, as described.       The full report of this exam was immediately signed and available to the   emergency room. The patient is currently in the emergency room.   This report was finalized on 12/02/2021 19:09 by Dr. Juan J Fields MD.                       ED Course  ED Course as of 12/03/21 1336   Thu Dec 02, 2021   1951 I discussed the patient's testing results with him.  He is unaware of any previous injury to his thoracic spine.  He will be dc'd home to f/u with PCP.  The patient voiced understanding of both results and instructions. [KS]      ED Course User Index  [KS] Jennifer Monreal, SCOOTER                                                 MDM  Number of Diagnoses or Management Options  Abnormal CT of thoracic spine: new and requires workup  Muscle strain: new and does not require workup     Amount and/or Complexity of Data Reviewed  Tests in the radiology section of CPT®: ordered and reviewed    Risk of Complications, Morbidity, and/or Mortality  Presenting problems: minimal  Diagnostic procedures: minimal  Management options: minimal    Patient Progress  Patient progress: stable      Final diagnoses:   Abnormal CT of thoracic spine   Muscle strain       ED Disposition  ED Disposition     ED Disposition Condition Comment    Discharge Stable           Frandy Park MD  [address] [address]  [phone]    Schedule an appointment as soon as possible for a visit in 1 day  Routine ED follow up         Medication List      New Prescriptions    cyclobenzaprine 10 MG tablet  Commonly known as: FLEXERIL  Take 1 tablet by mouth 2 (Two) Times a Day As Needed for Muscle Spasms.     naproxen 500 MG tablet  Commonly known as: NAPROSYN  Take 1 tablet by mouth 2 (Two) Times a Day With Meals for  10 days.           Where to Get Your Medications      These medications were sent to [facility]  [address], [place] [facility] [address]    Hours: 7AM-5PM Mon-Fri Phone: [phone]   · cyclobenzaprine 10 MG tablet  · naproxen 500 MG tablet          Jennifer Monreal, APRN  12/03/21 5445

## 2021-12-03 NOTE — TELEPHONE ENCOUNTER
Caller: Hernandez Pizarro    Relationship: Self    Best call back number: [phone]    What is the best time to reach you:ANY    Who are you requesting to speak with (clinical staff, provider,  specific staff member): CLINICAL      What was the call regarding: PATIENT IS CALL ABOUT MEDICAL RECORDS FOR CHEST X RAYS AND CT SCANS TAKEN LAST YEAR. HE HAS HAD SOMETHING NEW SHOW UP ON HIS RECENT CHEST X RAY AND CT SCAN. HE HAS VERTEBRAE COMPRESSION FRACTURES AND IS WONDERING IF WE HAVE ACCESS TO COPIES OF THE IMAGING HE HAD DONE LAST YEAR. PLEASE ADVISE.      Do you require a callback:YES

## 2021-12-07 ENCOUNTER — TELEPHONE (OUTPATIENT)
Dept: FAMILY MEDICINE CLINIC | Facility: CLINIC | Age: 41
End: 2021-12-07

## 2021-12-07 NOTE — TELEPHONE ENCOUNTER
Caller: Hernandez Pizarro    Relationship: Self    Best call back number: [phone]    What orders are you requesting (i.e. lab or imaging): WANTS MRI ORDERED FOR HIM    PATIENT REQUESTING CALLBACK FROM PCP TO DISCUSS THIS FURTHER    PATIENT WANTS US TO GIVE  A CALL    BRENDAN LINK  [phone]

## 2021-12-08 ENCOUNTER — OFFICE VISIT (OUTPATIENT)
Dept: FAMILY MEDICINE CLINIC | Facility: CLINIC | Age: 41
End: 2021-12-08

## 2021-12-08 VITALS
DIASTOLIC BLOOD PRESSURE: 84 MMHG | WEIGHT: 315 LBS | HEIGHT: 71 IN | SYSTOLIC BLOOD PRESSURE: 148 MMHG | TEMPERATURE: 98 F | HEART RATE: 77 BPM | BODY MASS INDEX: 44.1 KG/M2 | RESPIRATION RATE: 18 BRPM | OXYGEN SATURATION: 97 %
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
## 2021-12-08 DIAGNOSIS — R93.89 ABNORMAL X-RAY: ICD-10-CM

## 2021-12-08 DIAGNOSIS — M54.6 ACUTE RIGHT-SIDED THORACIC BACK PAIN: ICD-10-CM

## 2021-12-08 PROCEDURE — 99214 OFFICE O/P EST MOD 30 MIN: CPT | Performed by: FAMILY MEDICINE

## 2021-12-08 NOTE — PROGRESS NOTES
Subjective   Hernandez Pizarro is a 41 y.o. male presenting with chief complaint of:   Chief Complaint   Patient presents with   • Back Pain     f/u ED        History of Present Illness :  Alone.  Here for primarily an acute issue mid back pain.  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 This was 12.2.21.   He got little better but then came back later particularly when he was lifting working overhead.  He went to [facility] ER; 12/8/2021 with no admittable findings but minor abnormalities were CT of thoracic spine of some compressive characteristics of several vertebra that was uncertain whether chronic or new.  The pain is not in the midline and off to the right side.  It is worse with activity.  There is no rash develop.  No weakness of upper or lower extremities or numbness associated.    Has multiple chronic problems to consider that might have a bearing on today's issues; not an interval appointment.       Chronic/acute problems reviewed today:   1. Acute right-sided thoracic back pain: see above   2. Abnormal x-ray -see below   3. Work place accident      Has an/another acute issue today: none.    The following portions of the patient's history were reviewed and updated as appropriate: allergies, current medications, past family history, past medical history, past social history, past surgical history and problem list.      Current Outpatient Medications:   •  amLODIPine (Norvasc) 10 MG tablet, Take 1 tablet by mouth Daily., Disp: 30 tablet, Rfl: 5  •  cyclobenzaprine (FLEXERIL) 10 MG tablet, Take 1 tablet by mouth 2 (Two) Times a Day As Needed for Muscle Spasms., Disp: 20 tablet, Rfl: 0  •  furosemide (LASIX) 20 MG tablet, Take 1 tablet by mouth 2 (Two) Times a Day., Disp: 180 tablet, Rfl: 1  •  naproxen (NAPROSYN) 500 MG tablet, Take 1 tablet by mouth 2 (Two) Times a Day With Meals for 10 days., Disp:  20 tablet, Rfl: 0  •  potassium chloride 10 MEQ CR tablet, Take 2 tablets by mouth Daily., Disp: 60 tablet, Rfl: 5  •  albuterol sulfate HFA (ProAir HFA) 108 (90 Base) MCG/ACT inhaler, Inhale 2 puffs Every 4 (Four) Hours As Needed for Wheezing., Disp: 8.5 g, Rfl: 2    No problems with medications.    No Known Allergies    Review of Systems   Constitutional: Positive for activity change. Negative for appetite change, chills, fatigue and fever.   Respiratory: Negative for shortness of breath and wheezing.    Cardiovascular: Negative for chest pain and palpitations.   Gastrointestinal: Negative for abdominal pain, blood in stool, constipation, diarrhea, nausea and vomiting.   Genitourinary: Negative for dysuria and flank pain.   Skin: Negative for rash.   Neurological: Negative for weakness and numbness.     Screening:  Mammogram: NA  Bone density: NA  Low dose CT chest: Tobacco-smoker/none  GI: NA  Prostate: NA  Usual lab order  NA    Data reviewed:   Recent admit/ER/MD visits: above  Last cardiac testing:   Echo:   Results for orders placed during the hospital encounter of 06/04/20    Adult Stress Echo W/ Cont or Stress Agent if Necessary Per Protocol    Interpretation Summary  · Left ventricular systolic function is normal.    LOW RISK FOR ISCHEMIA    CT Thoracic Spine Without Contrast    Result Date: 12/2/2021  1. Mild wedge shape of T10, T11 and T12 may be developmental or due to old mild compression fractures. These findings can also be seen in patients with Scheuermann's disease of the spine of the spine which is likely given the presence of endplate irregularity and Schmorl's nodes at multiple mid and lower thoracic spine disc levels. 2. No evidence of acute thoracic spine fracture. 3. Disc narrowing at multiple levels. There is foraminal narrowing at some of the levels in the mid and lower thoracic spine.  The full report of this exam was immediately signed and available to the emergency room. The patient is  currently in the emergency room.  This report was finalized on 12/02/2021 19:15 by Dr. Juan J Fields MD.    CT Lumbar Spine Without Contrast    Result Date: 12/2/2021  1. No evidence of lumbar spine fracture. Minimal wedge shape of T12 may be developmental or a mild chronic compression deformity. 2. Degenerative changes of the lumbar spine, as described.  The full report of this exam was immediately signed and available to the emergency room. The patient is currently in the emergency room. This report was finalized on 12/02/2021 19:09 by Dr. Juan J Fields MD.    XR Chest 1 View    Result Date: 12/2/2021  Impression: No evidence of acute cardiopulmonary disease. This report was finalized on 12/02/2021 19:24 by Dr. Dillon Royal MD.      Lab Results:  Results for orders placed or performed in visit on 08/23/21   COVID-19,[facility] ROUTINE, NP/OP SWAB IN TRANSPORT MEDIA OR ESWAB 72 HR TAT - ,   Result Value Ref Range    SARS-CoV-2, ELISABETH Not Detected Not Detected   SARS-CoV-2, ELISABETH 2 DAY TAT - ,   Result Value Ref Range    [facility] SARS-COV-2, ELISABETH 2 DAY TAT Performed        A1C:  Lab Results - Last 18 Months   Lab Units 07/28/21  1016 06/11/20  0901   HEMOGLOBIN A1C % 5.30 5.61*     CBC:  Lab Results - Last 18 Months   Lab Units 07/28/21  1016 06/11/20  0901   WBC 10*3/mm3 6.54  --    HEMOGLOBIN g/dL 15.6  --    HEMATOCRIT % 47.2  --    PLATELETS 10*3/mm3 250  --    IRON mcg/dL  --  96      BMP/CMP:  Lab Results - Last 18 Months   Lab Units 07/28/21  1016 10/07/20  0649 07/07/20  0625   SODIUM mmol/L 141 143 137   POTASSIUM mmol/L 4.4 4.1 4.0   CHLORIDE mmol/L 103 106 103   TOTAL CO2 mmol/L 27.0  --   --    CO2 mmol/L  --  29.6* 24.2   BUN mg/dL 18 18 24*   CREATININE mg/dL 0.91 0.92 1.00   EGFR IF NONAFRICN AM mL/min/1.73 92 91 83   EGFR IF AFRICN AM mL/min/1.73 112  --   --    CALCIUM mg/dL 9.5 9.2 9.3     HEPATIC:  Lab Results - Last 18 Months   Lab Units 07/28/21  1016 07/07/20  0625 06/11/20  0901   ALT (SGPT) U/L  "30 107* 78*   AST (SGOT) U/L 26 54* 46*   ALK PHOS U/L 133* 140* 119*     THYROID:  Lab Results - Last 18 Months   Lab Units 07/28/21  1016   TSH uIU/mL 1.850       Objective   /84   Pulse 77   Temp 98 °F (36.7 °C)   Resp 18   Ht 180.3 cm (71\")   Wt (!) 152 kg (336 lb)   SpO2 97%   BMI 46.86 kg/m²   Body mass index is 46.86 kg/m².    Recent Vitals       12/2/2021 12/2/2021 12/8/2021       BP: 150/81 147/81 148/84     Pulse: 82 81 77     Temp: 97.9 °F (36.6 °C) 98.1 °F (36.7 °C) 98 °F (36.7 °C)     Weight: -- -- 152 kg (336 lb)     BMI (Calculated): -- -- 46.9         Physical Exam  GENERAL:  Well nourished/developed in no acute distress.   SKIN: Turgor excellent, without wound, rash, lesion.  HEENT: Normal cephalic without trauma.  Pupils equal round reactive to light. Extraocular motions full without nystagmus.     Oral cavity without growths, exudates, and moist.  Posterior pharynx without mass, obstruction, redness.  No thyromegaly, mass, tenderness, lymphadenopathy and supple.  CV: Regular rhythm.  No murmur, gallop, edema. Posterior pulses intact.  No carotid bruits.  CHEST: No chest wall tenderness or mass.   LUNGS: Symmetric motion with clear to auscultation.    ABD: Soft, nontender without mass.   ORTHO: Symmetric extremities without swelling/point tenderness.  Full gross range of motion.  NEURO: CN 2-12 grossly intact.  Symmetric facies and UE/LE. 3-4/5 strength throughout. 1/4 x bicep equal reflexes.  Nonfocal use extremities. Speech clear. Intact light touch with monofilament, vibratory sensation with tuning fork; equal toes/distal feet.    PSYCH: Oriented x 3.  Pleasant calm, well kept.  Purposeful/directed conservation with intact short/long gross memory.     Assessment/Plan     1. Acute right-sided thoracic back pain    2. Abnormal x-ray    3. Work place accident        Discussion:  BP alittle high; thankfully asymptomatic and there will follow  DM/BS ok last  Thyroid ok last  Liver stable " last  CBC ok last  Renal ok last  Lipid stable last  PSA NA    Work release: off 2w  No lifting > 10#, lifting, bending, walking-standing > 15 min    Vaccine reviewed: today none; later complete covid- covid booster, seasonal flu, Tdap  Screening reviewed/updated    Medical decision issues:   Data review above:   Rx: reviewed and decisions:   Same Rx for now     Visit today involved chronic significant medical problems or differentials and/or intensive drug monitoring: ie potential to cause serious morbidity or death:   No orders of the defined types were placed in this encounter.      Orders placed:   LAB/Testing/Referrals: reviewed/orders:   Today:   Orders Placed This Encounter   Procedures   • MRI thoracic spine wo contrast     Chronic/recurrent labs above or change to:   same     Health maintenance:   Body mass index is 46.86 kg/m².  Patient's Body mass index is 46.86 kg/m². indicating that he is morbidly obese (BMI > 40 or > 35 with obesity - related health condition). Obesity-related health conditions include the following: hypertension. Obesity is unchanged. BMI is is above average; BMI management plan is completed. We discussed when back to able..    Tobacco use reviewed:   Hernandez Pizarro  reports that he has never smoked. He has never used smokeless tobacco..      There are no Patient Instructions on file for this visit.    Follow up: Return for will see how testing/or treatment goes and decide;, THEN, lab;, Dr Park-, as planned;.  Future Appointments   Date Time Provider Department Center   12/11/2021  8:00 AM [facility] 2  [facility]   8/1/2022  9:00 AM Frandy Park MD [facility]

## 2021-12-11 ENCOUNTER — HOSPITAL ENCOUNTER (OUTPATIENT)
Dept: MRI IMAGING | Facility: HOSPITAL | Age: 41
Discharge: HOME | End: 2021-12-11
Admitting: FAMILY MEDICINE

## 2021-12-11 DIAGNOSIS — R93.89 ABNORMAL X-RAY: ICD-10-CM

## 2021-12-11 DIAGNOSIS — M54.6 ACUTE RIGHT-SIDED THORACIC BACK PAIN: ICD-10-CM

## 2021-12-11 PROCEDURE — 72146 MRI CHEST SPINE W/O DYE: CPT

## 2021-12-13 DIAGNOSIS — R93.89 ABNORMAL X-RAY: ICD-10-CM

## 2021-12-13 DIAGNOSIS — M54.6 ACUTE RIGHT-SIDED THORACIC BACK PAIN: Primary | ICD-10-CM

## 2021-12-16 ENCOUNTER — OFFICE VISIT (OUTPATIENT)
Dept: FAMILY MEDICINE CLINIC | Facility: CLINIC | Age: 41
End: 2021-12-16

## 2021-12-16 VITALS
DIASTOLIC BLOOD PRESSURE: 86 MMHG | TEMPERATURE: 98 F | WEIGHT: 315 LBS | BODY MASS INDEX: 44.1 KG/M2 | RESPIRATION RATE: 18 BRPM | OXYGEN SATURATION: 98 % | HEART RATE: 77 BPM | HEIGHT: 71 IN | SYSTOLIC BLOOD PRESSURE: 140 MMHG
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
## 2021-12-16 DIAGNOSIS — R93.89 ABNORMAL X-RAY: ICD-10-CM

## 2021-12-16 DIAGNOSIS — M54.6 ACUTE RIGHT-SIDED THORACIC BACK PAIN: ICD-10-CM

## 2021-12-16 PROCEDURE — 99213 OFFICE O/P EST LOW 20 MIN: CPT | Performed by: FAMILY MEDICINE

## 2021-12-16 NOTE — PROGRESS NOTES
Subjective   Hernandez Pizarro is a 41 y.o. male presenting with chief complaint of:   Chief Complaint   Patient presents with   • Back Pain     Pt needs work excuse   Answers for HPI/ROS submitted by the patient on 12/16/2021  What is the primary reason for your visit?: Back Pain    History of Present Illness :  Alone.  Here for primarily f/u last visit with acute mid back pain.  ON/off sore since; nothing as severe as day of onset.  Last visit on CT there was some concurrent concern that he had compressive changes of several vertebra; leaving to some uncertainty if there was a acute compression fracture.  He has developed no lower extremity weakness numbness or difficulty controlling bowel or bladder       Has multiple chronic problems to consider that might have a bearing on today's issues; not an interval appointment.       Chronic/acute problems reviewed today:   1. Work place accident    2. Acute right-sided thoracic back pain    3. Abnormal x-ray      Has an/another acute issue today: none.    The following portions of the patient's history were reviewed and updated as appropriate: allergies, current medications, past family history, past medical history, past social history, past surgical history and problem list.      Current Outpatient Medications:   •  amLODIPine (Norvasc) 10 MG tablet, Take 1 tablet by mouth Daily., Disp: 30 tablet, Rfl: 5  •  furosemide (LASIX) 20 MG tablet, Take 1 tablet by mouth 2 (Two) Times a Day. (Patient taking differently: Take 20 mg by mouth Daily.), Disp: 180 tablet, Rfl: 1  •  potassium chloride 10 MEQ CR tablet, Take 2 tablets by mouth Daily. (Patient taking differently: Take 20 mEq by mouth Daily. 1 tablet daily), Disp: 60 tablet, Rfl: 5  •  albuterol sulfate HFA (ProAir HFA) 108 (90 Base) MCG/ACT inhaler, Inhale 2 puffs Every 4 (Four) Hours As Needed for Wheezing., Disp: 8.5 g, Rfl: 2    No problems with medications.    No Known Allergies    Review of Systems  Constitutional:  Positive for activity change. Negative for appetite change, chills, fatigue and fever.   Respiratory: Negative for shortness of breath and wheezing.    Cardiovascular: Negative for chest pain and palpitations.   Gastrointestinal: Negative for abdominal pain, blood in stool, constipation, diarrhea, nausea and vomiting.   Genitourinary: Negative for dysuria and flank pain.   Skin: Negative for rash.   Neurological: Negative for weakness and numbness.      Screening:  Mammogram: NA  Bone density: NA  Low dose CT chest: Tobacco-smoker/none  GI: NA  Prostate: NA  Usual lab order  NA    Data reviewed:   Recent admit/ER/MD visits:     Last cardiac testing:   Echo:   Results for orders placed during the hospital encounter of 06/04/20    Adult Stress Echo W/ Cont or Stress Agent if Necessary Per Protocol    Interpretation Summary  · Left ventricular systolic function is normal.    LOW RISK FOR ISCHEMIA    MRI Thoracic Spine Without Contrast    Result Date: 12/11/2021  Mild chronic degenerative changes of the thoracic spine without acute osseous abnormality identified. No evidence of severe thecal sac stenosis or neuroforaminal narrowing. This report was finalized on 12/11/2021 10:21 by Dr. Sergio Killian MD.    Lab Results:  Results for orders placed or performed in visit on 08/23/21   COVID-19,[facility] ROUTINE, NP/OP SWAB IN TRANSPORT MEDIA OR ESWAB 72 HR TAT - ,   Result Value Ref Range    SARS-CoV-2, [facility] Not Detected Not Detected   SARS-CoV-2, [facility] 2 DAY TAT - ,   Result Value Ref Range    [facility] SARS-COV-2, [facility] 2 DAY TAT Performed        A1C:  Lab Results - Last 18 Months   Lab Units 07/28/21  1016   HEMOGLOBIN A1C % 5.30     GLUCOSE:  Lab Results - Last 18 Months   Lab Units 07/28/21  1016 10/07/20  0649 07/07/20  0625   GLUCOSE mg/dL 92 77 79     LIPID:No results for input(s): CHLPL, LDL, HDL, TRIG in the last 34258 hours.  PSA:No results for input(s): PSA in the last 21234 hours.  CBC:  Lab Results - Last 18 Months    Lab Units 07/28/21  1016   WBC 10*3/mm3 6.54   HEMOGLOBIN g/dL 15.6   HEMATOCRIT % 47.2   PLATELETS 10*3/mm3 250      BMP/CMP:  Lab Results - Last 18 Months   Lab Units 07/28/21  1016 10/07/20  0649 07/07/20  0625   SODIUM mmol/L 141 143 137   POTASSIUM mmol/L 4.4 4.1 4.0   CHLORIDE mmol/L 103 106 103   TOTAL CO2 mmol/L 27.0  --   --    CO2 mmol/L  --  29.6* 24.2   GLUCOSE mg/dL 92 77 79   BUN mg/dL 18 18 24*   CREATININE mg/dL 0.91 0.92 1.00   EGFR IF NONAFRICN AM mL/min/1.73 92 91 83   EGFR IF AFRICN AM mL/min/1.73 112  --   --    CALCIUM mg/dL 9.5 9.2 9.3     HEPATIC:  Lab Results - Last 18 Months   Lab Units 07/28/21  1016 07/07/20  0625   ALT (SGPT) U/L 30 107*   AST (SGOT) U/L 26 54*   ALK PHOS U/L 133* 140*     THYROID:  Lab Results - Last 18 Months   Lab Units 07/28/21  1016   TSH uIU/mL 1.850     CT Thoracic Spine Without Contrast    Result Date: 12/2/2021  1. Mild wedge shape of T10, T11 and T12 may be developmental or due to old mild compression fractures. These findings can also be seen in patients with Scheuermann's disease of the spine of the spine which is likely given the presence of endplate irregularity and Schmorl's nodes at multiple mid and lower thoracic spine disc levels. 2. No evidence of acute thoracic spine fracture. 3. Disc narrowing at multiple levels. There is foraminal narrowing at some of the levels in the mid and lower thoracic spine.  The full report of this exam was immediately signed and available to the emergency room. The patient is currently in the emergency room.  This report was finalized on 12/02/2021 19:15 by Dr. Juan J Fields MD.    CT Lumbar Spine Without Contrast    Result Date: 12/2/2021  1. No evidence of lumbar spine fracture. Minimal wedge shape of T12 may be developmental or a mild chronic compression deformity. 2. Degenerative changes of the lumbar spine, as described.  The full report of this exam was immediately signed and available to the emergency room. The  "patient is currently in the emergency room. This report was finalized on 12/02/2021 19:09 by Dr. Juan J Fields MD.    MRI Thoracic Spine Without Contrast    Result Date: 12/11/2021  Mild chronic degenerative changes of the thoracic spine without acute osseous abnormality identified. No evidence of severe thecal sac stenosis or neuroforaminal narrowing. This report was finalized on 12/11/2021 10:21 by Dr. Sergio Killian MD.    XR Chest 1 View    Result Date: 12/2/2021  Impression: No evidence of acute cardiopulmonary disease. This report was finalized on 12/02/2021 19:24 by Dr. Dillon Royal MD.    Objective   /86   Pulse 77   Temp 98 °F (36.7 °C) (Infrared)   Resp 18   Ht 180.3 cm (71\")   Wt (!) 156 kg (344 lb)   SpO2 98%   BMI 47.98 kg/m²   Body mass index is 47.98 kg/m².    Recent Vitals       12/2/2021 12/8/2021 12/16/2021       BP: 147/81 148/84 140/86     Pulse: 81 77 77     Temp: 98.1 °F (36.7 °C) 98 °F (36.7 °C) 98 °F (36.7 °C)     Weight: -- 152 kg (336 lb) 156 kg (344 lb)     BMI (Calculated): -- 46.9 48           Physical Exam  Vitals reviewed.   Constitutional:       General: He is not in acute distress.     Appearance: He is obese.   HENT:      Head: Normocephalic and atraumatic.      Nose: Nose normal.      Mouth/Throat:      Mouth: Mucous membranes are moist.   Eyes:      Extraocular Movements: Extraocular movements intact.      Conjunctiva/sclera: Conjunctivae normal.      Pupils: Pupils are equal, round, and reactive to light.   Cardiovascular:      Rate and Rhythm: Normal rate and regular rhythm.      Heart sounds: Normal heart sounds.   Pulmonary:      Effort: Pulmonary effort is normal.      Breath sounds: Normal breath sounds.   Abdominal:      Palpations: Abdomen is soft.   Musculoskeletal:         General: No swelling, tenderness, deformity or signs of injury. Normal range of motion.      Cervical back: Neck supple. No tenderness.      Right lower leg: No edema.      Left lower " "leg: No edema.   Lymphadenopathy:      Cervical: No cervical adenopathy.   Skin:     General: Skin is warm and dry.      Findings: No rash.   Neurological:      Mental Status: He is alert and oriented to person, place, and time.      Sensory: No sensory deficit.      Gait: Gait normal.      Deep Tendon Reflexes: Reflexes normal.       Assessment/Plan     1. Work place accident    2. Acute right-sided thoracic back pain    3. Abnormal x-ray      Discussion:  BP ok    Requirement for return to work are excessive  With his new/sudden and significant back pain would like review of imaging/story by neurosurgery and their opinion on his ability for his line of work  Letter written; ok \"light\" duty if can hand lower weight limits    Vaccine reviewed: today none; advised Tdap, flu (those missing)  Screening reviewed/updated as noted    Medical decision issues:   Data review above:   Rx: reviewed and decisions:   Same Rx for now     Visit today involved chronic significant medical problems or differentials and/or intensive drug monitoring: ie potential to cause serious morbidity or death:   No orders of the defined types were placed in this encounter.      Orders placed:   LAB/Testing/Referrals: reviewed/orders:   Today:   No orders of the defined types were placed in this encounter.    Chronic/recurrent labs above or change to:   same     Health maintenance:   Body mass index is 47.98 kg/m².  Patient's Body mass index is 47.98 kg/m². indicating that he is morbidly obese (BMI > 40 or > 35 with obesity - related health condition). Obesity-related health conditions include the following: risk back pain. Obesity is unchanged. BMI is is above average; BMI management plan is completed. We discussed portion control and increasing exercise..      Tobacco use reviewed:   Hernandez TAVON Pizarro  reports that he has never smoked. He has never used smokeless tobacco..    There are no Patient Instructions on file for this visit.    Follow up: " Return for lab;, Dr Park-, as planned;.  Future Appointments   Date Time Provider Department Center   8/1/2022  9:00 AM Frandy Park MD [facility]

## 2021-12-20 ENCOUNTER — TELEPHONE (OUTPATIENT)
Dept: NEUROSURGERY | Facility: CLINIC | Age: 41
End: 2021-12-20

## 2021-12-20 NOTE — TELEPHONE ENCOUNTER
REC MESS FROM W/COMP FOR AUTH FOR OV - LVM THAT WE NEED WRITTEN AUTH TO SCAN IN CHART.  NEEDS TO LIST CLAIM #, DOI, ADDRESS TO MAIL CLAIMS AND  INFO.   HAVE HER MY FAX NUMBER AND PHONE NO IF ANY QUESTIONS.

## 2021-12-20 NOTE — TELEPHONE ENCOUNTER
Provider: YULY  Caller: JC  Relationship to Patient: WORK COMP REP  Pharmacy:   Phone Number: [phone]  Reason for Call: CALLED TO GIVE AUTH FOR PT TO BE SEEN.      [id number]  DATE OF INJURY: 12/2/2021

## 2021-12-21 ENCOUNTER — TELEPHONE (OUTPATIENT)
Dept: FAMILY MEDICINE CLINIC | Facility: CLINIC | Age: 41
End: 2021-12-21

## 2021-12-21 NOTE — TELEPHONE ENCOUNTER
"Per Dr Park     \"Call progress report 29th; will write note then\"    Called pt and advised and stated understanding      " NAUSEA

## 2021-12-21 NOTE — TELEPHONE ENCOUNTER
Will they not allow light duty  Is he willing to accept the risk he could have a reinjury?     Caller: Hernandez Pizarro    Relationship: Self    Best call back number: [phone]    What is the best time to reach you: ANY TIME    Who are you requesting to speak with (clinical staff, provider,  specific staff member): DR OCHOA OR NURSE    Do you know the name of the person who called: SELF    What was the call regarding: PATIENT HAS BEEN OFF WORK UNTIL 12/30 AND HIS APPOINTMENT WITH NEUROSURGERY ISN'T UNTIL 01/13/2022 AND WANTS TO MAKE SURE IT IS STILL OK FOR HIM TO GO BACK TO WORK ON 12/30/2021? PATIENT HAS NOT HAD ANY SHARP PAIN FOR ABOUT A WEEK NOW. SYMPTOMS HAVE IMPROVED. PLEASE ADVISE    Do you require a callback: YES

## 2021-12-28 ENCOUNTER — TELEPHONE (OUTPATIENT)
Dept: FAMILY MEDICINE CLINIC | Facility: CLINIC | Age: 41
End: 2021-12-28

## 2021-12-28 NOTE — TELEPHONE ENCOUNTER
Caller: Hernandez Pizarro    Relationship to patient: Self    Best call back number: [phone]    Patient is needing to confirm that the workmans comp paperwork that was faxed to the office was received today. Please advise.

## 2022-01-07 ENCOUNTER — TELEPHONE (OUTPATIENT)
Dept: FAMILY MEDICINE CLINIC | Facility: CLINIC | Age: 42
End: 2022-01-07

## 2022-01-07 NOTE — TELEPHONE ENCOUNTER
Called patient; confirmed he needs return to work letter despite form completed yesterday      Caller: Hernandez Pizarro    Relationship: Self    Best call back number: [phone] (H)    What form or medical record are you requesting: DRS NOTE STATING HE CAN RETURN TO WORK WITH OUT ANY RESTRICTIONS     Who is requesting this form or medical record from you:     How would you like to receive the form or medical records (pick-up, mail, fax): FAX [phone]   If fax, what is the fax number: [phone]  If mail, what is the address:   If pick-up, provide patient with address and location details    Timeframe paperwork needed: SOON     Additional notes:

## 2022-01-07 NOTE — LETTER
January 6, 2022       No Recipients    Patient: Hernandez Pizarro   YOB: 1980   Date of Visit: 1/6/2022       To whom it concerns:     Hernandez Pizarro may return to usual work activities without restrictions.     If you have questions, please do not hesitate to call me.          Sincerely,        Frandy Park MD        CC:   No Recipients  Frandy Park MD  01/07/22 0900  Incomplete  Called patient; confirmed he needs return to work letter despite form completed yesterday      Caller: Hernandez Pizarro    Relationship: Self    Best call back number: [phone] (H)    What form or medical record are you requesting: DRS NOTE STATING HE CAN RETURN TO WORK WITH OUT ANY RESTRICTIONS     Who is requesting this form or medical record from you:     How would you like to receive the form or medical records (pick-up, mail, fax): FAX [phone]   If fax, what is the fax number: [phone]  If mail, what is the address:   If pick-up, provide patient with address and location details    Timeframe paperwork needed: SOON     Additional notes:           Nic Pfefifer RegSched Rep  01/07/22 0838  Signed      Caller: Hernandez Pizarro    Relationship: Self    Best call back number: [phone] (H)    What form or medical record are you requesting: DRS NOTE STATING HE CAN RETURN TO WORK WITH OUT ANY RESTRICTIONS     Who is requesting this form or medical record from you:     How would you like to receive the form or medical records (pick-up, mail, fax): FAX [phone]   If fax, what is the fax number: [phone]  If mail, what is the address:   If pick-up, provide patient with address and location details    Timeframe paperwork needed: SOON     Additional notes:

## 2022-01-13 ENCOUNTER — OFFICE VISIT (OUTPATIENT)
Dept: NEUROSURGERY | Facility: CLINIC | Age: 42
End: 2022-01-13

## 2022-01-13 ENCOUNTER — PATIENT ROUNDING (BHMG ONLY) (OUTPATIENT)
Dept: NEUROSURGERY | Facility: CLINIC | Age: 42
End: 2022-01-13

## 2022-01-13 VITALS
WEIGHT: 315 LBS | SYSTOLIC BLOOD PRESSURE: 130 MMHG | HEIGHT: 71 IN | BODY MASS INDEX: 44.1 KG/M2 | DIASTOLIC BLOOD PRESSURE: 90 MMHG

## 2022-01-13 DIAGNOSIS — S29.012A STRAIN OF THORACIC SPINE: Primary | ICD-10-CM

## 2022-01-13 DIAGNOSIS — E66.01 MORBID OBESITY WITH BMI OF 45.0-49.9, ADULT: ICD-10-CM

## 2022-01-13 DIAGNOSIS — Z78.9 NON-SMOKER: ICD-10-CM

## 2022-01-13 PROCEDURE — 99213 OFFICE O/P EST LOW 20 MIN: CPT | Performed by: NURSE PRACTITIONER

## 2022-01-13 NOTE — PATIENT INSTRUCTIONS
"BMI for Adults  What is BMI?  Body mass index (BMI) is a number that is calculated from a person's weight and height. BMI can help estimate how much of a person's weight is composed of fat. BMI does not measure body fat directly. Rather, it is an alternative to procedures that directly measure body fat, which can be difficult and expensive.  BMI can help identify people who may be at higher risk for certain medical problems.  What are BMI measurements used for?  BMI is used as a screening tool to identify possible weight problems. It helps determine whether a person is obese, overweight, a healthy weight, or underweight.  BMI is useful for:  · Identifying a weight problem that may be related to a medical condition or may increase the risk for medical problems.  · Promoting changes, such as changes in diet and exercise, to help reach a healthy weight. BMI screening can be repeated to see if these changes are working.  How is BMI calculated?  BMI involves measuring your weight in relation to your height. Both height and weight are measured, and the BMI is calculated from those numbers. This can be done either in English ([place] or metric measurements. Note that charts and online BMI calculators are available to help you find your BMI quickly and easily without having to do these calculations yourself.  To calculate your BMI in English ([place] measurements:    1. Measure your weight in pounds (lb).  2. Multiply the number of pounds by 703.  ? For example, for a person who weighs 180 lb, multiply that number by 703, which equals 126,540.  3. Measure your height in inches. Then multiply that number by itself to get a measurement called \"inches squared.\"  ? For example, for a person who is 70 inches tall, the \"inches squared\" measurement is 70 inches x 70 inches, which equals 4,900 inches squared.  4. Divide the total from step 2 (number of lb x 703) by the total from step 3 (inches squared): 126,540 ÷ 4,900 = 25.8. This is " "your BMI.    To calculate your BMI in metric measurements:  1. Measure your weight in kilograms (kg).  2. Measure your height in meters (m). Then multiply that number by itself to get a measurement called \"meters squared.\"  ? For example, for a person who is 1.75 m tall, the \"meters squared\" measurement is 1.75 m x 1.75 m, which is equal to 3.1 meters squared.  3. Divide the number of kilograms (your weight) by the meters squared number. In this example: 70 ÷ 3.1 = 22.6. This is your BMI.  What do the results mean?  BMI charts are used to identify whether you are underweight, normal weight, overweight, or obese. The following guidelines will be used:  · Underweight: BMI less than 18.5.  · Normal weight: BMI between 18.5 and 24.9.  · Overweight: BMI between 25 and 29.9.  · Obese: BMI of 30 or above.  Keep these notes in mind:  · Weight includes both fat and muscle, so someone with a muscular build, such as an athlete, may have a BMI that is higher than 24.9. In cases like these, BMI is not an accurate measure of body fat.  · To determine if excess body fat is the cause of a BMI of 25 or higher, further assessments may need to be done by a health care provider.  · BMI is usually interpreted in the same way for men and women.  Where to find more information  For more information about BMI, including tools to quickly calculate your BMI, go to these websites:  · Centers for Disease Control and Prevention: www.cdc.gov  · American Heart Association: www.heart.org  · National Heart, Lung, and Blood [place]: www.nhlbi.nih.gov  Summary  · Body mass index (BMI) is a number that is calculated from a person's weight and height.  · BMI may help estimate how much of a person's weight is composed of fat. BMI can help identify those who may be at higher risk for certain medical problems.  · BMI can be measured using English measurements or metric measurements.  · BMI charts are used to identify whether you are underweight, normal " "weight, overweight, or obese.  This information is not intended to replace advice given to you by your health care provider. Make sure you discuss any questions you have with your health care provider.  Document Revised: 09/09/2020 Document Reviewed: 07/17/2020  [employer] Patient Education © 2021 [employer]      https://www.nhlbi.nih.gov/files/docs/public/heart/dash_brief.pdf\">   DASH Eating Plan  DASH stands for Dietary Approaches to Stop Hypertension. The DASH eating plan is a healthy eating plan that has been shown to:  · Reduce high blood pressure (hypertension).  · Reduce your risk for type 2 diabetes, heart disease, and stroke.  · Help with weight loss.  What are tips for following this plan?  Reading food labels  · Check food labels for the amount of salt (sodium) per serving. Choose foods with less than 5 percent of the Daily Value of sodium. Generally, foods with less than 300 milligrams (mg) of sodium per serving fit into this eating plan.  · To find whole grains, look for the word \"whole\" as the first word in the ingredient list.  Shopping  · Buy products labeled as \"low-sodium\" or \"no salt added.\"  · Buy fresh foods. Avoid canned foods and pre-made or frozen meals.  Cooking  · Avoid adding salt when cooking. Use salt-free seasonings or herbs instead of table salt or sea salt. Check with your health care provider or pharmacist before using salt substitutes.  · Do not cortés foods. Cook foods using healthy methods such as baking, boiling, grilling, roasting, and broiling instead.  · Cook with heart-healthy oils, such as olive, canola, avocado, soybean, or sunflower oil.  Meal planning    · Eat a balanced diet that includes:  ? 4 or more servings of fruits and 4 or more servings of vegetables each day. Try to fill one-half of your plate with fruits and vegetables.  ? 6-8 servings of whole grains each day.  ? Less than 6 oz (170 g) of lean meat, poultry, or fish each day. A 3-oz (85-g) serving of meat is " about the same size as a deck of cards. One egg equals 1 oz (28 g).  ? 2-3 servings of low-fat dairy each day. One serving is 1 cup (237 mL).  ? 1 serving of nuts, seeds, or beans 5 times each week.  ? 2-3 servings of heart-healthy fats. Healthy fats called omega-3 fatty acids are found in foods such as walnuts, flaxseeds, fortified milks, and eggs. These fats are also found in cold-water fish, such as sardines, salmon, and mackerel.  · Limit how much you eat of:  ? Canned or prepackaged foods.  ? Food that is high in trans fat, such as some fried foods.  ? Food that is high in saturated fat, such as fatty meat.  ? Desserts and other sweets, sugary drinks, and other foods with added sugar.  ? Full-fat dairy products.  · Do not salt foods before eating.  · Do not eat more than 4 egg yolks a week.  · Try to eat at least 2 vegetarian meals a week.  · Eat more home-cooked food and less restaurant, buffet, and fast food.    Lifestyle  · When eating at a restaurant, ask that your food be prepared with less salt or no salt, if possible.  · If you drink alcohol:  ? Limit how much you use to:  § 0-1 drink a day for women who are not pregnant.  § 0-2 drinks a day for men.  ? Be aware of how much alcohol is in your drink. In the [place] one drink equals one 12 oz bottle of beer (355 mL), one 5 oz glass of wine (148 mL), or one 1½ oz glass of hard liquor (44 mL).  General information  · Avoid eating more than 2,300 mg of salt a day. If you have hypertension, you may need to reduce your sodium intake to 1,500 mg a day.  · Work with your health care provider to maintain a healthy body weight or to lose weight. Ask what an ideal weight is for you.  · Get at least 30 minutes of exercise that causes your heart to beat faster (aerobic exercise) most days of the week. Activities may include walking, swimming, or biking.  · Work with your health care provider or dietitian to adjust your eating plan to your individual calorie needs.  What  foods should I eat?  Fruits  All fresh, dried, or frozen fruit. Canned fruit in natural juice (without added sugar).  Vegetables  Fresh or frozen vegetables (raw, steamed, roasted, or grilled). Low-sodium or reduced-sodium tomato and vegetable juice. Low-sodium or reduced-sodium tomato sauce and tomato paste. Low-sodium or reduced-sodium canned vegetables.  Grains  Whole-grain or whole-wheat bread. Whole-grain or whole-wheat pasta. Brown rice. Oatmeal. Quinoa. Bulgur. Whole-grain and low-sodium cereals. Cheyanne bread. Low-fat, low-sodium crackers. Whole-wheat flour tortillas.  Meats and other proteins  Skinless chicken or turkey. Ground chicken or turkey. Pork with fat trimmed off. Fish and seafood. Egg whites. Dried beans, peas, or lentils. Unsalted nuts, nut butters, and seeds. Unsalted canned beans. Lean cuts of beef with fat trimmed off. Low-sodium, lean precooked or cured meat, such as sausages or meat loaves.  Dairy  Low-fat (1%) or fat-free (skim) milk. Reduced-fat, low-fat, or fat-free cheeses. Nonfat, low-sodium ricotta or cottage cheese. Low-fat or nonfat yogurt. Low-fat, low-sodium cheese.  Fats and oils  Soft margarine without trans fats. Vegetable oil. Reduced-fat, low-fat, or light mayonnaise and salad dressings (reduced-sodium). Canola, safflower, olive, avocado, soybean, and sunflower oils. Avocado.  Seasonings and condiments  Herbs. Spices. Seasoning mixes without salt.  Other foods  Unsalted popcorn and pretzels. Fat-free sweets.  The items listed above may not be a complete list of foods and beverages you can eat. Contact a dietitian for more information.  What foods should I avoid?  Fruits  Canned fruit in a light or heavy syrup. Fried fruit. Fruit in cream or butter sauce.  Vegetables  Creamed or fried vegetables. Vegetables in a cheese sauce. Regular canned vegetables (not low-sodium or reduced-sodium). Regular canned tomato sauce and paste (not low-sodium or reduced-sodium). Regular tomato and  vegetable juice (not low-sodium or reduced-sodium). Pickles. Olives.  Grains  Baked goods made with fat, such as croissants, muffins, or some breads. Dry pasta or rice meal packs.  Meats and other proteins  Fatty cuts of meat. Ribs. Fried meat. Cervantes. Bologna, salami, and other precooked or cured meats, such as sausages or meat loaves. Fat from the back of a pig (fatback). Bratwurst. Salted nuts and seeds. Canned beans with added salt. Canned or smoked fish. Whole eggs or egg yolks. Chicken or turkey with skin.  Dairy  Whole or 2% milk, cream, and half-and-half. Whole or full-fat cream cheese. Whole-fat or sweetened yogurt. Full-fat cheese. Nondairy creamers. Whipped toppings. Processed cheese and cheese spreads.  Fats and oils  Butter. Stick margarine. Lard. Shortening. Ghee. Cervantes fat. Tropical oils, such as coconut, palm kernel, or palm oil.  Seasonings and condiments  Onion salt, garlic salt, seasoned salt, table salt, and sea salt. Worcestershire sauce. Tartar sauce. Barbecue sauce. Teriyaki sauce. Soy sauce, including reduced-sodium. Steak sauce. Canned and packaged gravies. Fish sauce. Oyster sauce. Cocktail sauce. Store-bought horseradish. Ketchup. Mustard. Meat flavorings and tenderizers. Bouillon cubes. Hot sauces. Pre-made or packaged marinades. Pre-made or packaged taco seasonings. Relishes. Regular salad dressings.  Other foods  Salted popcorn and pretzels.  The items listed above may not be a complete list of foods and beverages you should avoid. Contact a dietitian for more information.  Where to find more information  · National Heart, Lung, and Blood [place]: www.nhlbi.nih.gov  · American Heart Association: www.heart.org  · Academy of Nutrition and Dietetics: www.eatright.org  · National Kidney Foundation: www.kidney.org  Summary  · The DASH eating plan is a healthy eating plan that has been shown to reduce high blood pressure (hypertension). It may also reduce your risk for type 2 diabetes, heart  disease, and stroke.  · When on the DASH eating plan, aim to eat more fresh fruits and vegetables, whole grains, lean proteins, low-fat dairy, and heart-healthy fats.  · With the DASH eating plan, you should limit salt (sodium) intake to 2,300 mg a day. If you have hypertension, you may need to reduce your sodium intake to 1,500 mg a day.  · Work with your health care provider or dietitian to adjust your eating plan to your individual calorie needs.  This information is not intended to replace advice given to you by your health care provider. Make sure you discuss any questions you have with your health care provider.  Document Revised: 11/20/2020 Document Reviewed: 11/20/2020  Elsevier Patient Education © 2021 [employer]

## 2022-01-13 NOTE — PROGRESS NOTES
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        Subjective     HPI: This is a 41-year-old male gentleman who was referred to us by Dr. Frandy Park for back pain.  He is here to be evaluated today.  The patient says that this occurred in December while he was at work.  He says that he was doing some lifting and a semitruck trailer and whenever he reached up and trying to push something he felt a strain in his back.  He did try to continue to work but the pain did become significant and he had to come to the emergency room.  He says the pain in his back has been present since that time but it has improved.  The patient has been able to go back to work full duty.  He says he does feel like he has having some pressure in his back but this is in his mid back area.  Nothing is really made it worse or better at this time.  Denies any radicular leg pain.  Denies any numbness or tingling.  Denies any bowel or bladder incontinence.  He has not done any recent physical therapy or To care pain management injections.  He is right-hand dominant.  He works as a .  Denies any tobacco, alcohol, or illicit drug use.  He is .  Rates his pain on scale 0-10 out of four.  He says he is doing better than what it was initially back in December.    Review of Systems   Constitutional: Positive for activity change.   HENT: Positive for congestion and sinus pressure.    Respiratory: Positive for apnea and shortness of breath.    Cardiovascular: Positive for leg swelling.   Gastrointestinal: Positive for abdominal distention.   Musculoskeletal: Positive for back pain, joint swelling, neck pain and neck stiffness.   Psychiatric/Behavioral:  "Positive for decreased concentration and sleep disturbance.   All other systems reviewed and are negative.       Past Medical History:   Diagnosis Date   • COPD (chronic obstructive pulmonary disease) (HCC)    • Encephalitis    • Hypertension    • Liver fibrosis     stage 3   • Low back pain    • Pancreatitis    • Pulmonary hypertension (HCC)     patient states he haad a tralee event while giving blood and went into pulmonary htn   • Sleep apnea     cpap   • Thoracic back pain      Past Surgical History:   Procedure Laterality Date   • CHOLECYSTECTOMY     • ENDOSCOPY N/A 6/17/2020    Procedure: ESOPHAGOGASTRODUODENOSCOPY WITH ANESTHESIA;  Surgeon: Solo Chirinos MD;  Location: [facility] ENDOSCOPY;  Service: Gastroenterology;  Laterality: N/A;  preop; acid reflux; abdominal pain; difficulty swallowing  postop  Frandy Park MD   • SINUS SURGERY       Family History   Problem Relation Age of Onset   • Colon cancer Father    • Cancer Father    • Liver disease Mother    • Asthma Sister      Social History     Tobacco Use   • Smoking status: Never Smoker   • Smokeless tobacco: Never Used   Vaping Use   • Vaping Use: Never used   Substance Use Topics   • Alcohol use: No   • Drug use: No     (Not in a hospital admission)    Allergies:  Patient has no known allergies.    Objective      Vital Signs  /90   Ht 180.3 cm (71\")   Wt (!) 157 kg (345 lb 9.6 oz)   BMI 48.20 kg/m²     Physical Exam  Constitutional:       Appearance: Normal appearance. He is well-developed.   HENT:      Head: Normocephalic.   Eyes:      General: Lids are normal.      Extraocular Movements: EOM normal.      Conjunctiva/sclera: Conjunctivae normal.      Pupils: Pupils are equal, round, and reactive to light.   Cardiovascular:      Rate and Rhythm: Normal rate and regular rhythm.   Pulmonary:      Effort: Pulmonary effort is normal.      Breath sounds: Normal breath sounds.   Musculoskeletal:         General: Normal range of motion.     "  Cervical back: Normal range of motion.      Comments: Thoracic pain   Skin:     General: Skin is warm.   Neurological:      Mental Status: He is alert and oriented to person, place, and time.      GCS: GCS eye subscore is 4. GCS verbal subscore is 5. GCS motor subscore is 6.      Cranial Nerves: No cranial nerve deficit.      Sensory: No sensory deficit.      Gait: Gait is intact.      Deep Tendon Reflexes: Strength normal and reflexes are normal and symmetric. Reflexes normal.   Psychiatric:         Speech: Speech normal.         Behavior: Behavior normal.         Thought Content: Thought content normal.         Neurologic Exam     Mental Status   Oriented to person, place, and time.   Attention: normal. Concentration: normal.   Speech: speech is normal   Level of consciousness: alert  Normal comprehension.     Cranial Nerves     CN II   Visual fields full to confrontation.     CN III, IV, VI   Pupils are equal, round, and reactive to light.  Extraocular motions are normal.     CN V   Facial sensation intact.     CN VII   Facial expression full, symmetric.     CN VIII   CN VIII normal.     CN IX, X   CN IX normal.   CN X normal.     CN XI   CN XI normal.     CN XII   CN XII normal.     Motor Exam   Muscle bulk: normal    Strength   Strength 5/5 throughout.     Sensory Exam   Light touch normal.     Gait, Coordination, and Reflexes     Gait  Gait: normal    Reflexes   Reflexes 2+ except as noted.       Imaging review: MRI of the thoracic spine that was done on December 11, 2021.  [facility] does not show any evidence of any central canal stenosis or significant foraminal narrowing.  There is chronic compression deformities noted of T10, T11 and T12.  On CT scan these were felt to be developmental possibly related to Sheuermanns kyphosis    CT scan of the lumbar spine does show disc degeneration at L5-S1.  No fracture visualized.  No cord signal change.    Assessment/Plan: I think the patient is  suffering from a thoracic muscle strain.  For first line conservative care of thoracic pain, I would like to send Mr. Pizarro for a dedicated course of physician directed physical therapy consisting of 2-3 times a week for 4-6 weeks.    Return for reassessment with me after 6-8 weeks after physical therapy.     I advised the patient to call and return sooner for new or worsening complaints of weakness, paresthesias, gait disturbances, or any additional concerns.  Treatment options discussed in detail with Hernandez and the patient voiced understanding.  Mr. Pizarro agrees with this plan of care.     Patient is a nonsmoker  The patient's Body mass index is 48.2 kg/m².. BMI is above normal parameters. Recommendations include: educational material and nutrition counseling    Diagnoses and all orders for this visit:    1. Strain of thoracic spine (Primary)  -     Ambulatory Referral to Physical Therapy Evaluate and treat (2-3 days a week for 4-6 weeks )    2. Non-smoker    3. Morbid obesity with BMI of 45.0-49.9, adult (HCC)          I discussed the patients findings and my recommendations with patient    Richy Delgado, APRN  01/13/22  13:54 CST

## 2022-01-13 NOTE — PROGRESS NOTES
A My-Chart message has been sent to the patient for PATIENT ROUNDING with [facility].

## 2022-02-02 RX ORDER — POTASSIUM CHLORIDE 750 MG/1
20 TABLET, FILM COATED, EXTENDED RELEASE ORAL DAILY
Qty: 60 TABLET | Refills: 5 | Status: SHIPPED | OUTPATIENT
Start: 2022-02-02 | End: 2023-01-24

## 2022-03-03 DIAGNOSIS — I10 ESSENTIAL HYPERTENSION: ICD-10-CM

## 2022-03-03 RX ORDER — AMLODIPINE BESYLATE 10 MG/1
10 TABLET ORAL DAILY
Qty: 30 TABLET | Refills: 5 | Status: SHIPPED | OUTPATIENT
Start: 2022-03-03 | End: 2022-09-22 | Stop reason: SDUPTHER

## 2022-03-03 NOTE — TELEPHONE ENCOUNTER
Rx Refill Note  Requested Prescriptions     Pending Prescriptions Disp Refills   • amLODIPine (Norvasc) 10 MG tablet 30 tablet 5     Sig: Take 1 tablet by mouth Daily.      Last office visit with prescribing clinician: 12/16/2021      Next office visit with prescribing clinician: 8/1/2022            Casi Romeo MA  03/03/22, 09:00 CST

## 2022-07-05 DIAGNOSIS — K76.0 FATTY LIVER: ICD-10-CM

## 2022-07-05 DIAGNOSIS — R60.9 EDEMA, UNSPECIFIED TYPE: ICD-10-CM

## 2022-07-05 DIAGNOSIS — R03.0 ELEVATED BLOOD PRESSURE READING: ICD-10-CM

## 2022-07-05 DIAGNOSIS — E66.9 OBESITY, UNSPECIFIED CLASSIFICATION, UNSPECIFIED OBESITY TYPE, UNSPECIFIED WHETHER SERIOUS COMORBIDITY PRESENT: ICD-10-CM

## 2022-07-05 RX ORDER — FUROSEMIDE 20 MG/1
20 TABLET ORAL 2 TIMES DAILY
Qty: 180 TABLET | Refills: 1 | Status: CANCELLED | OUTPATIENT
Start: 2022-07-05

## 2022-09-22 DIAGNOSIS — I10 ESSENTIAL HYPERTENSION: ICD-10-CM

## 2022-09-22 RX ORDER — AMLODIPINE BESYLATE 10 MG/1
10 TABLET ORAL DAILY
Qty: 30 TABLET | Refills: 1 | Status: SHIPPED | OUTPATIENT
Start: 2022-09-22 | End: 2022-11-14 | Stop reason: SDUPTHER

## 2022-09-22 NOTE — TELEPHONE ENCOUNTER
No follow up appt on timeline, note on refill to call for appt    Rx Refill Note  Requested Prescriptions     Pending Prescriptions Disp Refills   • amLODIPine (Norvasc) 10 MG tablet 30 tablet 5     Sig: Take 1 tablet by mouth Daily.      Last office visit with prescribing clinician: 12/16/2021      Next office visit with prescribing clinician: Visit date not found            Elisa Chen LPN  09/22/22, 10:44 CDT

## 2022-11-14 DIAGNOSIS — I10 ESSENTIAL HYPERTENSION: ICD-10-CM

## 2022-11-14 RX ORDER — AMLODIPINE BESYLATE 10 MG/1
10 TABLET ORAL DAILY
Qty: 30 TABLET | Refills: 1 | Status: SHIPPED | OUTPATIENT
Start: 2022-11-14 | End: 2023-02-08 | Stop reason: SDUPTHER

## 2022-11-14 NOTE — TELEPHONE ENCOUNTER
Rx Refill Note  Requested Prescriptions     Pending Prescriptions Disp Refills   • amLODIPine (Norvasc) 10 MG tablet 30 tablet 1     Sig: Take 1 tablet by mouth Daily. Must call for office visit to continue to get refills, thank you      Last office visit with prescribing clinician: 12/16/2021      Next office visit with prescribing clinician: Visit date not found            Roslyn Longoria MA  11/14/22, 16:33 CST

## 2023-01-24 ENCOUNTER — OFFICE VISIT (OUTPATIENT)
Dept: FAMILY MEDICINE CLINIC | Facility: CLINIC | Age: 43
End: 2023-01-24
Payer: COMMERCIAL

## 2023-01-24 VITALS
HEIGHT: 71 IN | RESPIRATION RATE: 14 BRPM | SYSTOLIC BLOOD PRESSURE: 160 MMHG | DIASTOLIC BLOOD PRESSURE: 90 MMHG | OXYGEN SATURATION: 97 % | BODY MASS INDEX: 44.1 KG/M2 | WEIGHT: 315 LBS | HEART RATE: 74 BPM

## 2023-01-24 DIAGNOSIS — R19.5 CHANGE IN STOOL: ICD-10-CM

## 2023-01-24 DIAGNOSIS — Z80.0 FAMILY HISTORY OF COLON CANCER: ICD-10-CM

## 2023-01-24 DIAGNOSIS — E66.01 CLASS 3 SEVERE OBESITY WITH BODY MASS INDEX (BMI) OF 45.0 TO 49.9 IN ADULT, UNSPECIFIED OBESITY TYPE, UNSPECIFIED WHETHER SERIOUS COMORBIDITY PRESENT: ICD-10-CM

## 2023-01-24 DIAGNOSIS — I10 PRIMARY HYPERTENSION: ICD-10-CM

## 2023-01-24 DIAGNOSIS — R03.0 ELEVATED BLOOD PRESSURE READING: ICD-10-CM

## 2023-01-24 PROCEDURE — 99213 OFFICE O/P EST LOW 20 MIN: CPT | Performed by: FAMILY MEDICINE

## 2023-01-24 NOTE — PROGRESS NOTES
Subjective   Hernandez Pizarro is a 42 y.o. male presenting with chief complaint of:   Chief Complaint   Patient presents with   • Rectal Bleeding     Patient states he has had irregular BM and at times has had blood in his stool.     Answers for HPI/ROS submitted by the patient on 2023  What is the primary reason for your visit?: Other  Please describe your symptoms.: Inconsistencies in bowel movements, pressure in the region  Have you had these symptoms before?: Yes  How long have you been having these symptoms?: Greater than 2 weeks  Please list any medications you are currently taking for this condition.: None  Please describe any probable cause for these symptoms. : Family history of colon cancer, Father passed away at 38 years of age from it. Wanting to rule out any chance of cancer.    AWV NA    History of Present Illness :  Alone.   Here for review of chronic problems that includes HTN and others.  A    Has multiple chronic problems to consider that might have a bearing on today's issues; somewhat an interval appointment.       Chronic/acute problems reviewed today:   1. Change in stool last year his stools as seen different; varying between soft and slightly firm with smaller pieces and not of 1 or 2 portions as in the past.  Occasional red blood in the stool; mostly on wiping.  No abdominal pain   2. Family history of colon cancer Father  of lung cancer 28   3. Primary hypertension Chronic/unstable. Above targets today/without home blood pressures.  No significant chest pain, SOB, LE edema, orthopnea, near syncope, dizziness/light headness.  Agrees to follow next few days and let us know if stays elevated.   Recent Vitals       2021       BP: 140/86 130/90 160/90     Pulse: 77 -- 74     Temp: 98 °F (36.7 °C) -- --     Weight: 156 kg (344 lb) 157 kg (345 lb 9.6 oz) 156 kg (345 lb)     BMI (Calculated): 48 48.2 48.1            4. Elevated blood pressure reading see blood  pressure   5. Class 3 severe obesity with body mass index (BMI) of 45.0 to 49.9 in adult, unspecified obesity type, unspecified whether serious comorbidity present (HCC) Chronic/stable.  Aware, advised weight loss before.  On/off some success with weight loss but often with weight gain back.        Has an/another acute issue today: none.    The following portions of the patient's history were reviewed and updated as appropriate: allergies, current medications, past family history, past medical history, past social history, past surgical history and problem list.      Current Outpatient Medications:   •  amLODIPine (Norvasc) 10 MG tablet, Take 1 tablet by mouth Daily. Must call for office visit to continue to get refills, thank you, Disp: 30 tablet, Rfl: 1  •  Unable to find, 1 each 1 (One) Time. Tuddca    biosalt supplement   500mg BID, Disp: , Rfl:     No problems with medications.    No Known Allergies    Review of Systems  GENERAL:  Inactive/slower with limits, speed, stamina for age and SOB occ; back to work/ . Sleep is ok usually; with cpap. No fever now/recent.  SKIN: No rash/skin lesion of concern.  ENDO:  No syncope, near or diaphoretic sweaty spells.  BS Ok recent.  HEENT: No recent head injury; or headache.  No vision change.  No hearing loss.  Ears without pain/drainage.  No sore throat.  No significant nasal/sinus congestion/drainage. No epistaxis.  CHEST: No chest wall tenderness or mass. No significant cough,  without wheeze.  Occ mild SOB; no hemoptysis.  CV: No exertional chest pain, palpitations, occ/ankle edema.  GI: No dysphagia or heartburn.  No abdominal pain; ? mild diarrhea, constipation.  No melena.    :  Voids without dysuria, or incontinence to completion.  ORTHO: No painful/swollen joints but various on /off sore.  No sore neck or back.  No acute neck or back pain without recent injury.  NEURO: No focal/significant weakness of extremities. No dizziness.   No  numbness/paresthesias.   PSYCH: No memory loss.  Mood good usually; admits with this anxious without depressed but/and not suicidal.  Tries to tolerate stress .   Screening:  Mammogram: NA  Bone density: NA  Low dose CT chest: Tobacco-smoker/none  GI: offered  Prostate: NA  Usual lab order  NA    Copy/paste function used for ROS/exam AND each area of these were reviewed, updated, confirmed and supplemented as needed.  Data reviewed:   Recent admit/ER/MD visits: none  Last cardiac testing:   Echo: Results for orders placed during the hospital encounter of 06/04/20    Adult Stress Echo W/ Cont or Stress Agent if Necessary Per Protocol    Interpretation Summary  · Left ventricular systolic function is normal.    LOW RISK FOR ISCHEMIA      Radiology considered:   No radiology results for the last 90 days.    Lab Results:  Results for orders placed or performed in visit on 08/23/21   COVID-19,[facility] ROUTINE, NP/OP SWAB IN TRANSPORT MEDIA OR ESWAB 72 HR TAT - ,   Result Value Ref Range    SARS-CoV-2, ELISABETH Not Detected Not Detected   SARS-CoV-2, ELISABETH 2 DAY TAT - ,   Result Value Ref Range    [facility] SARS-COV-2, ELISABETH 2 DAY TAT Performed        A1C:  Lab Results - Last 18 Months   Lab Units 07/28/21  1016   HEMOGLOBIN A1C % 5.30     GLUCOSE:  Lab Results - Last 18 Months   Lab Units 07/28/21  1016   GLUCOSE mg/dL 92     LIPID:No results for input(s): CHLPL, LDL, HDL, TRIG in the last 51958 hours.  PSA:No results for input(s): PSA in the last 40754 hours.    CBC:  Lab Results - Last 18 Months   Lab Units 07/28/21  1016   WBC 10*3/mm3 6.54   HEMOGLOBIN g/dL 15.6   HEMATOCRIT % 47.2   PLATELETS 10*3/mm3 250      BMP/CMP:  Lab Results - Last 18 Months   Lab Units 07/28/21  1016   SODIUM mmol/L 141   POTASSIUM mmol/L 4.4   CHLORIDE mmol/L 103   TOTAL CO2 mmol/L 27.0   GLUCOSE mg/dL 92   BUN mg/dL 18   CREATININE mg/dL 0.91   EGFR IF NONAFRICN AM mL/min/1.73 92   EGFR IF AFRICN AM mL/min/1.73 112   CALCIUM mg/dL 9.5  "    HEPATIC:  Lab Results - Last 18 Months   Lab Units 07/28/21  1016   ALT (SGPT) U/L 30   AST (SGOT) U/L 26   ALK PHOS U/L 133*     Vit D:No results for input(s): [id number] in the last 31069 hours.  THYROID:  Lab Results - Last 18 Months   Lab Units 07/28/21  1016   TSH uIU/mL 1.850         Objective   /90   Pulse 74   Resp 14   Ht 180.3 cm (71\")   Wt (!) 156 kg (345 lb)   SpO2 97%   BMI 48.12 kg/m²   Body mass index is 48.12 kg/m².    Recent Vitals       12/16/2021 1/13/2022 1/24/2023       BP: 140/86 130/90 160/90     Pulse: 77 -- 74     Temp: 98 °F (36.7 °C) -- --     Weight: 156 kg (344 lb) 157 kg (345 lb 9.6 oz) 156 kg (345 lb)     BMI (Calculated): 48 48.2 48.1         Wt Readings from Last 15 Encounters:   01/24/23 1444 (!) 156 kg (345 lb)   01/13/22 1322 (!) 157 kg (345 lb 9.6 oz)   12/16/21 0947 (!) 156 kg (344 lb)   12/08/21 1118 (!) 152 kg (336 lb)   12/02/21 1710 (!) 152 kg (336 lb)   07/29/21 1019 (!) 146 kg (321 lb 12.8 oz)   07/28/21 1040 (!) 146 kg (322 lb)   06/23/21 1050 (!) 147 kg (323 lb 12.8 oz)   12/30/20 1832 136 kg (300 lb)   07/09/20 1513 (!) 146 kg (320 lb 12.8 oz)   06/17/20 0931 (!) 149 kg (329 lb)   06/11/20 0802 (!) 148 kg (327 lb)   06/10/20 1135 (!) 147 kg (325 lb)   06/06/20 2024 (!) 147 kg (324 lb)   06/04/20 1048 (!) 151 kg (332 lb 14.3 oz)       Physical Exam  GENERAL:  Well nourished/developed in no acute distress.   SKIN: Turgor excellent, without wound, rash, lesion.  HEENT: Normal cephalic without trauma.  Pupils equal round reactive to light. Extraocular motions full without nystagmus.     Oral cavity without growths, exudates, and moist.  Posterior pharynx without mass, obstruction, redness.  No thyromegaly, mass, tenderness, lymphadenopathy and supple.  CV: Regular rhythm.  No murmur, gallop, edema. Posterior pulses intact.  No carotid bruits.  CHEST: No chest wall tenderness or mass.   LUNGS: Symmetric motion with clear to auscultation.    ABD: Soft, nontender " without mass.   ORTHO: Symmetric extremities without swelling/point tenderness.  Full gross range of motion.  NEURO: CN 2-12 grossly intact.  Symmetric facies and UE/LE. 3-4/5 strength throughout. 1/4 x bicep equal reflexes.  Nonfocal use extremities. Speech clear. Intact light touch with monofilament, vibratory sensation with tuning fork; equal toes/distal feet.    PSYCH: Oriented x 3.  Pleasant calm, well kept.  Purposeful/directed conservation with intact short/long gross memory.     Assessment & Plan     1. Change in stool    2. Family history of colon cancer    3. Primary hypertension    4. Elevated blood pressure reading    5. Class 3 severe obesity with body mass index (BMI) of 45.0 to 49.9 in adult, unspecified obesity type, unspecified whether serious comorbidity present (HCC)        Data review above:   Discussions/medical decisions/reviews:  BP too high; thankfully asx.  Too high for DOT.  Home monitoring advised  Other vitals weight always an issue    Needs colonoscopy  When has colon; will be fasting so asking for him to update his labs at /lab    Data review above:   Rx: reviewed and decisions:   Rx new/changes:   No orders of the defined types were placed in this encounter.      Orders placed:   LAB/Testing/Referrals: reviewed/orders:   Today:   Orders Placed This Encounter   Procedures   • Comprehensive metabolic panel   • Lipid Panel With LDL/HDL Ratio   • TSH   • Hemoglobin A1c   • Ambulatory Referral to Gastroenterology   • CBC and Differential     Chronic/recurrent labs above or change to:   Same   Screening reviewed/updated     Immunization History   Administered Date(s) Administered   • COVID-19 (MODERNA) 1st, 2nd, 3rd Dose Only 12/06/2021, 01/03/2022     Vaccine reviewed: today none; later we advised/reaffirmed our support/suggestion for staying complete with covid- covid boosters, seasonal flu/yearly and any missing vaccine from list we supplied; we suggest contact with local health  department office to review missing/needed vaccines and then bring nursing documentation for these vaccines to this office.     Health maintenance:   Body mass index is 48.12 kg/m².  Class 3 Severe Obesity (BMI >=40). Obesity-related health conditions include the following: hypertension. Obesity is unchanged. BMI is is above average; BMI management plan is completed. We discussed portion control and increasing exercise.      Tobacco use reviewed:   Hernandez Pizarro  reports that he has never smoked. He has never used smokeless tobacco..       Patient Instructions   Your blood pressure was higher than what we really wanted for you today at 160/90.  It is often a problem for blood pressures in the doctor's office to be higher than home (called white coat syndrome).   Goals for your blood pressure are 130-140 range top and 80-89 range bottom and you feeling ok.     We really advise rechecking your blood pressure at home (or with a friend) daily to every other day for the next several days and let us know if your pattern is not the goals above.  If you will do this make sure you control variables that can make your blood pressure show higher than it really is:   A. Use a machine that measures your blood pressure at the upper arm level; not wrist or lower  B. Take off any shirts/garmets and apply the cuff to your bare arm  C. Be sure and rest your arm being used on a table/like so it is totally supported  D. Do not cross your legs while taking your blood pressure  E. Be calm, rested and not upset/anxious in any way while taking your blood pressure  F. Avoid talking while taking the blood pressure  G. Be sure your back is supported and not in a strain while taking your blood pressure.     If you cannot do this at home/with a friend OR want it done here we are happy to make appointments here for that (we only charge/bill for a nurse visit for doing this).      Please CALL US if your blood pressure stays up as that probably  means you have a problem; we likely will adjust things even over the phone.  We want your blood pressure to be normal.     OMRON is a reliable/common home blood automatic blood pressure device that can range around 50-75$ and most of this brand is certified by the government.  A good model would be Omron model 7.  Many chain brands ([employer], etc) if you look will say they are made by Omron.     ########################        Visit today involved chronic significant medical problems or differentials and/or intensive drug monitoring: ie potential to cause serious morbidity or death:     Follow up: Return for will see how things go.  No future appointments.

## 2023-01-24 NOTE — PATIENT INSTRUCTIONS
Your blood pressure was higher than what we really wanted for you today at 160/90.  It is often a problem for blood pressures in the doctor's office to be higher than home (called white coat syndrome).   Goals for your blood pressure are 130-140 range top and 80-89 range bottom and you feeling ok.     We really advise rechecking your blood pressure at home (or with a friend) daily to every other day for the next several days and let us know if your pattern is not the goals above.  If you will do this make sure you control variables that can make your blood pressure show higher than it really is:   A. Use a machine that measures your blood pressure at the upper arm level; not wrist or lower  B. Take off any shirts/garmets and apply the cuff to your bare arm  C. Be sure and rest your arm being used on a table/like so it is totally supported  D. Do not cross your legs while taking your blood pressure  E. Be calm, rested and not upset/anxious in any way while taking your blood pressure  F. Avoid talking while taking the blood pressure  G. Be sure your back is supported and not in a strain while taking your blood pressure.     If you cannot do this at home/with a friend OR want it done here we are happy to make appointments here for that (we only charge/bill for a nurse visit for doing this).      Please CALL US if your blood pressure stays up as that probably means you have a problem; we likely will adjust things even over the phone.  We want your blood pressure to be normal.     OMRON is a reliable/common home blood automatic blood pressure device that can range around 50-75$ and most of this brand is certified by the government.  A good model would be Omron model 7.  Many chain brands ([employer], etc) if you look will say they are made by [employer].     ########################

## 2023-02-02 ENCOUNTER — OFFICE VISIT (OUTPATIENT)
Dept: GASTROENTEROLOGY | Facility: CLINIC | Age: 43
End: 2023-02-02
Payer: COMMERCIAL

## 2023-02-02 VITALS
OXYGEN SATURATION: 98 % | SYSTOLIC BLOOD PRESSURE: 132 MMHG | WEIGHT: 315 LBS | TEMPERATURE: 97.5 F | HEART RATE: 76 BPM | HEIGHT: 71 IN | BODY MASS INDEX: 44.1 KG/M2 | DIASTOLIC BLOOD PRESSURE: 84 MMHG

## 2023-02-02 DIAGNOSIS — Z80.0 FAMILY HX OF COLON CANCER: ICD-10-CM

## 2023-02-02 DIAGNOSIS — R19.4 CHANGE IN BOWEL HABITS: Primary | ICD-10-CM

## 2023-02-02 DIAGNOSIS — K76.0 FATTY LIVER: ICD-10-CM

## 2023-02-02 DIAGNOSIS — K62.5 BRBPR (BRIGHT RED BLOOD PER RECTUM): ICD-10-CM

## 2023-02-02 PROCEDURE — 99214 OFFICE O/P EST MOD 30 MIN: CPT | Performed by: NURSE PRACTITIONER

## 2023-02-02 NOTE — PROGRESS NOTES
[facility] GASTROENTEROLOGY - OFFICE NOTE    2/2/2023    Hernandez Pizarro   1980    Primary Physician: Frandy Park MD    Chief Complaint   Patient presents with   • GI Problem     Change in bowel habits         HISTORY OF PRESENT ILLNESS:     Hernandez Pizarro is a 42 y.o. male presents with a change in bowel habits x 1 year. This is manifested by change in caliber of stool. Has also noted intermittent bright red blood per rectum.   He does have a bm daily.  No weight loss. No abdominal pain.       Fatty liver  Ultrasound elastography last year metavir score F3. He understands this is not a perfect test. He is to have labs this month ordered by his pcp.  Has used Tudka supplement and improved lft's. No weight loss. No jaundice or fever. No abdominal pain.        No history of colonoscopy.  Father had colon cancer at age 38.     UPPER GI ENDOSCOPY (06/17/2020 11:54)      Past Medical History:   Diagnosis Date   • Asthma 2007    Mild COPD   • Cholelithiasis 1996    Surgically removed   • COPD (chronic obstructive pulmonary disease) (HCC)    • Encephalitis    • Hypertension    • Liver fibrosis     stage 3   • Low back pain    • Obesity All my life   • Pancreatitis    • Pulmonary hypertension (HCC)     patient states he haad a tralee event while giving blood and went into pulmonary htn   • Scoliosis 1987   • Sleep apnea     cpap   • Thoracic back pain        Past Surgical History:   Procedure Laterality Date   • CHOLECYSTECTOMY     • ENDOSCOPY N/A 6/17/2020    Procedure: ESOPHAGOGASTRODUODENOSCOPY WITH ANESTHESIA;  Surgeon: Solo Chirinos MD;  Location: [facility] ENDOSCOPY;  Service: Gastroenterology;  Laterality: N/A;  preop; acid reflux; abdominal pain; difficulty swallowing  postop  Frandy Park MD   • SINUS SURGERY         Outpatient Medications Marked as Taking for the 2/2/23 encounter (Office Visit) with Zully Watson APRN   Medication Sig Dispense Refill   • Unable to find 1 each 1 (One)  Time. Tuddca    biosalt supplement   500mg BID     • [DISCONTINUED] amLODIPine (Norvasc) 10 MG tablet Take 1 tablet by mouth Daily. Must call for office visit to continue to get refills, thank you 30 tablet 1       No Known Allergies    Social History     Socioeconomic History   • Marital status:      Spouse name: Yudelka   • Number of children: 2   • Years of education: 14   • Highest education level: Some college, no degree   Tobacco Use   • Smoking status: Never   • Smokeless tobacco: Never   Vaping Use   • Vaping Use: Never used   Substance and Sexual Activity   • Alcohol use: No   • Drug use: No   • Sexual activity: Yes     Partners: Female     Birth control/protection: Surgical, Abstinence, None, Hysterectomy       Family History   Problem Relation Age of Onset   • Colon cancer Father    • Cancer Father            • Early death Father            • Liver disease Mother            • Early death Mother            • Heart disease Mother            • Asthma Sister    • Alcohol abuse Maternal Grandfather            • Cancer Maternal Grandfather            • Vision loss Maternal Grandfather    • Cancer Maternal Grandmother            • Miscarriages / Stillbirths Maternal Grandmother    • Alcohol abuse Paternal Grandfather            • Diabetes Paternal Grandmother    • Anxiety disorder Daughter    • Birth defects Daughter    • Developmental Disability Daughter    • Learning disabilities Daughter    • Mental illness Daughter    • Asthma Sister    • Birth defects Son    • Developmental Disability Son    • Learning disabilities Son    • Mental illness Son        Review of Systems   Constitutional: Negative for chills, fever and unexpected weight change.   Respiratory: Negative for shortness of breath.    Cardiovascular: Negative for chest pain.   Gastrointestinal: Negative for abdominal distention, abdominal pain, nausea and vomiting.     "    Vitals:    02/02/23 0833   BP: 132/84   Cuff Size: Large Adult   Pulse: 76   Temp: 97.5 °F (36.4 °C)   SpO2: 98%   Weight: (!) 155 kg (341 lb)   Height: 180.3 cm (71\")      Body mass index is 47.56 kg/m².    Physical Exam  Vitals reviewed.   Constitutional:       General: He is not in acute distress.     Appearance: He is obese.   Cardiovascular:      Rate and Rhythm: Normal rate and regular rhythm.      Heart sounds: Normal heart sounds.   Pulmonary:      Effort: Pulmonary effort is normal.      Breath sounds: Normal breath sounds.   Abdominal:      General: Bowel sounds are normal. There is no distension.      Palpations: Abdomen is soft.      Tenderness: There is no abdominal tenderness.   Skin:     General: Skin is warm and dry.   Neurological:      Mental Status: He is alert.         Results for orders placed or performed in visit on 08/23/21   COVID-19,[facility] ROUTINE, NP/OP SWAB IN TRANSPORT MEDIA OR ESWAB 72 HR TAT - ,   Result Value Ref Range    SARS-CoV-2, ELISABETH Not Detected Not Detected   SARS-CoV-2, ELISABETH 2 DAY TAT - ,   Result Value Ref Range    [facility] SARS-COV-2, ELISABETH 2 DAY TAT Performed            ASSESSMENT AND PLAN    Assessment & Plan     Diagnoses and all orders for this visit:    1. Change in bowel habits (Primary)  -     Case Request; Standing  -     Case Request    2. BRBPR (bright red blood per rectum)  -     Case Request; Standing  -     Case Request    3. Family hx of colon cancer    4. Fatty liver  -     US Elastography Parenchyma; Future  -     US Liver; Future    Other orders  -     Implement Anesthesia Orders Day of Procedure; Standing  -     Obtain Informed Consent; Standing    I recommend fiber supplement daily ( at least 10 gm/daily). I recommend colonoscopy for further eval and he is agreeable. I recommend emergency room if worsening or severe symptoms of bleeding.         In regards to fatty liver, I discussed how fatty liver can lead to cirrhosis, disability and pre-mature death.  " How it also can be a sign of increased risk for cardiovascular disease.  I discussed the importance of getting rid of fat in the liver by controlling lipids and glucose, avoiding etoh helps, and gradual weight loss to ideal body weight is very important. Recheck ultrasound elastography. He is due for labs this month ordered by his pcp.  Ov  1 year.     Addendum; 2020 SMA normal, alpha 1 antitrypsin normal, ceruloplasmin normal, iron saturation normal, AMA normal, and NUVIA negative.  Viral hepatitis panel negative.        COLONOSCOPY WITH ANESTHESIA (N/A)  All risks, benefits, alternatives, and indications of colonoscopy procedure have been discussed with the patient. Risks to include perforation of the colon requiring possible surgery or colostomy, risk of bleeding from biopsies or removal of colon tissue, possibility of missing a colon polyp or cancer, or adverse drug reaction.  Benefits to include the diagnosis and management of disease of the colon and rectum. Alternatives to include barium enema, radiographic evaluation, lab testing or no intervention. Pt verbalizes understanding and agrees.              Return in about 1 year (around 2/2/2024).          There are no Patient Instructions on file for this visit.      Zully Watson, APRN

## 2023-02-03 PROBLEM — K62.5 BRBPR (BRIGHT RED BLOOD PER RECTUM): Status: ACTIVE | Noted: 2023-02-03

## 2023-02-03 PROBLEM — R19.4 CHANGE IN BOWEL HABITS: Status: ACTIVE | Noted: 2023-02-03

## 2023-02-08 DIAGNOSIS — I10 ESSENTIAL HYPERTENSION: ICD-10-CM

## 2023-02-08 RX ORDER — AMLODIPINE BESYLATE 10 MG/1
10 TABLET ORAL DAILY
Qty: 90 TABLET | Refills: 1 | Status: SHIPPED | OUTPATIENT
Start: 2023-02-08 | End: 2023-03-14 | Stop reason: SDUPTHER

## 2023-02-08 NOTE — TELEPHONE ENCOUNTER
Rx Refill Note  Requested Prescriptions     Pending Prescriptions Disp Refills   • amLODIPine (Norvasc) 10 MG tablet 90 tablet 1     Sig: Take 1 tablet by mouth Daily. Must call for office visit to continue to get refills, thank you      Last office visit with prescribing clinician: 1/24/2023      Next office visit with prescribing clinician: Visit date not found            Elisa Chen LPN  02/08/23, 16:41 CST

## 2023-02-27 ENCOUNTER — HOSPITAL ENCOUNTER (OUTPATIENT)
Dept: ULTRASOUND IMAGING | Facility: HOSPITAL | Age: 43
Discharge: HOME | End: 2023-02-27
Payer: COMMERCIAL

## 2023-02-27 DIAGNOSIS — K76.0 FATTY LIVER: ICD-10-CM

## 2023-02-27 PROCEDURE — 76981 USE PARENCHYMA: CPT

## 2023-02-27 PROCEDURE — 76705 ECHO EXAM OF ABDOMEN: CPT

## 2023-03-01 ENCOUNTER — TELEPHONE (OUTPATIENT)
Dept: GASTROENTEROLOGY | Facility: CLINIC | Age: 43
End: 2023-03-01

## 2023-03-01 NOTE — TELEPHONE ENCOUNTER
Brock, can you please put him in recall for ultrasound elastography in 1 year.  Also let the girls up front know he needs to come back for office visit in 1 year as well with Dr. Chirinos.  I did send him a message through [employer].        I have called him twice to go over ultrasound elastography results.  I did leave him a message to contact us to go over ultrasound.

## 2023-03-14 DIAGNOSIS — I10 ESSENTIAL HYPERTENSION: ICD-10-CM

## 2023-03-15 RX ORDER — AMLODIPINE BESYLATE 10 MG/1
10 TABLET ORAL DAILY
Qty: 90 TABLET | Refills: 1 | Status: SHIPPED | OUTPATIENT
Start: 2023-03-15

## 2023-03-15 NOTE — TELEPHONE ENCOUNTER
Rx Refill Note  Requested Prescriptions     Pending Prescriptions Disp Refills   • amLODIPine (Norvasc) 10 MG tablet 90 tablet 1     Sig: Take 1 tablet by mouth Daily.      Last office visit with prescribing clinician: 1/24/2023      Next office visit with prescribing clinician: Visit date not found            Roslyn Longoria MA  03/15/23, 13:32 CDT

## 2023-03-20 ENCOUNTER — ANESTHESIA EVENT (OUTPATIENT)
Dept: GASTROENTEROLOGY | Facility: HOSPITAL | Age: 43
End: 2023-03-20
Payer: COMMERCIAL

## 2023-03-20 ENCOUNTER — ANESTHESIA (OUTPATIENT)
Dept: GASTROENTEROLOGY | Facility: HOSPITAL | Age: 43
End: 2023-03-20
Payer: COMMERCIAL

## 2023-03-20 ENCOUNTER — HOSPITAL ENCOUNTER (OUTPATIENT)
Facility: HOSPITAL | Age: 43
Setting detail: HOSPITAL OUTPATIENT SURGERY
Discharge: HOME | End: 2023-03-20
Attending: INTERNAL MEDICINE | Admitting: INTERNAL MEDICINE
Payer: COMMERCIAL

## 2023-03-20 VITALS
TEMPERATURE: 97 F | BODY MASS INDEX: 44.1 KG/M2 | SYSTOLIC BLOOD PRESSURE: 112 MMHG | WEIGHT: 315 LBS | HEART RATE: 73 BPM | OXYGEN SATURATION: 95 % | HEIGHT: 71 IN | RESPIRATION RATE: 20 BRPM | DIASTOLIC BLOOD PRESSURE: 84 MMHG

## 2023-03-20 DIAGNOSIS — R19.4 CHANGE IN BOWEL HABITS: ICD-10-CM

## 2023-03-20 DIAGNOSIS — K62.5 BRBPR (BRIGHT RED BLOOD PER RECTUM): ICD-10-CM

## 2023-03-20 PROCEDURE — 25010000002 PROPOFOL 10 MG/ML EMULSION: Performed by: NURSE ANESTHETIST, CERTIFIED REGISTERED

## 2023-03-20 PROCEDURE — 88305 TISSUE EXAM BY PATHOLOGIST: CPT | Performed by: INTERNAL MEDICINE

## 2023-03-20 PROCEDURE — 45385 COLONOSCOPY W/LESION REMOVAL: CPT | Performed by: INTERNAL MEDICINE

## 2023-03-20 DEVICE — DEV CLIP HEMO RESOLUTION360/ULTR 235CM 17MM STRL: Type: IMPLANTABLE DEVICE | Site: CECUM | Status: FUNCTIONAL

## 2023-03-20 RX ORDER — SODIUM CHLORIDE 0.9 % (FLUSH) 0.9 %
10 SYRINGE (ML) INJECTION AS NEEDED
Status: DISCONTINUED | OUTPATIENT
Start: 2023-03-20 | End: 2023-03-20 | Stop reason: HOSPADM

## 2023-03-20 RX ORDER — ONDANSETRON 2 MG/ML
4 INJECTION INTRAMUSCULAR; INTRAVENOUS ONCE AS NEEDED
Status: DISCONTINUED | OUTPATIENT
Start: 2023-03-20 | End: 2023-03-20 | Stop reason: HOSPADM

## 2023-03-20 RX ORDER — PROPOFOL 10 MG/ML
VIAL (ML) INTRAVENOUS AS NEEDED
Status: DISCONTINUED | OUTPATIENT
Start: 2023-03-20 | End: 2023-03-20 | Stop reason: SURG

## 2023-03-20 RX ORDER — LIDOCAINE HYDROCHLORIDE 20 MG/ML
INJECTION, SOLUTION EPIDURAL; INFILTRATION; INTRACAUDAL; PERINEURAL AS NEEDED
Status: DISCONTINUED | OUTPATIENT
Start: 2023-03-20 | End: 2023-03-20 | Stop reason: SURG

## 2023-03-20 RX ORDER — LIDOCAINE HYDROCHLORIDE 10 MG/ML
0.5 INJECTION, SOLUTION EPIDURAL; INFILTRATION; INTRACAUDAL; PERINEURAL ONCE AS NEEDED
Status: DISCONTINUED | OUTPATIENT
Start: 2023-03-20 | End: 2023-03-20 | Stop reason: HOSPADM

## 2023-03-20 RX ORDER — SODIUM CHLORIDE 9 MG/ML
500 INJECTION, SOLUTION INTRAVENOUS CONTINUOUS PRN
Status: DISCONTINUED | OUTPATIENT
Start: 2023-03-20 | End: 2023-03-20 | Stop reason: HOSPADM

## 2023-03-20 RX ORDER — SODIUM CHLORIDE 9 MG/ML
1000 INJECTION, SOLUTION INTRAVENOUS CONTINUOUS
Status: DISCONTINUED | OUTPATIENT
Start: 2023-03-20 | End: 2023-03-20 | Stop reason: HOSPADM

## 2023-03-20 RX ADMIN — PROPOFOL INJECTABLE EMULSION 50 MG: 10 INJECTION, EMULSION INTRAVENOUS at 10:48

## 2023-03-20 RX ADMIN — PROPOFOL INJECTABLE EMULSION 50 MG: 10 INJECTION, EMULSION INTRAVENOUS at 10:45

## 2023-03-20 RX ADMIN — PROPOFOL INJECTABLE EMULSION 50 MG: 10 INJECTION, EMULSION INTRAVENOUS at 10:57

## 2023-03-20 RX ADMIN — PROPOFOL INJECTABLE EMULSION 50 MG: 10 INJECTION, EMULSION INTRAVENOUS at 11:09

## 2023-03-20 RX ADMIN — PROPOFOL INJECTABLE EMULSION 50 MG: 10 INJECTION, EMULSION INTRAVENOUS at 11:02

## 2023-03-20 RX ADMIN — PROPOFOL INJECTABLE EMULSION 50 MG: 10 INJECTION, EMULSION INTRAVENOUS at 10:51

## 2023-03-20 RX ADMIN — PROPOFOL INJECTABLE EMULSION 50 MG: 10 INJECTION, EMULSION INTRAVENOUS at 11:00

## 2023-03-20 RX ADMIN — PROPOFOL INJECTABLE EMULSION 50 MG: 10 INJECTION, EMULSION INTRAVENOUS at 10:55

## 2023-03-20 RX ADMIN — PROPOFOL INJECTABLE EMULSION 50 MG: 10 INJECTION, EMULSION INTRAVENOUS at 11:05

## 2023-03-20 RX ADMIN — LIDOCAINE HYDROCHLORIDE 100 MG: 20 INJECTION, SOLUTION EPIDURAL; INFILTRATION; INTRACAUDAL; PERINEURAL at 10:45

## 2023-03-20 RX ADMIN — PROPOFOL INJECTABLE EMULSION 50 MG: 10 INJECTION, EMULSION INTRAVENOUS at 10:46

## 2023-03-20 RX ADMIN — SODIUM CHLORIDE 500 ML: 9 INJECTION, SOLUTION INTRAVENOUS at 10:01

## 2023-03-20 RX ADMIN — PROPOFOL INJECTABLE EMULSION 50 MG: 10 INJECTION, EMULSION INTRAVENOUS at 11:06

## 2023-03-20 RX ADMIN — PROPOFOL INJECTABLE EMULSION 50 MG: 10 INJECTION, EMULSION INTRAVENOUS at 10:53

## 2023-03-20 RX ADMIN — PROPOFOL INJECTABLE EMULSION 50 MG: 10 INJECTION, EMULSION INTRAVENOUS at 10:49

## 2023-03-20 RX ADMIN — PROPOFOL INJECTABLE EMULSION 50 MG: 10 INJECTION, EMULSION INTRAVENOUS at 10:59

## 2023-03-20 NOTE — ANESTHESIA POSTPROCEDURE EVALUATION
Patient: Hernandez Pizarro    Procedure Summary     Date: 03/20/23 Room / Location: [facility] ENDOSCOPY 4 / [facility] ENDOSCOPY    Anesthesia Start: 1037 Anesthesia Stop: 1114    Procedure: COLONOSCOPY WITH ANESTHESIA Diagnosis:       Change in bowel habits      BRBPR (bright red blood per rectum)      (Change in bowel habits [R19.4])      (BRBPR (bright red blood per rectum) [K62.5])    Surgeons: Solo Chirinos MD Provider: Darek Staton CRNA    Anesthesia Type: MAC ASA Status: 3          Anesthesia Type: MAC    Vitals  Vitals Value Taken Time   /61 03/20/23 1116   Temp     Pulse 70 03/20/23 1121   Resp 13 03/20/23 1115   SpO2 95 % 03/20/23 1121   Vitals shown include unvalidated device data.        Post Anesthesia Care and Evaluation    Patient location during evaluation: PHASE II  Patient participation: complete - patient participated  Level of consciousness: awake and alert  Pain score: 0  Pain management: adequate    Airway patency: patent  Anesthetic complications: No anesthetic complications  PONV Status: none  Cardiovascular status: acceptable  Respiratory status: acceptable  Hydration status: acceptable  No anesthesia care post op

## 2023-03-20 NOTE — ANESTHESIA PREPROCEDURE EVALUATION
Anesthesia Evaluation     Patient summary reviewed   no history of anesthetic complications:  NPO Solid Status: > 8 hours             Airway   Mallampati: II  Dental      Pulmonary    (+) sleep apnea on CPAP,     ROS comment: Pulmonary HTN  Cardiovascular   Exercise tolerance: good (4-7 METS)    (+) hypertension,       Neuro/Psych- negative ROS  GI/Hepatic/Renal/Endo    (+) obesity,  GERD,  liver disease,     Musculoskeletal     Abdominal    Substance History      OB/GYN          Other                        Anesthesia Plan    ASA 3     MAC       Anesthetic plan, risks, benefits, and alternatives have been provided, discussed and informed consent has been obtained with: patient.        CODE STATUS:        1.69

## 2023-03-21 LAB
CYTO UR: NORMAL
LAB AP CASE REPORT: NORMAL
Lab: NORMAL
PATH REPORT.FINAL DX SPEC: NORMAL
PATH REPORT.GROSS SPEC: NORMAL

## 2023-08-18 ENCOUNTER — OFFICE VISIT (OUTPATIENT)
Dept: FAMILY MEDICINE CLINIC | Facility: CLINIC | Age: 43
End: 2023-08-18
Payer: COMMERCIAL

## 2023-08-18 VITALS
OXYGEN SATURATION: 97 % | WEIGHT: 315 LBS | RESPIRATION RATE: 18 BRPM | HEIGHT: 71 IN | TEMPERATURE: 97.7 F | DIASTOLIC BLOOD PRESSURE: 86 MMHG | SYSTOLIC BLOOD PRESSURE: 132 MMHG | HEART RATE: 75 BPM | BODY MASS INDEX: 44.1 KG/M2

## 2023-08-18 DIAGNOSIS — K46.9 ABDOMINAL HERNIA WITHOUT OBSTRUCTION AND WITHOUT GANGRENE, RECURRENCE NOT SPECIFIED, UNSPECIFIED HERNIA TYPE: Primary | ICD-10-CM

## 2023-08-18 DIAGNOSIS — I10 ESSENTIAL HYPERTENSION: ICD-10-CM

## 2023-08-18 DIAGNOSIS — R60.9 PERIPHERAL EDEMA: ICD-10-CM

## 2023-08-18 PROCEDURE — 99214 OFFICE O/P EST MOD 30 MIN: CPT | Performed by: NURSE PRACTITIONER

## 2023-08-18 RX ORDER — POTASSIUM CHLORIDE 750 MG/1
10 TABLET, FILM COATED, EXTENDED RELEASE ORAL 2 TIMES DAILY
Qty: 60 TABLET | Refills: 5 | Status: SHIPPED | OUTPATIENT
Start: 2023-08-18

## 2023-08-18 RX ORDER — FUROSEMIDE 20 MG/1
20 TABLET ORAL 2 TIMES DAILY
Qty: 60 TABLET | Refills: 5 | Status: SHIPPED | OUTPATIENT
Start: 2023-08-18

## 2023-08-18 NOTE — PROGRESS NOTES
Subjective   Chief Complaint:  Abdominal discomfort    History of Present Illness:  This 43 y.o. male was seen in the office today.      The patient presents today with abdominal discomfort. He reports acute onset several months ago, but has been getting worse. He reports that when he lays certain ways, various bulges will come out of his abdomen, mainly upper mid abdomen and left upper abdomen.     He has a history of high blood pressure. He has had Lasix as needed before for swelling. He reports that he had not had it refilled in a while because he had a period of time where he did not need it. He inquires if he can get a refill on his Lasix and potassium today.    No Known Allergies   Current Outpatient Medications on File Prior to Visit   Medication Sig    amLODIPine (Norvasc) 10 MG tablet Take 1 tablet by mouth Daily.    Unable to find 1 each 1 (One) Time. Tuddca    biosalt supplement   500mg BID     No current facility-administered medications on file prior to visit.      Past Medical, Surgical, Social, and Family History:  Past Medical History:   Diagnosis Date    Asthma 2007    Mild COPD    Cholelithiasis 1996    Surgically removed    COPD (chronic obstructive pulmonary disease)     Encephalitis     Hypertension     Liver fibrosis     stage 3    Low back pain     Obesity All my life    Pancreatitis     Pulmonary hypertension     patient states he haad a tralee event while giving blood and went into pulmonary htn    Scoliosis 1987    Sleep apnea     cpap    Thoracic back pain      Past Surgical History:   Procedure Laterality Date    CHOLECYSTECTOMY      COLONOSCOPY N/A 3/20/2023    Procedure: COLONOSCOPY WITH ANESTHESIA;  Surgeon: Solo Chirinos MD;  Location: [facility] ENDOSCOPY;  Service: Gastroenterology;  Laterality: N/A;  preop; BRBPR  postop; polyps   PCP Frandy Park     ENDOSCOPY N/A 6/17/2020    Procedure: ESOPHAGOGASTRODUODENOSCOPY WITH ANESTHESIA;  Surgeon: Solo Chirinos MD;  Location:   "[facility] ENDOSCOPY;  Service: Gastroenterology;  Laterality: N/A;  preop; acid reflux; abdominal pain; difficulty swallowing  postop  Frandy Park MD    SINUS SURGERY       Social History     Socioeconomic History    Marital status:      Spouse name: Yudelka    Number of children: 2    Years of education: 14    Highest education level: Some college, no degree   Tobacco Use    Smoking status: Never    Smokeless tobacco: Never   Vaping Use    Vaping Use: Never used   Substance and Sexual Activity    Alcohol use: No    Drug use: No    Sexual activity: Yes     Partners: Female     Birth control/protection: Surgical, Abstinence, None, Hysterectomy     Family History   Problem Relation Age of Onset    Colon cancer Father     Cancer Father             Early death Father             Liver disease Mother             Early death Mother             Heart disease Mother             Asthma Sister     Alcohol abuse Maternal Grandfather             Cancer Maternal Grandfather             Vision loss Maternal Grandfather     Cancer Maternal Grandmother             Miscarriages / Stillbirths Maternal Grandmother     Alcohol abuse Paternal Grandfather             Diabetes Paternal Grandmother     Anxiety disorder Daughter     Birth defects Daughter     Developmental Disability Daughter     Learning disabilities Daughter     Mental illness Daughter     Asthma Sister     Birth defects Son     Developmental Disability Son     Learning disabilities Son     Mental illness Son      Objective   Vital Signs  /86 (BP Location: Left arm, Patient Position: Sitting, Cuff Size: Adult)   Pulse 75   Temp 97.7 øF (36.5 øC) (Infrared)   Resp 18   Ht 180.3 cm (71\")   Wt (!) 157 kg (347 lb)   SpO2 97%   BMI 48.40 kg/mý     Physical Exam  Constitutional:       General: He is not in acute distress.     Appearance: He is obese.   Cardiovascular:      Rate and " Rhythm: Normal rate and regular rhythm.      Pulses: Normal pulses.      Heart sounds: No murmur heard.    No friction rub. No gallop.   Pulmonary:      Effort: Pulmonary effort is normal. No respiratory distress.      Breath sounds: Normal breath sounds. No wheezing or rhonchi.   Abdominal:      Palpations: Abdomen is soft.      Tenderness: There is no abdominal tenderness.   Musculoskeletal:      Right lower leg: Edema present.      Left lower leg: Edema present.   Neurological:      Mental Status: He is alert.     Prior Visit Notes/Records, Lab, Imaging, and Diagnostic Results Reviewed:  CBC:No results for input(s): WBC, HGB, HCT, PLT, IRONSERUM, IRON in the last 67452 hours.   Chemistry:No results for input(s): NA, K, CL, CO2, GLUCOSE, BUN, CREATININE, EGFRIFNONA, EGFRIFAFRI, EGFRRESULT, CALCIUM in the last 57007 hours.  BMI Trend:  BMI Readings from Last 10 Encounters:   08/18/23 48.40 kg/mý   03/20/23 46.30 kg/mý   02/02/23 47.56 kg/mý   01/24/23 48.12 kg/mý   01/13/22 48.20 kg/mý   12/16/21 47.98 kg/mý   12/08/21 46.86 kg/mý   12/02/21 46.86 kg/mý   07/29/21 44.88 kg/mý   07/28/21 44.91 kg/mý     Assessment & Plan   Diagnoses and all orders for this visit:    1. Abdominal hernia without obstruction and without gangrene, recurrence not specified, unspecified hernia type (Primary)  -     CT Abdomen Pelvis Without Contrast; Future    2. Essential hypertension    3. Peripheral edema    Other orders  -     furosemide (Lasix) 20 MG tablet; Take 1 tablet by mouth 2 (Two) Times a Day.  Dispense: 60 tablet; Refill: 5  -     potassium chloride 10 MEQ CR tablet; Take 1 tablet by mouth 2 (Two) Times a Day.  Dispense: 60 tablet; Refill: 5    Discussion:  Advised and educated plan of care. Advised imaging to follow.      Follow-up:  Return for follow-up as needed pending results - we will call.    Transcribed from ambient dictation for SCOOTER Stone by Bravo Caruso.  08/18/23   09:54 CDT    I have personally  performed the services described in this document as transcribed by the above individual, and it is both accurate and complete.    Electronically signed by Chema Varela, 08/18/23, 9:54 AM CDT.

## 2023-12-19 DIAGNOSIS — I10 ESSENTIAL HYPERTENSION: ICD-10-CM

## 2023-12-20 RX ORDER — AMLODIPINE BESYLATE 10 MG/1
10 TABLET ORAL DAILY
Qty: 90 TABLET | Refills: 1 | Status: SHIPPED | OUTPATIENT
Start: 2023-12-20

## 2024-03-10 DIAGNOSIS — I10 ESSENTIAL HYPERTENSION: ICD-10-CM

## 2024-03-11 RX ORDER — POTASSIUM CHLORIDE 750 MG/1
10 TABLET, FILM COATED, EXTENDED RELEASE ORAL 2 TIMES DAILY
Qty: 60 TABLET | Refills: 5 | Status: SHIPPED | OUTPATIENT
Start: 2024-03-11

## 2024-03-11 RX ORDER — AMLODIPINE BESYLATE 10 MG/1
10 TABLET ORAL DAILY
Qty: 90 TABLET | Refills: 1 | Status: SHIPPED | OUTPATIENT
Start: 2024-03-11

## 2024-03-11 NOTE — TELEPHONE ENCOUNTER
Rx Refill Note  Requested Prescriptions     Pending Prescriptions Disp Refills    amLODIPine (NORVASC) 10 MG tablet 90 tablet 1     Sig: Take 1 tablet by mouth Daily.      Last office visit with office: 08/18/23  Next office visit with office: none    UDS: 06/04/2020    DATE OF LAST REFILL: 12/20/23    Controlled Substance Agreement: not up to date    AURELIANO OR JAREDP: N/A         {TIP  Is Refill Pharmacy correct?:  Ann Marie Chow MA  03/11/24, 09:51 CDT

## 2024-10-01 ENCOUNTER — APPOINTMENT (OUTPATIENT)
Dept: GENERAL RADIOLOGY | Facility: HOSPITAL | Age: 44
End: 2024-10-01
Payer: COMMERCIAL

## 2024-10-01 PROCEDURE — 72110 X-RAY EXAM L-2 SPINE 4/>VWS: CPT

## 2024-12-22 SDOH — HEALTH STABILITY: PHYSICAL HEALTH: ON AVERAGE, HOW MANY MINUTES DO YOU ENGAGE IN EXERCISE AT THIS LEVEL?: PATIENT DECLINED

## 2024-12-22 SDOH — HEALTH STABILITY: PHYSICAL HEALTH: ON AVERAGE, HOW MANY DAYS PER WEEK DO YOU ENGAGE IN MODERATE TO STRENUOUS EXERCISE (LIKE A BRISK WALK)?: 5 DAYS

## 2024-12-23 ENCOUNTER — OFFICE VISIT (OUTPATIENT)
Dept: PRIMARY CARE CLINIC | Age: 44
End: 2024-12-23
Payer: COMMERCIAL

## 2024-12-23 VITALS
HEIGHT: 71 IN | HEART RATE: 74 BPM | SYSTOLIC BLOOD PRESSURE: 138 MMHG | RESPIRATION RATE: 18 BRPM | DIASTOLIC BLOOD PRESSURE: 88 MMHG | WEIGHT: 315 LBS | TEMPERATURE: 97.6 F | OXYGEN SATURATION: 98 % | BODY MASS INDEX: 44.1 KG/M2

## 2024-12-23 DIAGNOSIS — K76.0 STEATOSIS OF LIVER: ICD-10-CM

## 2024-12-23 DIAGNOSIS — I10 PRIMARY HYPERTENSION: ICD-10-CM

## 2024-12-23 DIAGNOSIS — Z76.89 ENCOUNTER TO ESTABLISH CARE: Primary | ICD-10-CM

## 2024-12-23 PROCEDURE — 3075F SYST BP GE 130 - 139MM HG: CPT | Performed by: FAMILY MEDICINE

## 2024-12-23 PROCEDURE — 3079F DIAST BP 80-89 MM HG: CPT | Performed by: FAMILY MEDICINE

## 2024-12-23 PROCEDURE — 99204 OFFICE O/P NEW MOD 45 MIN: CPT | Performed by: FAMILY MEDICINE

## 2024-12-23 RX ORDER — AMLODIPINE BESYLATE 10 MG/1
10 TABLET ORAL DAILY
COMMUNITY
Start: 2024-03-11 | End: 2024-12-23 | Stop reason: SDUPTHER

## 2024-12-23 RX ORDER — AMLODIPINE BESYLATE 10 MG/1
10 TABLET ORAL DAILY
Qty: 30 TABLET | Refills: 3 | Status: SHIPPED | OUTPATIENT
Start: 2024-12-23

## 2024-12-23 RX ORDER — POTASSIUM CHLORIDE 750 MG/1
10 TABLET, EXTENDED RELEASE ORAL 2 TIMES DAILY
COMMUNITY
Start: 2024-03-11

## 2024-12-23 SDOH — ECONOMIC STABILITY: FOOD INSECURITY: WITHIN THE PAST 12 MONTHS, YOU WORRIED THAT YOUR FOOD WOULD RUN OUT BEFORE YOU GOT MONEY TO BUY MORE.: NEVER TRUE

## 2024-12-23 SDOH — ECONOMIC STABILITY: FOOD INSECURITY: WITHIN THE PAST 12 MONTHS, THE FOOD YOU BOUGHT JUST DIDN'T LAST AND YOU DIDN'T HAVE MONEY TO GET MORE.: NEVER TRUE

## 2024-12-23 SDOH — ECONOMIC STABILITY: INCOME INSECURITY: HOW HARD IS IT FOR YOU TO PAY FOR THE VERY BASICS LIKE FOOD, HOUSING, MEDICAL CARE, AND HEATING?: NOT HARD AT ALL

## 2024-12-23 ASSESSMENT — ANXIETY QUESTIONNAIRES
2. NOT BEING ABLE TO STOP OR CONTROL WORRYING: NOT AT ALL
4. TROUBLE RELAXING: SEVERAL DAYS
7. FEELING AFRAID AS IF SOMETHING AWFUL MIGHT HAPPEN: NOT AT ALL
1. FEELING NERVOUS, ANXIOUS, OR ON EDGE: NOT AT ALL
6. BECOMING EASILY ANNOYED OR IRRITABLE: SEVERAL DAYS
GAD7 TOTAL SCORE: 2
5. BEING SO RESTLESS THAT IT IS HARD TO SIT STILL: NOT AT ALL
3. WORRYING TOO MUCH ABOUT DIFFERENT THINGS: NOT AT ALL
IF YOU CHECKED OFF ANY PROBLEMS ON THIS QUESTIONNAIRE, HOW DIFFICULT HAVE THESE PROBLEMS MADE IT FOR YOU TO DO YOUR WORK, TAKE CARE OF THINGS AT HOME, OR GET ALONG WITH OTHER PEOPLE: SOMEWHAT DIFFICULT

## 2024-12-23 ASSESSMENT — PATIENT HEALTH QUESTIONNAIRE - PHQ9
SUM OF ALL RESPONSES TO PHQ QUESTIONS 1-9: 4
4. FEELING TIRED OR HAVING LITTLE ENERGY: SEVERAL DAYS
10. IF YOU CHECKED OFF ANY PROBLEMS, HOW DIFFICULT HAVE THESE PROBLEMS MADE IT FOR YOU TO DO YOUR WORK, TAKE CARE OF THINGS AT HOME, OR GET ALONG WITH OTHER PEOPLE: SOMEWHAT DIFFICULT
9. THOUGHTS THAT YOU WOULD BE BETTER OFF DEAD, OR OF HURTING YOURSELF: NOT AT ALL
5. POOR APPETITE OR OVEREATING: NOT AT ALL
SUM OF ALL RESPONSES TO PHQ QUESTIONS 1-9: 4
1. LITTLE INTEREST OR PLEASURE IN DOING THINGS: SEVERAL DAYS
7. TROUBLE CONCENTRATING ON THINGS, SUCH AS READING THE NEWSPAPER OR WATCHING TELEVISION: SEVERAL DAYS
2. FEELING DOWN, DEPRESSED OR HOPELESS: NOT AT ALL
SUM OF ALL RESPONSES TO PHQ QUESTIONS 1-9: 4
8. MOVING OR SPEAKING SO SLOWLY THAT OTHER PEOPLE COULD HAVE NOTICED. OR THE OPPOSITE, BEING SO FIGETY OR RESTLESS THAT YOU HAVE BEEN MOVING AROUND A LOT MORE THAN USUAL: NOT AT ALL
SUM OF ALL RESPONSES TO PHQ9 QUESTIONS 1 & 2: 1
3. TROUBLE FALLING OR STAYING ASLEEP: SEVERAL DAYS
6. FEELING BAD ABOUT YOURSELF - OR THAT YOU ARE A FAILURE OR HAVE LET YOURSELF OR YOUR FAMILY DOWN: NOT AT ALL
SUM OF ALL RESPONSES TO PHQ QUESTIONS 1-9: 4

## 2024-12-23 ASSESSMENT — ENCOUNTER SYMPTOMS
ABDOMINAL PAIN: 0
WHEEZING: 0
BLOOD IN STOOL: 0
SHORTNESS OF BREATH: 0
CHEST TIGHTNESS: 0

## 2024-12-23 NOTE — PROGRESS NOTES
Systems   Constitutional:  Negative for activity change and fever.   HENT:  Negative for congestion.    Eyes:  Negative for visual disturbance.   Respiratory:  Negative for chest tightness, shortness of breath and wheezing.    Cardiovascular:  Negative for chest pain.   Gastrointestinal:  Negative for abdominal pain and blood in stool.   Genitourinary:  Negative for difficulty urinating and urgency.   Musculoskeletal:  Positive for myalgias.   Neurological:  Negative for weakness and headaches.   Psychiatric/Behavioral:  Negative for confusion.           Objective   Physical Exam  Vitals reviewed.   Constitutional:       General: He is not in acute distress.     Appearance: Normal appearance. He is not ill-appearing.   HENT:      Head: Normocephalic and atraumatic.   Cardiovascular:      Rate and Rhythm: Normal rate and regular rhythm.      Pulses: Normal pulses.      Heart sounds: Normal heart sounds. No murmur heard.  Pulmonary:      Effort: Pulmonary effort is normal. No respiratory distress.      Breath sounds: Normal breath sounds. No wheezing or rhonchi.   Chest:      Chest wall: No tenderness.   Abdominal:      General: Abdomen is flat. Bowel sounds are normal. There is no distension.      Palpations: Abdomen is soft.      Tenderness: There is no abdominal tenderness.   Musculoskeletal:         General: No swelling.   Skin:     General: Skin is warm and dry.   Neurological:      General: No focal deficit present.      Mental Status: He is alert.            Vitals:    12/23/24 0844   BP: 138/88   Pulse: 74   Resp: 18   Temp: 97.6 °F (36.4 °C)   SpO2: 98%        Current Outpatient Medications   Medication Sig Dispense Refill    potassium chloride (KLOR-CON) 10 MEQ extended release tablet Take 1 tablet by mouth 2 times daily      amLODIPine (NORVASC) 10 MG tablet Take 1 tablet by mouth daily 30 tablet 3     No current facility-administered medications for this visit.      Family History   Problem Relation Age of

## 2024-12-26 ENCOUNTER — LAB (OUTPATIENT)
Dept: PRIMARY CARE CLINIC | Age: 44
End: 2024-12-26

## 2024-12-26 DIAGNOSIS — Z76.89 ENCOUNTER TO ESTABLISH CARE: ICD-10-CM

## 2024-12-26 LAB
ALBUMIN SERPL-MCNC: 4 G/DL (ref 3.5–5.2)
ALP SERPL-CCNC: 126 U/L (ref 40–129)
ALT SERPL-CCNC: 34 U/L (ref 5–41)
ANION GAP SERPL CALCULATED.3IONS-SCNC: 12 MMOL/L (ref 7–19)
AST SERPL-CCNC: 24 U/L (ref 5–40)
BASOPHILS # BLD: 0.1 K/UL (ref 0–0.2)
BASOPHILS NFR BLD: 0.8 % (ref 0–1)
BILIRUB SERPL-MCNC: 0.4 MG/DL (ref 0.2–1.2)
BUN SERPL-MCNC: 17 MG/DL (ref 6–20)
CALCIUM SERPL-MCNC: 8.6 MG/DL (ref 8.6–10)
CHLORIDE SERPL-SCNC: 103 MMOL/L (ref 98–111)
CHOLEST SERPL-MCNC: 170 MG/DL (ref 0–199)
CO2 SERPL-SCNC: 24 MMOL/L (ref 22–29)
CREAT SERPL-MCNC: 0.9 MG/DL (ref 0.7–1.2)
EOSINOPHIL # BLD: 0.2 K/UL (ref 0–0.6)
EOSINOPHIL NFR BLD: 3.3 % (ref 0–5)
ERYTHROCYTE [DISTWIDTH] IN BLOOD BY AUTOMATED COUNT: 13 % (ref 11.5–14.5)
GLUCOSE SERPL-MCNC: 89 MG/DL (ref 70–99)
HCT VFR BLD AUTO: 44.5 % (ref 42–52)
HCV AB SERPL QL IA: NORMAL
HDLC SERPL-MCNC: 45 MG/DL (ref 40–60)
HGB BLD-MCNC: 14.7 G/DL (ref 14–18)
HIV-1 P24 AG: NORMAL
HIV1+2 AB SERPLBLD QL IA.RAPID: NORMAL
IMM GRANULOCYTES # BLD: 0 K/UL
LDLC SERPL CALC-MCNC: 106 MG/DL
LYMPHOCYTES # BLD: 2.5 K/UL (ref 1.1–4.5)
LYMPHOCYTES NFR BLD: 39.3 % (ref 20–40)
MCH RBC QN AUTO: 28.6 PG (ref 27–31)
MCHC RBC AUTO-ENTMCNC: 33 G/DL (ref 33–37)
MCV RBC AUTO: 86.6 FL (ref 80–94)
MONOCYTES # BLD: 0.5 K/UL (ref 0–0.9)
MONOCYTES NFR BLD: 7.9 % (ref 0–10)
NEUTROPHILS # BLD: 3.1 K/UL (ref 1.5–7.5)
NEUTS SEG NFR BLD: 48.5 % (ref 50–65)
PLATELET # BLD AUTO: 199 K/UL (ref 130–400)
PMV BLD AUTO: 12 FL (ref 9.4–12.4)
POTASSIUM SERPL-SCNC: 4.1 MMOL/L (ref 3.5–5)
PROT SERPL-MCNC: 7 G/DL (ref 6.4–8.3)
RBC # BLD AUTO: 5.14 M/UL (ref 4.7–6.1)
SODIUM SERPL-SCNC: 139 MMOL/L (ref 136–145)
TRIGL SERPL-MCNC: 95 MG/DL (ref 0–149)
WBC # BLD AUTO: 6.3 K/UL (ref 4.8–10.8)

## 2025-01-27 ENCOUNTER — OFFICE VISIT (OUTPATIENT)
Dept: PRIMARY CARE CLINIC | Age: 45
End: 2025-01-27
Payer: COMMERCIAL

## 2025-01-27 VITALS
OXYGEN SATURATION: 96 % | WEIGHT: 315 LBS | SYSTOLIC BLOOD PRESSURE: 122 MMHG | HEART RATE: 76 BPM | HEIGHT: 71 IN | RESPIRATION RATE: 18 BRPM | TEMPERATURE: 98.2 F | DIASTOLIC BLOOD PRESSURE: 80 MMHG | BODY MASS INDEX: 44.1 KG/M2

## 2025-01-27 DIAGNOSIS — K52.9 GASTROENTERITIS: ICD-10-CM

## 2025-01-27 DIAGNOSIS — R19.7 DIARRHEA, UNSPECIFIED TYPE: Primary | ICD-10-CM

## 2025-01-27 PROCEDURE — 99213 OFFICE O/P EST LOW 20 MIN: CPT | Performed by: FAMILY MEDICINE

## 2025-01-27 RX ORDER — ONDANSETRON 4 MG/1
4 TABLET, ORALLY DISINTEGRATING ORAL 3 TIMES DAILY PRN
Qty: 21 TABLET | Refills: 0 | Status: SHIPPED | OUTPATIENT
Start: 2025-01-27

## 2025-01-27 SDOH — ECONOMIC STABILITY: INCOME INSECURITY: IN THE LAST 12 MONTHS, WAS THERE A TIME WHEN YOU WERE NOT ABLE TO PAY THE MORTGAGE OR RENT ON TIME?: NO

## 2025-01-27 SDOH — ECONOMIC STABILITY: FOOD INSECURITY: WITHIN THE PAST 12 MONTHS, YOU WORRIED THAT YOUR FOOD WOULD RUN OUT BEFORE YOU GOT MONEY TO BUY MORE.: NEVER TRUE

## 2025-01-27 SDOH — ECONOMIC STABILITY: FOOD INSECURITY: WITHIN THE PAST 12 MONTHS, THE FOOD YOU BOUGHT JUST DIDN'T LAST AND YOU DIDN'T HAVE MONEY TO GET MORE.: NEVER TRUE

## 2025-01-27 SDOH — ECONOMIC STABILITY: TRANSPORTATION INSECURITY
IN THE PAST 12 MONTHS, HAS LACK OF TRANSPORTATION KEPT YOU FROM MEETINGS, WORK, OR FROM GETTING THINGS NEEDED FOR DAILY LIVING?: NO

## 2025-01-27 SDOH — ECONOMIC STABILITY: TRANSPORTATION INSECURITY
IN THE PAST 12 MONTHS, HAS THE LACK OF TRANSPORTATION KEPT YOU FROM MEDICAL APPOINTMENTS OR FROM GETTING MEDICATIONS?: NO

## 2025-01-27 ASSESSMENT — ENCOUNTER SYMPTOMS
DIARRHEA: 0
ABDOMINAL PAIN: 1
WHEEZING: 0
VOMITING: 0
BLOOD IN STOOL: 1
NAUSEA: 1
SHORTNESS OF BREATH: 0
CHEST TIGHTNESS: 0

## 2025-01-27 ASSESSMENT — PATIENT HEALTH QUESTIONNAIRE - PHQ9
SUM OF ALL RESPONSES TO PHQ9 QUESTIONS 1 & 2: 0
2. FEELING DOWN, DEPRESSED OR HOPELESS: NOT AT ALL
1. LITTLE INTEREST OR PLEASURE IN DOING THINGS: NOT AT ALL
SUM OF ALL RESPONSES TO PHQ QUESTIONS 1-9: 0
SUM OF ALL RESPONSES TO PHQ QUESTIONS 1-9: 0
1. LITTLE INTEREST OR PLEASURE IN DOING THINGS: NOT AT ALL
SUM OF ALL RESPONSES TO PHQ QUESTIONS 1-9: 0
SUM OF ALL RESPONSES TO PHQ9 QUESTIONS 1 & 2: 0
2. FEELING DOWN, DEPRESSED OR HOPELESS: NOT AT ALL
SUM OF ALL RESPONSES TO PHQ QUESTIONS 1-9: 0

## 2025-01-27 NOTE — PROGRESS NOTES
Valentin Stratton (:  1980) is a 44 y.o. male,Established patient, here for evaluation of the following chief complaint(s):  Fever (Patient was having fever with diarrhea started 25 and fever got up to 101.3 and broke on Thursday morning.  Diarrhea still present and constant.  Tries to eat and immediately follows with diarrhea. Nausea denies vomiting.  )      Assessment & Plan   ASSESSMENT/PLAN:  1. Diarrhea, unspecified type  -     MISCELLANEOUS SENDOUT Gastrointestinal + C. Diff  2. Gastroenteritis  -     ondansetron (ZOFRAN-ODT) 4 MG disintegrating tablet; Take 1 tablet by mouth 3 times daily as needed for Nausea or Vomiting, Disp-21 tablet, R-0Normal      Normally I would think that the patient's symptoms are due to a viral gastroenteritis however given that he has had a fever and has experienced some blood in the stool which may or may not be related to a hemorrhoid I do think that we need to go ahead and do a Diatherix panel to better ascertain the exact cause in case patient does require an antibiotic for treatment.  Patient was agreeable to proceeding with this and sample was collected.  Advised patient to drink plenty of fluids in the meantime and we will update him once we have results for further direction of care.  Would advise keeping hands washed to minimize potential for spread.  I have sent through some Retrievean for symptom management of his nausea.  Patient was advised to avoid antidiarrheals in the meantime as this could prolong symptoms        Return if symptoms worsen or fail to improve.         Subjective   SUBJECTIVE/OBJECTIVE:  Valentin Stratton is a 44 y.o. male who presents due to nausea and diarrhea ongoing since Wednesday.  Patient has peaked a fever of 101.3F.  He denies any vomiting as of this point but says he has come close.  Patient says that he has had very runny diarrhea and says he missed his pants in bed earlier today.  He has had some blood in the stool but he thinks that

## 2025-01-28 NOTE — RESULT ENCOUNTER NOTE
Diatherix panel negative.  I do think it would be safe for him to go ahead and use an antidiarrheal if he would like to and try to drink plenty of fluids.  If it still persists after another week or 2 I would recommend coming back in and we can do more specialized testing for parasites if need be though these are lower likelihood.

## 2025-02-01 ENCOUNTER — HOSPITAL ENCOUNTER (EMERGENCY)
Age: 45
Discharge: HOME | End: 2025-02-01
Attending: EMERGENCY MEDICINE
Payer: COMMERCIAL

## 2025-02-01 VITALS
DIASTOLIC BLOOD PRESSURE: 87 MMHG | RESPIRATION RATE: 12 BRPM | SYSTOLIC BLOOD PRESSURE: 138 MMHG | OXYGEN SATURATION: 95 % | HEART RATE: 59 BPM | TEMPERATURE: 98.1 F

## 2025-02-01 DIAGNOSIS — R00.2 PALPITATIONS: Primary | ICD-10-CM

## 2025-02-01 DIAGNOSIS — R74.8 ELEVATED LIVER ENZYMES: ICD-10-CM

## 2025-02-01 LAB
ALBUMIN SERPL-MCNC: 3.8 G/DL (ref 3.5–5.2)
ALP SERPL-CCNC: 138 U/L (ref 40–129)
ALT SERPL-CCNC: 72 U/L (ref 5–41)
ANION GAP SERPL CALCULATED.3IONS-SCNC: 13 MMOL/L (ref 8–16)
AST SERPL-CCNC: 55 U/L (ref 5–40)
BASOPHILS # BLD: 0.1 K/UL (ref 0–0.2)
BASOPHILS NFR BLD: 0.5 % (ref 0–1)
BILIRUB SERPL-MCNC: 0.3 MG/DL (ref 0.2–1.2)
BUN SERPL-MCNC: 17 MG/DL (ref 6–20)
CALCIUM SERPL-MCNC: 8.7 MG/DL (ref 8.6–10)
CHLORIDE SERPL-SCNC: 101 MMOL/L (ref 98–107)
CO2 SERPL-SCNC: 22 MMOL/L (ref 22–29)
CREAT SERPL-MCNC: 0.7 MG/DL (ref 0.7–1.2)
EOSINOPHIL # BLD: 0.3 K/UL (ref 0–0.6)
EOSINOPHIL NFR BLD: 3 % (ref 0–5)
ERYTHROCYTE [DISTWIDTH] IN BLOOD BY AUTOMATED COUNT: 13 % (ref 11.5–14.5)
GLUCOSE SERPL-MCNC: 112 MG/DL (ref 70–99)
HCT VFR BLD AUTO: 43 % (ref 42–52)
HGB BLD-MCNC: 14.6 G/DL (ref 14–18)
IMM GRANULOCYTES # BLD: 0 K/UL
LYMPHOCYTES # BLD: 3.2 K/UL (ref 1.1–4.5)
LYMPHOCYTES NFR BLD: 34.3 % (ref 20–40)
MAGNESIUM SERPL-MCNC: 2.4 MG/DL (ref 1.6–2.6)
MCH RBC QN AUTO: 28.5 PG (ref 27–31)
MCHC RBC AUTO-ENTMCNC: 34 G/DL (ref 33–37)
MCV RBC AUTO: 84 FL (ref 80–94)
MONOCYTES # BLD: 0.7 K/UL (ref 0–0.9)
MONOCYTES NFR BLD: 7.8 % (ref 0–10)
NEUTROPHILS # BLD: 5 K/UL (ref 1.5–7.5)
NEUTS SEG NFR BLD: 54 % (ref 50–65)
PLATELET # BLD AUTO: 253 K/UL (ref 130–400)
PMV BLD AUTO: 10.5 FL (ref 9.4–12.4)
POTASSIUM SERPL-SCNC: 4.9 MMOL/L (ref 3.5–5.1)
PROT SERPL-MCNC: 7.3 G/DL (ref 6.4–8.3)
RBC # BLD AUTO: 5.12 M/UL (ref 4.7–6.1)
SODIUM SERPL-SCNC: 136 MMOL/L (ref 136–145)
WBC # BLD AUTO: 9.3 K/UL (ref 4.8–10.8)

## 2025-02-01 PROCEDURE — 93005 ELECTROCARDIOGRAM TRACING: CPT | Performed by: EMERGENCY MEDICINE

## 2025-02-01 PROCEDURE — 85025 COMPLETE CBC W/AUTO DIFF WBC: CPT

## 2025-02-01 PROCEDURE — 83735 ASSAY OF MAGNESIUM: CPT

## 2025-02-01 PROCEDURE — 99284 EMERGENCY DEPT VISIT MOD MDM: CPT

## 2025-02-01 PROCEDURE — 36415 COLL VENOUS BLD VENIPUNCTURE: CPT

## 2025-02-01 PROCEDURE — 80053 COMPREHEN METABOLIC PANEL: CPT

## 2025-02-01 ASSESSMENT — ENCOUNTER SYMPTOMS
NAUSEA: 0
DIARRHEA: 0
ABDOMINAL PAIN: 0
SORE THROAT: 0
SINUS PRESSURE: 0
VOMITING: 0
SHORTNESS OF BREATH: 0
RHINORRHEA: 0

## 2025-02-01 ASSESSMENT — PAIN - FUNCTIONAL ASSESSMENT: PAIN_FUNCTIONAL_ASSESSMENT: NONE - DENIES PAIN

## 2025-02-01 ASSESSMENT — LIFESTYLE VARIABLES: HOW OFTEN DO YOU HAVE A DRINK CONTAINING ALCOHOL: NEVER

## 2025-02-01 NOTE — ED PROVIDER NOTES
[facility] EMERGENCY DEPARTMENT  eMERGENCY dEPARTMENT eNCOUnter      Pt Name: Valentin Stratton  MRN: [id number]  Birthdate 1980  Date of evaluation: 2/1/2025  Provider: Alvaro Bazan MD    CHIEF COMPLAINT       Chief Complaint   Patient presents with    Palpitations     Pt was sick for 6 days of n/v/d. Since recovering pt feels like his heart is skipping beats. Pt admits to trying to increase electrolyte intake.          HISTORY OF PRESENT ILLNESS   (Location/Symptom, Timing/Onset,Context/Setting, Quality, Duration, Modifying Factors, Severity)  Note limiting factors.   Valentin Stratton is a 44 y.o. male who presents to the emergency department palpitations     HPI    Patient is a 44-year-old white male with history of obesity; reflux disease; hypertension; liver disease; who recently had a diarrheal illness and now presents with palpitations.  The diarrhea has subsequently stopped.  He drank a lot of electrolytes.  He is otherwise asymptomatic.  No chest pain.  No nausea or vomiting.  No shortness of breath.    NursingNotes were reviewed.    REVIEW OF SYSTEMS    (2-9 systems for level 4, 10 or more for level 5)     Review of Systems   Constitutional:  Negative for chills, diaphoresis, fatigue and fever.   HENT:  Negative for rhinorrhea, sinus pressure and sore throat.    Eyes:  Negative for visual disturbance.   Respiratory:  Negative for shortness of breath.    Cardiovascular:  Negative for chest pain.   Gastrointestinal:  Negative for abdominal pain, diarrhea, nausea and vomiting.   Genitourinary:  Negative for difficulty urinating and dysuria.   Musculoskeletal:  Negative for arthralgias and myalgias.   Skin:  Negative for rash.   Neurological:  Negative for weakness.   Psychiatric/Behavioral:  Negative for confusion.    All other systems reviewed and are negative.           PAST MEDICALHISTORY     Past Medical History:   Diagnosis Date    GERD (gastroesophageal reflux disease) 1997    Gall bladder removal     Partners: Female     Comment: Wife of 16 years     Social Determinants of Health     Financial Resource Strain: Low Risk  (12/23/2024)    Overall Financial Resource Strain (CARDIA)     Difficulty of Paying Living Expenses: Not hard at all   Food Insecurity: No Food Insecurity (1/27/2025)    Hunger Vital Sign     Worried About Running Out of Food in the Last Year: Never true     Ran Out of Food in the Last Year: Never true   Transportation Needs: No Transportation Needs (1/27/2025)    PRAPARE - Transportation     Lack of Transportation (Medical): No     Lack of Transportation (Non-Medical): No   Physical Activity: Unknown (12/22/2024)    Exercise Vital Sign     Days of Exercise per Week: 5 days     Minutes of Exercise per Session: Patient declined   Intimate Partner Violence: Not At Risk (3/20/2023)    Received from [facility]    Abuse Screen     Feels Unsafe at Home or Work/School: no     Feels Threatened by Someone: no     Does Anyone Try to Keep You From Having Contact with Others or Doing Things Outside Your Home?: no     Physical Signs of Abuse Present: no   Housing Stability: Low Risk  (1/27/2025)    Housing Stability Vital Sign     Unable to Pay for Housing in the Last Year: No     Number of Times Moved in the Last Year: 0     Homeless in the Last Year: No       SCREENINGS    [place] Coma Scale  Eye Opening: Spontaneous  Best Verbal Response: Oriented  Best Motor Response: Obeys commands  Jean Coma Scale Score: 15        PHYSICAL EXAM    (up to 7 for level 4, 8 or more for level 5)     ED Triage Vitals [02/01/25 1134]   BP Systolic BP Percentile Diastolic BP Percentile Temp Temp src Pulse Respirations SpO2   (!) 106/94 -- -- 98.1 °F (36.7 °C) -- 73 18 96 %      Height Weight         -- --             Physical Exam  Vitals and nursing note reviewed.   Constitutional:       Appearance: He is well-developed.   HENT:      Head: Normocephalic and atraumatic.      Nose: Nose normal.

## 2025-02-01 NOTE — DISCHARGE INSTRUCTIONS
Continue with your current medications.  Drink plenty of fluid.  Your EKG was normal in its appearance.  You did have some elevated liver enzymes which may be related to your known fibrosis of the liver.  Follow-up with your primary care doctor for further treatment and evaluation.  Return immediately to the emergency room for any new or worsening symptoms.

## 2025-02-02 LAB
EKG P AXIS: 61 DEGREES
EKG P-R INTERVAL: 166 MS
EKG Q-T INTERVAL: 382 MS
EKG QRS DURATION: 82 MS
EKG QTC CALCULATION (BAZETT): 390 MS
EKG T AXIS: 34 DEGREES

## 2025-02-02 PROCEDURE — 93010 ELECTROCARDIOGRAM REPORT: CPT | Performed by: INTERNAL MEDICINE

## 2025-02-03 ENCOUNTER — OFFICE VISIT (OUTPATIENT)
Dept: PRIMARY CARE CLINIC | Age: 45
End: 2025-02-03
Payer: COMMERCIAL

## 2025-02-03 ENCOUNTER — HOSPITAL ENCOUNTER (OUTPATIENT)
Age: 45
Discharge: HOME | End: 2025-02-05
Payer: COMMERCIAL

## 2025-02-03 VITALS
WEIGHT: 315 LBS | RESPIRATION RATE: 18 BRPM | OXYGEN SATURATION: 98 % | DIASTOLIC BLOOD PRESSURE: 88 MMHG | SYSTOLIC BLOOD PRESSURE: 138 MMHG | HEART RATE: 73 BPM | TEMPERATURE: 97.8 F | HEIGHT: 71 IN | BODY MASS INDEX: 44.1 KG/M2

## 2025-02-03 DIAGNOSIS — R00.2 PALPITATIONS: ICD-10-CM

## 2025-02-03 DIAGNOSIS — K76.0 STEATOSIS OF LIVER: ICD-10-CM

## 2025-02-03 DIAGNOSIS — I10 PRIMARY HYPERTENSION: ICD-10-CM

## 2025-02-03 DIAGNOSIS — R00.2 PALPITATIONS: Primary | ICD-10-CM

## 2025-02-03 LAB — ECHO BSA: 2.89 M2

## 2025-02-03 PROCEDURE — 3075F SYST BP GE 130 - 139MM HG: CPT | Performed by: FAMILY MEDICINE

## 2025-02-03 PROCEDURE — 93246 EXT ECG>7D<15D RECORDING: CPT

## 2025-02-03 PROCEDURE — 3079F DIAST BP 80-89 MM HG: CPT | Performed by: FAMILY MEDICINE

## 2025-02-03 PROCEDURE — 99214 OFFICE O/P EST MOD 30 MIN: CPT | Performed by: FAMILY MEDICINE

## 2025-02-03 RX ORDER — LISINOPRIL 20 MG/1
20 TABLET ORAL DAILY
Qty: 30 TABLET | Refills: 5 | Status: SHIPPED | OUTPATIENT
Start: 2025-02-03

## 2025-02-03 ASSESSMENT — ENCOUNTER SYMPTOMS
DIARRHEA: 0
CHEST TIGHTNESS: 0
SHORTNESS OF BREATH: 1
COUGH: 0
NAUSEA: 0
WHEEZING: 0
BLOOD IN STOOL: 0
ABDOMINAL PAIN: 0

## 2025-02-03 NOTE — PROGRESS NOTES
typically gets some shortness of breath as well though denies any chest pain.  Patient says this occurs at random and does not seem to follow any particular pattern.  He has had some lightheadedness.  Patient says he has never had the symptoms before though has been on amlodipine for several years.        Palpitations   This is a new problem. The current episode started in the past 7 days. The problem has been resolved. Associated symptoms include shortness of breath. Pertinent negatives include no chest pain, coughing, fever, nausea, syncope or weakness.       Review of Systems   Constitutional:  Negative for activity change and fever.   HENT:  Negative for congestion.    Eyes:  Negative for visual disturbance.   Respiratory:  Positive for shortness of breath. Negative for cough, chest tightness and wheezing.    Cardiovascular:  Positive for palpitations. Negative for chest pain and syncope.   Gastrointestinal:  Negative for abdominal pain, blood in stool, diarrhea and nausea.   Genitourinary:  Negative for difficulty urinating and urgency.   Neurological:  Positive for light-headedness. Negative for weakness and headaches.   Psychiatric/Behavioral:  Negative for confusion.           Objective   Physical Exam  Vitals reviewed.   Constitutional:       General: He is not in acute distress.     Appearance: Normal appearance. He is not ill-appearing.   HENT:      Head: Normocephalic and atraumatic.   Cardiovascular:      Rate and Rhythm: Normal rate and regular rhythm.      Pulses: Normal pulses.      Heart sounds: Normal heart sounds. No murmur heard.  Pulmonary:      Effort: Pulmonary effort is normal. No respiratory distress.      Breath sounds: Normal breath sounds. No wheezing or rhonchi.   Chest:      Chest wall: No tenderness.   Abdominal:      General: Abdomen is flat. Bowel sounds are normal. There is no distension.      Palpations: Abdomen is soft.      Tenderness: There is no abdominal tenderness.

## 2025-02-13 ENCOUNTER — OFFICE VISIT (OUTPATIENT)
Age: 45
End: 2025-02-13
Payer: COMMERCIAL

## 2025-02-13 VITALS
RESPIRATION RATE: 20 BRPM | TEMPERATURE: 98 F | HEART RATE: 80 BPM | DIASTOLIC BLOOD PRESSURE: 68 MMHG | WEIGHT: 315 LBS | SYSTOLIC BLOOD PRESSURE: 120 MMHG | OXYGEN SATURATION: 95 % | HEIGHT: 71 IN | BODY MASS INDEX: 44.1 KG/M2

## 2025-02-13 DIAGNOSIS — H65.93 MIDDLE EAR EFFUSION, BILATERAL: ICD-10-CM

## 2025-02-13 DIAGNOSIS — J02.0 ACUTE STREPTOCOCCAL PHARYNGITIS: Primary | ICD-10-CM

## 2025-02-13 DIAGNOSIS — U07.1 COVID: ICD-10-CM

## 2025-02-13 DIAGNOSIS — R05.9 COUGH, UNSPECIFIED TYPE: ICD-10-CM

## 2025-02-13 DIAGNOSIS — Z11.59 SCREENING FOR VIRAL DISEASE: ICD-10-CM

## 2025-02-13 DIAGNOSIS — J02.9 SORE THROAT: ICD-10-CM

## 2025-02-13 LAB
ECHO BSA: 2.89 M2
INFLUENZA A ANTIBODY: NORMAL
INFLUENZA B ANTIBODY: NORMAL
Lab: ABNORMAL
QC PASS/FAIL: ABNORMAL
S PYO AG THROAT QL: POSITIVE
SARS-COV-2, POC: DETECTED

## 2025-02-13 PROCEDURE — 87804 INFLUENZA ASSAY W/OPTIC: CPT

## 2025-02-13 PROCEDURE — 99213 OFFICE O/P EST LOW 20 MIN: CPT

## 2025-02-13 PROCEDURE — 87811 SARS-COV-2 COVID19 W/OPTIC: CPT

## 2025-02-13 PROCEDURE — 87880 STREP A ASSAY W/OPTIC: CPT

## 2025-02-13 RX ORDER — BROMPHENIRAMINE MALEATE, PSEUDOEPHEDRINE HYDROCHLORIDE, AND DEXTROMETHORPHAN HYDROBROMIDE 2; 30; 10 MG/5ML; MG/5ML; MG/5ML
10 SYRUP ORAL 4 TIMES DAILY PRN
Qty: 200 ML | Refills: 0 | Status: SHIPPED | OUTPATIENT
Start: 2025-02-13 | End: 2025-02-18

## 2025-02-13 ASSESSMENT — ENCOUNTER SYMPTOMS
DIARRHEA: 0
WHEEZING: 0
SORE THROAT: 1
COUGH: 0
ABDOMINAL PAIN: 0
ABDOMINAL DISTENTION: 0
SHORTNESS OF BREATH: 0
COLOR CHANGE: 0
EYE ITCHING: 0
CHEST TIGHTNESS: 0
SINUS PRESSURE: 0
CONSTIPATION: 0
EYE DISCHARGE: 0
VOMITING: 0
TROUBLE SWALLOWING: 0
SINUS PAIN: 0
APNEA: 0
RHINORRHEA: 0
NAUSEA: 0
EYE PAIN: 0

## 2025-02-13 NOTE — PATIENT INSTRUCTIONS
- Positive Strep and COVID test.  - Work note provided; return on Monday.  - Return to the clinic or follow up with PCP if symptoms worsen or fail to improve.    Strep Treatment    - Antibiotic sent to the pharmacy, take as directed.  - Tylenol/Motrin as needed for pain or fever.  - Rest and increase fluid intake.  - Throw away toothbrush after 48 hours of antibiotic administration.   - Avoid sharing drinks or food for at least 48 hours.   - Monitor for rash, vomiting with inability to hold down medication or high fever that won't break.  - Warm salt water gargles to relieve throat irritation.  - Refrain from returning to work/school until fever free for 24 hours without administration of any Tylenol or Motrin.  - Follow up with PCP or return to clinic if symptoms worsen or fail to improve.    COVID Treatment    - Per CDC guidelines, quarantine x 5 days from symptoms onset, not considered contagious after fever free for 24 hours without any medication.  - Rest.  - Increase fluid intake.  - Alternate Tylenol/Motrin as needed for fever/body aches.  - May take OTC oral antihistamine and nasal decongestant.  - May use saline nasal washes.  - Place a cool mist humidifier next to bed while sleeping.  - Return to the clinic or follow up with PCP if symptoms worsen or fail to improve.

## 2025-02-13 NOTE — PROGRESS NOTES
SARS-COV-2, POC Detected (A) Not Detected    Lot Number [id number]     QC Pass/Fail PASS    POCT Influenza A/B   Result Value Ref Range    Influenza A Ab NEG     Influenza B Ab NEG        Orders Placed This Encounter   Medications    amoxicillin-clavulanate (AUGMENTIN) 875-125 MG per tablet     Sig: Take 1 tablet by mouth 2 times daily for 10 days     Dispense:  20 tablet     Refill:  0    brompheniramine-pseudoephedrine-DM 2-30-10 MG/5ML syrup     Sig: Take 10 mLs by mouth 4 times daily as needed for Cough     Dispense:  200 mL     Refill:  0        Patient Instructions    - Positive Strep and COVID test.  - Work note provided; return on Monday.  - Return to the clinic or follow up with PCP if symptoms worsen or fail to improve.    Strep Treatment    - Antibiotic sent to the pharmacy, take as directed.  - Tylenol/Motrin as needed for pain or fever.  - Rest and increase fluid intake.  - Throw away toothbrush after 48 hours of antibiotic administration.   - Avoid sharing drinks or food for at least 48 hours.   - Monitor for rash, vomiting with inability to hold down medication or high fever that won't break.  - Warm salt water gargles to relieve throat irritation.  - Refrain from returning to work/school until fever free for 24 hours without administration of any Tylenol or Motrin.  - Follow up with PCP or return to clinic if symptoms worsen or fail to improve.    COVID Treatment    - Per CDC guidelines, quarantine x 5 days from symptoms onset, not considered contagious after fever free for 24 hours without any medication.  - Rest.  - Increase fluid intake.  - Alternate Tylenol/Motrin as needed for fever/body aches.  - May take OTC oral antihistamine and nasal decongestant.  - May use saline nasal washes.  - Place a cool mist humidifier next to bed while sleeping.  - Return to the clinic or follow up with PCP if symptoms worsen or fail to improve.          Electronically signed by NANDO Huizar CNP on 2/13/2025

## 2025-02-17 ENCOUNTER — PATIENT MESSAGE (OUTPATIENT)
Dept: PRIMARY CARE CLINIC | Age: 45
End: 2025-02-17

## 2025-02-17 NOTE — TELEPHONE ENCOUNTER
Palpitations are very likely PACs as over 19,000 of these were noted while he was wearing the monitor.  Given that they are so frequent and he is symptomatic I do think would be worthwhile seeing cardiology

## 2025-02-18 LAB — ECHO BSA: 2.89 M2

## 2025-02-24 DIAGNOSIS — I49.1 PAC (PREMATURE ATRIAL CONTRACTION): Primary | ICD-10-CM

## 2025-02-28 ENCOUNTER — OFFICE VISIT (OUTPATIENT)
Dept: CARDIOLOGY CLINIC | Age: 45
End: 2025-02-28
Payer: COMMERCIAL

## 2025-02-28 VITALS
WEIGHT: 315 LBS | DIASTOLIC BLOOD PRESSURE: 110 MMHG | BODY MASS INDEX: 44.1 KG/M2 | HEIGHT: 71 IN | SYSTOLIC BLOOD PRESSURE: 168 MMHG | HEART RATE: 57 BPM

## 2025-02-28 DIAGNOSIS — I10 ESSENTIAL (PRIMARY) HYPERTENSION: ICD-10-CM

## 2025-02-28 DIAGNOSIS — R00.2 PALPITATIONS: Primary | ICD-10-CM

## 2025-02-28 DIAGNOSIS — R00.2 PALPITATIONS: ICD-10-CM

## 2025-02-28 LAB — TSH SERPL DL<=0.005 MIU/L-ACNC: 1.45 UIU/ML (ref 0.27–4.2)

## 2025-02-28 PROCEDURE — 99203 OFFICE O/P NEW LOW 30 MIN: CPT | Performed by: INTERNAL MEDICINE

## 2025-02-28 PROCEDURE — 3080F DIAST BP >= 90 MM HG: CPT | Performed by: INTERNAL MEDICINE

## 2025-02-28 PROCEDURE — 3077F SYST BP >= 140 MM HG: CPT | Performed by: INTERNAL MEDICINE

## 2025-02-28 PROCEDURE — 93000 ELECTROCARDIOGRAM COMPLETE: CPT | Performed by: INTERNAL MEDICINE

## 2025-02-28 RX ORDER — METOPROLOL TARTRATE 25 MG/1
25 TABLET, FILM COATED ORAL 2 TIMES DAILY
Qty: 180 TABLET | Refills: 1 | Status: SHIPPED | OUTPATIENT
Start: 2025-02-28

## 2025-02-28 ASSESSMENT — ENCOUNTER SYMPTOMS: SHORTNESS OF BREATH: 0

## 2025-02-28 NOTE — PROGRESS NOTES
[facility]  Cardiology Office Note  [address] [address], [place] [address]  Phone: [phone]  Fax: [phone]                            Date:  2/28/2025  Patient: Valentin Stratton  Age:  44 y.o., 1980    Referral: Will Rain MD      PROBLEM LIST:    Palpitation      PRESENTATION: Valentin Stratton is a 44 y.o. year old male patient of  who presents with palpitations that began approximately one month ago, following a gastrointestinal illness. The illness lasted about 6 days and included fever for one day and severe diarrhea. A stool swab was performed but came back clear. Immediately after recovering from this illness, the patient began experiencing palpitations.    The palpitations occur daily, sometimes lasting all day, and can be severe enough to wake the patient from sleep. The patient reports no identifiable triggers or alleviating factors. He has worn a heart monitor to track the palpitations, which showed no correlation with specific activities. The patient has ceased consumption of caffeine, including coffee, sodas, and energy drinks, since the onset of palpitations. He now primarily drinks Sprite and Ginger Ale.    The patient was previously on amlodipine for hypertension but was switched to lisinopril due to concerns that amlodipine might be causing the palpitations. However, the medication change did not affect the palpitation symptoms. The patient denies any changes in diet, sleep patterns, or stress levels. He works nights as a  and has not experienced any work-related problems due to the palpitations, shortness of breath, chest pain, or chest pressure.    The 7-day Holter monitor was reviewed personally by me which demonstrated no A-fib or a flutter.  There are occasional episodes of PVCs with less than 0.1% overall burden.  The tachycardia burden was 7% with a maximum heart rate of 146 bpm.  Overall bradycardia burden was

## 2025-03-03 ENCOUNTER — TELEPHONE (OUTPATIENT)
Dept: CARDIOLOGY CLINIC | Age: 45
End: 2025-03-03

## 2025-03-03 NOTE — TELEPHONE ENCOUNTER
Called patient with and let him know that his TSH was normal per Dr Wolfe.  Patient thanked me and voiced understanding.

## 2025-03-03 NOTE — TELEPHONE ENCOUNTER
----- Message from Dr. Fermin Wolfe MD sent at 2/28/2025  1:21 PM CST -----  Pls let pt know TSH wnl

## 2025-03-04 ENCOUNTER — TELEPHONE (OUTPATIENT)
Dept: CARDIOLOGY CLINIC | Age: 45
End: 2025-03-04

## 2025-03-10 ENCOUNTER — HOSPITAL ENCOUNTER (OUTPATIENT)
Age: 45
Discharge: HOME | End: 2025-03-12
Attending: INTERNAL MEDICINE
Payer: COMMERCIAL

## 2025-03-10 DIAGNOSIS — R00.2 PALPITATIONS: ICD-10-CM

## 2025-03-10 LAB
ECHO AO ASC DIAM: 3.3 CM
ECHO AO ROOT DIAM: 2.1 CM
ECHO AO SINUS VALSALVA DIAM: 3.3 CM
ECHO AO ST JNCT DIAM: 3.4 CM
ECHO AV AREA PEAK VELOCITY: 2.7 CM2
ECHO AV AREA VTI: 2.8 CM2
ECHO AV MEAN GRADIENT: 3 MMHG
ECHO AV MEAN VELOCITY: 0.8 M/S
ECHO AV PEAK GRADIENT: 6 MMHG
ECHO AV PEAK VELOCITY: 1.2 M/S
ECHO AV VELOCITY RATIO: 0.83
ECHO AV VTI: 21.5 CM
ECHO EST RA PRESSURE: 3 MMHG
ECHO IVC PROX: 1.6 CM
ECHO LA AREA 2C: 17.8 CM2
ECHO LA AREA 4C: 12.8 CM2
ECHO LA DIAMETER: 3.6 CM
ECHO LA MAJOR AXIS: 4.7 CM
ECHO LA MINOR AXIS: 4.9 CM
ECHO LA TO AORTIC ROOT RATIO: 1.71
ECHO LA VOL BP: 37 ML (ref 18–58)
ECHO LA VOL MOD A2C: 49 ML (ref 18–58)
ECHO LA VOL MOD A4C: 28 ML (ref 18–58)
ECHO LV E' LATERAL VELOCITY: 14.1 CM/S
ECHO LV E' SEPTAL VELOCITY: 8.92 CM/S
ECHO LV EDV A2C: 220 ML
ECHO LV EDV A4C: 187 ML
ECHO LV EJECTION FRACTION 3D: 60 %
ECHO LV EJECTION FRACTION A2C: 56 %
ECHO LV EJECTION FRACTION A4C: 69 %
ECHO LV EJECTION FRACTION BIPLANE: 60 % (ref 55–100)
ECHO LV ESV A2C: 96 ML
ECHO LV ESV A4C: 59 ML
ECHO LV FRACTIONAL SHORTENING: 33 % (ref 28–44)
ECHO LV INTERNAL DIMENSION DIASTOLIC: 5.5 CM (ref 4.2–5.9)
ECHO LV INTERNAL DIMENSION SYSTOLIC: 3.7 CM
ECHO LV IVSD: 1.6 CM (ref 0.6–1)
ECHO LV MASS 2D: 355.3 G (ref 88–224)
ECHO LV POSTERIOR WALL DIASTOLIC: 1.3 CM (ref 0.6–1)
ECHO LV RELATIVE WALL THICKNESS RATIO: 0.47
ECHO LVOT AREA: 3.1 CM2
ECHO LVOT AV VTI INDEX: 0.9
ECHO LVOT DIAM: 2 CM
ECHO LVOT MEAN GRADIENT: 2 MMHG
ECHO LVOT PEAK GRADIENT: 4 MMHG
ECHO LVOT PEAK VELOCITY: 1 M/S
ECHO LVOT SV: 60.6 ML
ECHO LVOT VTI: 19.3 CM
ECHO MV A VELOCITY: 0.42 M/S
ECHO MV AREA VTI: 2.3 CM2
ECHO MV E DECELERATION TIME (DT): 185 MS
ECHO MV E VELOCITY: 0.83 M/S
ECHO MV E/A RATIO: 1.98
ECHO MV E/E' LATERAL: 5.89
ECHO MV E/E' RATIO (AVERAGED): 7.6
ECHO MV E/E' SEPTAL: 9.3
ECHO MV LVOT VTI INDEX: 1.37
ECHO MV MAX VELOCITY: 0.9 M/S
ECHO MV MEAN GRADIENT: 1 MMHG
ECHO MV MEAN VELOCITY: 0.5 M/S
ECHO MV PEAK GRADIENT: 3 MMHG
ECHO MV VTI: 26.4 CM
ECHO RA AREA 4C: 12.4 CM2
ECHO RA VOLUME: 33 ML
ECHO RV BASAL DIMENSION: 3.5 CM
ECHO RV INTERNAL DIMENSION: 2.6 CM
ECHO RV LONGITUDINAL DIMENSION: 8.3 CM
ECHO RV MID DIMENSION: 3.1 CM
ECHO RV TAPSE: 1.6 CM (ref 1.7–?)

## 2025-03-10 PROCEDURE — 6360000004 HC RX CONTRAST MEDICATION: Performed by: INTERNAL MEDICINE

## 2025-03-10 PROCEDURE — 93306 TTE W/DOPPLER COMPLETE: CPT | Performed by: INTERNAL MEDICINE

## 2025-03-10 PROCEDURE — C8929 TTE W OR WO FOL WCON,DOPPLER: HCPCS

## 2025-03-10 RX ADMIN — SULFUR HEXAFLUORIDE 2 ML: 60.7; .19; .19 INJECTION, POWDER, LYOPHILIZED, FOR SUSPENSION INTRAVENOUS; INTRAVESICAL at 10:04

## 2025-03-11 ENCOUNTER — RESULTS FOLLOW-UP (OUTPATIENT)
Age: 45
End: 2025-03-11

## 2025-03-11 NOTE — TELEPHONE ENCOUNTER
----- Message from Dr. Fermin Wolfe MD sent at 3/11/2025  1:32 PM CDT -----  Echo is grossly normal.

## 2025-03-28 ENCOUNTER — TELEPHONE (OUTPATIENT)
Dept: CARDIOLOGY CLINIC | Age: 45
End: 2025-03-28

## 2025-03-28 NOTE — TELEPHONE ENCOUNTER
Patient called to reschedule appointment, patient is traveling from [place] unsure if he can make it on time. Patient requesting a call back to reschedule.

## 2025-03-31 ENCOUNTER — OFFICE VISIT (OUTPATIENT)
Dept: PRIMARY CARE CLINIC | Age: 45
End: 2025-03-31
Payer: COMMERCIAL

## 2025-03-31 VITALS
OXYGEN SATURATION: 96 % | WEIGHT: 315 LBS | BODY MASS INDEX: 44.1 KG/M2 | HEIGHT: 71 IN | DIASTOLIC BLOOD PRESSURE: 84 MMHG | HEART RATE: 70 BPM | SYSTOLIC BLOOD PRESSURE: 116 MMHG | RESPIRATION RATE: 18 BRPM | TEMPERATURE: 96.7 F

## 2025-03-31 DIAGNOSIS — R05.8 ACE-INHIBITOR COUGH: ICD-10-CM

## 2025-03-31 DIAGNOSIS — M54.2 NECK PAIN: Primary | ICD-10-CM

## 2025-03-31 DIAGNOSIS — T46.4X5A ACE-INHIBITOR COUGH: ICD-10-CM

## 2025-03-31 PROCEDURE — 96372 THER/PROPH/DIAG INJ SC/IM: CPT | Performed by: FAMILY MEDICINE

## 2025-03-31 PROCEDURE — 99214 OFFICE O/P EST MOD 30 MIN: CPT | Performed by: FAMILY MEDICINE

## 2025-03-31 RX ORDER — TIZANIDINE 2 MG/1
2 TABLET ORAL EVERY 8 HOURS PRN
Qty: 30 TABLET | Refills: 1 | Status: SHIPPED | OUTPATIENT
Start: 2025-03-31

## 2025-03-31 RX ORDER — KETOROLAC TROMETHAMINE 30 MG/ML
60 INJECTION, SOLUTION INTRAMUSCULAR; INTRAVENOUS ONCE
Status: COMPLETED | OUTPATIENT
Start: 2025-03-31 | End: 2025-03-31

## 2025-03-31 RX ORDER — LOSARTAN POTASSIUM 50 MG/1
50 TABLET ORAL DAILY
Qty: 90 TABLET | Refills: 1 | Status: SHIPPED | OUTPATIENT
Start: 2025-03-31

## 2025-03-31 RX ADMIN — KETOROLAC TROMETHAMINE 60 MG: 30 INJECTION, SOLUTION INTRAMUSCULAR; INTRAVENOUS at 08:55

## 2025-03-31 ASSESSMENT — ENCOUNTER SYMPTOMS
BLOOD IN STOOL: 0
WHEEZING: 0
COUGH: 1
SHORTNESS OF BREATH: 0
CHEST TIGHTNESS: 0
ABDOMINAL PAIN: 0

## 2025-03-31 NOTE — PROGRESS NOTES
General: No focal deficit present.      Mental Status: He is alert.            Vitals:    03/31/25 0819   BP: 116/84   Pulse: 70   Resp: 18   Temp: (!) 96.7 °F (35.9 °C)   SpO2: 96%        Current Outpatient Medications   Medication Sig Dispense Refill    losartan (COZAAR) 50 MG tablet Take 1 tablet by mouth daily 90 tablet 1    tiZANidine (ZANAFLEX) 2 MG tablet Take 1 tablet by mouth every 8 hours as needed (neck pain) 30 tablet 1    metoprolol tartrate (LOPRESSOR) 25 MG tablet Take 1 tablet by mouth 2 times daily 180 tablet 1     No current facility-administered medications for this visit.      Family History   Problem Relation Age of Onset    Early Death Mother     Heart Disease Mother     Obesity Mother         Most of mothers side of the family are big people    Cancer Father     Colon Cancer Father     Early Death Father     Alcohol Abuse Maternal Grandfather     Cancer Maternal Grandfather     Heart Attack Maternal Grandfather     Heart Disease Maternal Grandfather     Vision Loss Maternal Grandfather     Cancer Maternal Grandmother     Gout Maternal Grandmother     Diabetes Paternal Grandmother     Asthma Sister     Birth Defects Maternal Cousin         Heart birth defect    Substance Abuse Maternal Uncle     Other Paternal Uncle         Kidney problems/ G6PD      Past Medical History:   Diagnosis Date    GERD (gastroesophageal reflux disease) 1997    Gall bladder removal    Hypertension     Liver disease 2021    Stage 3 Liver Fibrosis, edge of stage 4    Obesity 1985    Been obese most my life, couple short times of dropping the weight but always comes back    Sleep apnea 2006    Been treated with cpap/bipap since diagnosis      Past Surgical History:   Procedure Laterality Date    UPPER GASTROINTESTINAL ENDOSCOPY        No Known Allergies     Lab Results   Component Value Date     02/01/2025    K 4.9 02/01/2025     02/01/2025    CO2 22 02/01/2025    BUN 17 02/01/2025    CREATININE 0.7

## 2025-04-02 ENCOUNTER — OFFICE VISIT (OUTPATIENT)
Dept: CARDIOLOGY CLINIC | Age: 45
End: 2025-04-02
Payer: COMMERCIAL

## 2025-04-02 VITALS
SYSTOLIC BLOOD PRESSURE: 142 MMHG | DIASTOLIC BLOOD PRESSURE: 98 MMHG | HEIGHT: 71 IN | BODY MASS INDEX: 44.1 KG/M2 | WEIGHT: 315 LBS | HEART RATE: 61 BPM | OXYGEN SATURATION: 98 %

## 2025-04-02 DIAGNOSIS — I10 ESSENTIAL (PRIMARY) HYPERTENSION: ICD-10-CM

## 2025-04-02 DIAGNOSIS — R00.2 PALPITATIONS: Primary | ICD-10-CM

## 2025-04-02 PROCEDURE — 99213 OFFICE O/P EST LOW 20 MIN: CPT | Performed by: INTERNAL MEDICINE

## 2025-04-02 PROCEDURE — 3077F SYST BP >= 140 MM HG: CPT | Performed by: INTERNAL MEDICINE

## 2025-04-02 PROCEDURE — 3080F DIAST BP >= 90 MM HG: CPT | Performed by: INTERNAL MEDICINE

## 2025-04-02 NOTE — PROGRESS NOTES
[facility]  Cardiology Office Note  [address] [facility], [facility]  [address]  Phone: [phone]  Fax: [phone]                            Date:  2025  Patient: Valentin Stratton  Age:  44 y.o., 1980    Referral: No ref. provider found      PROBLEM LIST:    There are no active problems to display for this patient.        PRESENTATION: Valentin Stratton is a 44 y.o. year old male patient of Will Rain MD who presents for follow up.  He initially saw me for palpitations that began approximately 1 month prior to his GI illness.  He previously had undergone a Holter monitor that was unremarkable.  Since last visit he underwent a TTE which showed normal LV and RV function with mild LVH.  We started him on metoprolol during last visit and since then his palpitations have been significantly improved occurring once or twice a week.  Most recently, he was also switched from lisinopril to losartan by Dr. Rain for cough.    Pertinent testing/imagin-day Holter 2/3/2025: No A-fib or flutter.  Occasional PVCs.  Tachycardia burden 7% with max heart rate of 146.  TTE 3/10/2025: Normal LV and RV function.  Mild LVH.  No pericardial effusion    Review of Systems    Review of Systems   All other systems reviewed and are negative.          Past Medical History:      Diagnosis Date    GERD (gastroesophageal reflux disease)     Gall bladder removal    Hypertension     Liver disease     Stage 3 Liver Fibrosis, edge of stage 4    Obesity     Been obese most my life, couple short times of dropping the weight but always comes back    Sleep apnea 2006    Been treated with cpap/bipap since diagnosis       Past Surgical History:      Procedure Laterality Date    UPPER GASTROINTESTINAL ENDOSCOPY         Medications:  Current Outpatient Medications   Medication Sig Dispense Refill    losartan (COZAAR) 50 MG tablet Take 1 tablet by mouth daily 90 tablet 1    tiZANidine

## 2025-06-11 ENCOUNTER — TELEPHONE (OUTPATIENT)
Dept: GASTROENTEROLOGY | Facility: CLINIC | Age: 45
End: 2025-06-11
Payer: COMMERCIAL

## 2025-06-11 NOTE — TELEPHONE ENCOUNTER
Contacted the patient to schedule f/u for fatty liver per the patient will have to talk with his wife and call the office back to schedule. Ok for the hub to schedule with [facility]o   No

## 2025-08-19 DIAGNOSIS — R00.2 PALPITATIONS: ICD-10-CM

## 2025-08-19 RX ORDER — METOPROLOL TARTRATE 25 MG/1
25 TABLET, FILM COATED ORAL 2 TIMES DAILY
Qty: 180 TABLET | Refills: 1 | Status: SHIPPED | OUTPATIENT
Start: 2025-08-19

## 2025-08-27 ENCOUNTER — OFFICE VISIT (OUTPATIENT)
Dept: PRIMARY CARE CLINIC | Age: 45
End: 2025-08-27
Payer: COMMERCIAL

## 2025-08-27 VITALS
WEIGHT: 315 LBS | HEART RATE: 65 BPM | TEMPERATURE: 97.8 F | BODY MASS INDEX: 44.1 KG/M2 | DIASTOLIC BLOOD PRESSURE: 88 MMHG | SYSTOLIC BLOOD PRESSURE: 144 MMHG | RESPIRATION RATE: 18 BRPM | OXYGEN SATURATION: 98 % | HEIGHT: 71 IN

## 2025-08-27 DIAGNOSIS — R00.2 PALPITATIONS: ICD-10-CM

## 2025-08-27 DIAGNOSIS — I10 PRIMARY HYPERTENSION: Primary | ICD-10-CM

## 2025-08-27 LAB
ALBUMIN SERPL-MCNC: 3.8 G/DL (ref 3.5–5.2)
ALP SERPL-CCNC: 109 U/L (ref 40–129)
ALT SERPL-CCNC: 43 U/L (ref 10–50)
ANION GAP SERPL CALCULATED.3IONS-SCNC: 9 MMOL/L (ref 8–16)
AST SERPL-CCNC: 29 U/L (ref 10–50)
BASOPHILS # BLD: 0.1 K/UL (ref 0–0.2)
BASOPHILS NFR BLD: 0.8 % (ref 0–1)
BILIRUB SERPL-MCNC: 0.3 MG/DL (ref 0.2–1.2)
BUN SERPL-MCNC: 20 MG/DL (ref 6–20)
CALCIUM SERPL-MCNC: 9 MG/DL (ref 8.6–10)
CHLORIDE SERPL-SCNC: 104 MMOL/L (ref 98–107)
CO2 SERPL-SCNC: 24 MMOL/L (ref 22–29)
CREAT SERPL-MCNC: 1 MG/DL (ref 0.7–1.2)
EOSINOPHIL # BLD: 0.4 K/UL (ref 0–0.6)
EOSINOPHIL NFR BLD: 4.8 % (ref 0–5)
ERYTHROCYTE [DISTWIDTH] IN BLOOD BY AUTOMATED COUNT: 12.5 % (ref 11.5–14.5)
GLUCOSE SERPL-MCNC: 100 MG/DL (ref 70–99)
HBA1C MFR BLD: 5.7 % (ref 4–5.6)
HCT VFR BLD AUTO: 46.2 % (ref 42–52)
HGB BLD-MCNC: 15 G/DL (ref 14–18)
IMM GRANULOCYTES # BLD: 0 K/UL
LYMPHOCYTES # BLD: 2.2 K/UL (ref 1.1–4.5)
LYMPHOCYTES NFR BLD: 28.4 % (ref 20–40)
MCH RBC QN AUTO: 28 PG (ref 27–31)
MCHC RBC AUTO-ENTMCNC: 32.5 G/DL (ref 33–37)
MCV RBC AUTO: 86.2 FL (ref 80–94)
MONOCYTES # BLD: 0.7 K/UL (ref 0–0.9)
MONOCYTES NFR BLD: 9.5 % (ref 0–10)
NEUTROPHILS # BLD: 4.4 K/UL (ref 1.5–7.5)
NEUTS SEG NFR BLD: 56.2 % (ref 50–65)
PLATELET # BLD AUTO: 232 K/UL (ref 130–400)
PMV BLD AUTO: 10.8 FL (ref 9.4–12.4)
POTASSIUM SERPL-SCNC: 4.4 MMOL/L (ref 3.5–5.1)
PROT SERPL-MCNC: 7.1 G/DL (ref 6.4–8.3)
RBC # BLD AUTO: 5.36 M/UL (ref 4.7–6.1)
SODIUM SERPL-SCNC: 137 MMOL/L (ref 136–145)
WBC # BLD AUTO: 7.8 K/UL (ref 4.8–10.8)

## 2025-08-27 PROCEDURE — 3077F SYST BP >= 140 MM HG: CPT | Performed by: FAMILY MEDICINE

## 2025-08-27 PROCEDURE — 3079F DIAST BP 80-89 MM HG: CPT | Performed by: FAMILY MEDICINE

## 2025-08-27 PROCEDURE — 99214 OFFICE O/P EST MOD 30 MIN: CPT | Performed by: FAMILY MEDICINE

## 2025-08-27 RX ORDER — LOSARTAN POTASSIUM AND HYDROCHLOROTHIAZIDE 12.5; 5 MG/1; MG/1
1 TABLET ORAL 2 TIMES DAILY
Qty: 180 TABLET | Refills: 1 | Status: SHIPPED | OUTPATIENT
Start: 2025-08-27

## 2025-08-27 ASSESSMENT — ENCOUNTER SYMPTOMS
BLOOD IN STOOL: 0
ABDOMINAL PAIN: 0
CHEST TIGHTNESS: 0
WHEEZING: 0
SHORTNESS OF BREATH: 0

## 2025-09-05 ENCOUNTER — APPOINTMENT (OUTPATIENT)
Dept: GENERAL RADIOLOGY | Age: 45
End: 2025-09-05
Payer: COMMERCIAL

## 2025-09-05 ENCOUNTER — HOSPITAL ENCOUNTER (EMERGENCY)
Age: 45
Discharge: HOME | End: 2025-09-05
Attending: EMERGENCY MEDICINE
Payer: COMMERCIAL

## 2025-09-05 VITALS
HEIGHT: 71 IN | BODY MASS INDEX: 44.1 KG/M2 | SYSTOLIC BLOOD PRESSURE: 129 MMHG | RESPIRATION RATE: 10 BRPM | OXYGEN SATURATION: 97 % | DIASTOLIC BLOOD PRESSURE: 71 MMHG | TEMPERATURE: 98 F | WEIGHT: 315 LBS | HEART RATE: 59 BPM

## 2025-09-05 DIAGNOSIS — R00.2 PALPITATIONS: Primary | ICD-10-CM

## 2025-09-05 LAB
ALBUMIN SERPL-MCNC: 3.9 G/DL (ref 3.5–5.2)
ALP SERPL-CCNC: 112 U/L (ref 40–129)
ALT SERPL-CCNC: 50 U/L (ref 10–50)
ANION GAP SERPL CALCULATED.3IONS-SCNC: 11 MMOL/L (ref 8–16)
AST SERPL-CCNC: 39 U/L (ref 10–50)
BASOPHILS # BLD: 0.1 K/UL (ref 0–0.2)
BASOPHILS NFR BLD: 0.8 % (ref 0–1)
BILIRUB SERPL-MCNC: 0.5 MG/DL (ref 0.2–1.2)
BUN SERPL-MCNC: 21 MG/DL (ref 6–20)
CALCIUM SERPL-MCNC: 8.9 MG/DL (ref 8.6–10)
CHLORIDE SERPL-SCNC: 101 MMOL/L (ref 98–107)
CO2 SERPL-SCNC: 22 MMOL/L (ref 22–29)
CREAT SERPL-MCNC: 1.1 MG/DL (ref 0.7–1.2)
EOSINOPHIL # BLD: 0.2 K/UL (ref 0–0.6)
EOSINOPHIL NFR BLD: 2.4 % (ref 0–5)
ERYTHROCYTE [DISTWIDTH] IN BLOOD BY AUTOMATED COUNT: 12.3 % (ref 11.5–14.5)
GLUCOSE SERPL-MCNC: 112 MG/DL (ref 70–99)
HCT VFR BLD AUTO: 44.4 % (ref 42–52)
HGB BLD-MCNC: 15.4 G/DL (ref 14–18)
IMM GRANULOCYTES # BLD: 0 K/UL
LYMPHOCYTES # BLD: 2.6 K/UL (ref 1.1–4.5)
LYMPHOCYTES NFR BLD: 29.2 % (ref 20–40)
MCH RBC QN AUTO: 28.7 PG (ref 27–31)
MCHC RBC AUTO-ENTMCNC: 34.7 G/DL (ref 33–37)
MCV RBC AUTO: 82.8 FL (ref 80–94)
MONOCYTES # BLD: 0.8 K/UL (ref 0–0.9)
MONOCYTES NFR BLD: 8.9 % (ref 0–10)
NEUTROPHILS # BLD: 5.2 K/UL (ref 1.5–7.5)
NEUTS SEG NFR BLD: 58.4 % (ref 50–65)
PLATELET # BLD AUTO: 251 K/UL (ref 130–400)
PMV BLD AUTO: 10.6 FL (ref 9.4–12.4)
POTASSIUM SERPL-SCNC: 3.9 MMOL/L (ref 3.5–5.1)
PROT SERPL-MCNC: 7.2 G/DL (ref 6.4–8.3)
RBC # BLD AUTO: 5.36 M/UL (ref 4.7–6.1)
SODIUM SERPL-SCNC: 134 MMOL/L (ref 136–145)
TROPONIN, HIGH SENSITIVITY: 7 NG/L (ref 0–22)
WBC # BLD AUTO: 9 K/UL (ref 4.8–10.8)

## 2025-09-05 PROCEDURE — 85025 COMPLETE CBC W/AUTO DIFF WBC: CPT

## 2025-09-05 PROCEDURE — 71045 X-RAY EXAM CHEST 1 VIEW: CPT

## 2025-09-05 PROCEDURE — 80053 COMPREHEN METABOLIC PANEL: CPT

## 2025-09-05 PROCEDURE — 36415 COLL VENOUS BLD VENIPUNCTURE: CPT

## 2025-09-05 PROCEDURE — 84484 ASSAY OF TROPONIN QUANT: CPT

## 2025-09-05 ASSESSMENT — ENCOUNTER SYMPTOMS
ABDOMINAL PAIN: 0
COUGH: 0
NAUSEA: 1
VOMITING: 0
DIARRHEA: 0
ABDOMINAL DISTENTION: 0
SHORTNESS OF BREATH: 0

## 2025-09-05 ASSESSMENT — PAIN - FUNCTIONAL ASSESSMENT: PAIN_FUNCTIONAL_ASSESSMENT: 0-10

## 2025-09-05 ASSESSMENT — PAIN SCALES - GENERAL: PAINLEVEL_OUTOF10: 0

## (undated) DEVICE — SENSR O2 OXIMAX FNGR A/ 18IN NONSTR

## (undated) DEVICE — YANKAUER,BULB TIP WITH VENT: Brand: ARGYLE

## (undated) DEVICE — CUFF,BP,DISP,1 TUBE,ADULT,HP: Brand: MEDLINE

## (undated) DEVICE — THE CHANNEL CLEANING BRUSH IS A NYLON FLEXI BRUSH ATTACHED TO A FLEXIBLE PLASTIC SHEATH DESIGNED TO SAFELY REMOVE DEBRIS FROM FLEXIBLE ENDOSCOPES.

## (undated) DEVICE — THE SINGLE USE ETRAP – POLYP TRAP IS USED FOR SUCTION RETRIEVAL OF ENDOSCOPICALLY REMOVED POLYPS.: Brand: ETRAP

## (undated) DEVICE — MASK,OXYGEN,MED CONC,ADLT,7' TUB, UC: Brand: PENDING

## (undated) DEVICE — Device: Brand: DEFENDO AIR/WATER/SUCTION AND BIOPSY VALVE

## (undated) DEVICE — FRCP BX RADJAW4 NDL 2.8 240 STD OG

## (undated) DEVICE — SNAR POLYP SENSATION MICRO OVL 13 240X40

## (undated) DEVICE — TBG SMPL FLTR LINE NASL 02/C02 A/ BX/100

## (undated) DEVICE — CONMED SCOPE SAVER BITE BLOCK, 20X27 MM: Brand: SCOPE SAVER